# Patient Record
Sex: MALE | Race: BLACK OR AFRICAN AMERICAN | NOT HISPANIC OR LATINO | URBAN - METROPOLITAN AREA
[De-identification: names, ages, dates, MRNs, and addresses within clinical notes are randomized per-mention and may not be internally consistent; named-entity substitution may affect disease eponyms.]

---

## 2017-03-08 ENCOUNTER — OUTPATIENT (OUTPATIENT)
Dept: OUTPATIENT SERVICES | Facility: HOSPITAL | Age: 67
LOS: 1 days | End: 2017-03-08
Payer: MEDICARE

## 2017-03-08 ENCOUNTER — APPOINTMENT (OUTPATIENT)
Dept: MRI IMAGING | Facility: CLINIC | Age: 67
End: 2017-03-08

## 2017-03-08 DIAGNOSIS — Z00.8 ENCOUNTER FOR OTHER GENERAL EXAMINATION: ICD-10-CM

## 2017-03-08 PROCEDURE — 72197 MRI PELVIS W/O & W/DYE: CPT

## 2017-03-08 PROCEDURE — A9585: CPT

## 2017-03-08 PROCEDURE — 82565 ASSAY OF CREATININE: CPT

## 2018-06-01 ENCOUNTER — OUTPATIENT (OUTPATIENT)
Dept: OUTPATIENT SERVICES | Facility: HOSPITAL | Age: 68
LOS: 1 days | End: 2018-06-01

## 2018-06-10 ENCOUNTER — EMERGENCY (EMERGENCY)
Facility: HOSPITAL | Age: 68
LOS: 0 days | Discharge: HOME | End: 2018-06-10
Attending: EMERGENCY MEDICINE
Payer: MEDICARE

## 2018-06-10 VITALS
OXYGEN SATURATION: 97 % | TEMPERATURE: 98 F | HEIGHT: 66 IN | SYSTOLIC BLOOD PRESSURE: 120 MMHG | DIASTOLIC BLOOD PRESSURE: 66 MMHG | RESPIRATION RATE: 15 BRPM | WEIGHT: 179.9 LBS | HEART RATE: 83 BPM

## 2018-06-10 DIAGNOSIS — I10 ESSENTIAL (PRIMARY) HYPERTENSION: ICD-10-CM
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
## 2018-06-10 DIAGNOSIS — E78.00 PURE HYPERCHOLESTEROLEMIA, UNSPECIFIED: ICD-10-CM

## 2018-06-10 DIAGNOSIS — Z79.899 OTHER LONG TERM (CURRENT) DRUG THERAPY: ICD-10-CM

## 2018-06-10 DIAGNOSIS — M79.89 OTHER SPECIFIED SOFT TISSUE DISORDERS: ICD-10-CM

## 2018-06-10 DIAGNOSIS — S83.91XA SPRAIN OF UNSPECIFIED SITE OF RIGHT KNEE, INITIAL ENCOUNTER: ICD-10-CM

## 2018-06-10 DIAGNOSIS — M25.561 PAIN IN RIGHT KNEE: ICD-10-CM
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
## 2018-06-10 PROCEDURE — 99284 EMERGENCY DEPT VISIT MOD MDM: CPT

## 2018-06-10 PROCEDURE — 93971 EXTREMITY STUDY: CPT | Mod: 26,RT

## 2018-06-10 PROCEDURE — 73562 X-RAY EXAM OF KNEE 3: CPT | Mod: 26,RT

## 2018-06-10 RX ORDER — IBUPROFEN 200 MG
1 TABLET ORAL
Qty: 15 | Refills: 0
Start: 2018-06-10 | End: 2018-06-14

## 2018-06-10 RX ORDER — IBUPROFEN 200 MG
600 TABLET ORAL ONCE
Qty: 0 | Refills: 0 | Status: COMPLETED | OUTPATIENT
Start: 2018-06-10 | End: 2018-06-10

## 2018-06-10 RX ADMIN — Medication 600 MILLIGRAM(S): at 09:15

## 2018-06-10 RX ADMIN — Medication 600 MILLIGRAM(S): at 08:27

## 2018-06-10 NOTE — ED ADULT NURSE NOTE - OBJECTIVE STATEMENT
Reports having right knee pain starting 1 week ago, reports having swelling above knee, worsens with walking and weight bearing, lessens with rest. Deneis popping, or trauma.

## 2018-06-10 NOTE — ED PROVIDER NOTE - PHYSICAL EXAMINATION
Gen: Alert, Well appearing. NAD    Head: NC, AT, PERRL, EOMI, normal lids/conjunctiva   ENT: Bilateral TM WNL, normal hearing, patent oropharynx without erythema/exudate, uvula midline  Neck: supple, no tenderness/meningismus/JVD   Pulm: Bilateral clear BS, normal resp effort, no wheeze/stridor/retractions  CV: RRR, no M/R/G, +dist pulses   Abd: soft, NT/ND, +BS, no guarding/rebound tenderness  Mskel: + mild edema to knee, most medially, tender medial joint line. from of knee. able to fully flex and exten but pain on extension with pain to valgus stress. pulses intact. no calf tenderness  Skin: no rash   Neuro: AAOx3, no sensory/motor deficits, CN 2-12 intact
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
## 2018-06-10 NOTE — ED PROVIDER NOTE - OBJECTIVE STATEMENT
66yo male with pmh HTN, HL, presents with rt knee pain and swelling x 1 week. Long bus ride. denies any trauma, has been ambulating on it at work. no fever    ROS: No fever/chills. No photophobia/eye pain/changes in vision, No ear pain/sore throat/dysphagia, No chest pain/palpitations. No SOB/cough/stridor. No abdominal pain, N/V/D, no black/bloody bm. No dysuria/frequency/discharge, No headache. No Dizziness.  No rash.  No numbness/tingling/weakness.

## 2018-06-10 NOTE — ED PROVIDER NOTE - MEDICAL DECISION MAKING DETAILS
pt well appearing, xray neg for fx, sono neg for dvt likely sprain. fu with ortho. Discussed results and outcome of testing with the patient.  Patient given copy of available results. Patient advised to please follow up with their PMD within the next 24 hours and return to the Emergency Department for worsening symptoms or any other concerns.

## 2018-06-12 DIAGNOSIS — R69 ILLNESS, UNSPECIFIED: ICD-10-CM

## 2018-07-01 ENCOUNTER — OUTPATIENT (OUTPATIENT)
Dept: OUTPATIENT SERVICES | Facility: HOSPITAL | Age: 68
LOS: 1 days | End: 2018-07-01

## 2018-07-16 DIAGNOSIS — Z71.89 OTHER SPECIFIED COUNSELING: ICD-10-CM

## 2018-10-09 ENCOUNTER — EMERGENCY (EMERGENCY)
Facility: HOSPITAL | Age: 68
LOS: 0 days | Discharge: HOME | End: 2018-10-09
Attending: EMERGENCY MEDICINE

## 2018-10-09 VITALS
DIASTOLIC BLOOD PRESSURE: 79 MMHG | WEIGHT: 179.9 LBS | HEIGHT: 66 IN | TEMPERATURE: 98 F | OXYGEN SATURATION: 96 % | HEART RATE: 90 BPM | RESPIRATION RATE: 18 BRPM | SYSTOLIC BLOOD PRESSURE: 153 MMHG

## 2018-10-09 DIAGNOSIS — M25.551 PAIN IN RIGHT HIP: ICD-10-CM

## 2018-10-09 DIAGNOSIS — M54.31 SCIATICA, RIGHT SIDE: ICD-10-CM

## 2018-10-09 RX ORDER — ACETAMINOPHEN 500 MG
2 TABLET ORAL
Qty: 20 | Refills: 0
Start: 2018-10-09

## 2018-10-09 RX ORDER — KETOROLAC TROMETHAMINE 30 MG/ML
15 SYRINGE (ML) INJECTION ONCE
Qty: 0 | Refills: 0 | Status: DISCONTINUED | OUTPATIENT
Start: 2018-10-09 | End: 2018-10-09

## 2018-10-09 RX ORDER — CYCLOBENZAPRINE HYDROCHLORIDE 10 MG/1
1 TABLET, FILM COATED ORAL
Qty: 10 | Refills: 0
Start: 2018-10-09

## 2018-10-09 RX ADMIN — Medication 15 MILLIGRAM(S): at 12:15

## 2018-10-09 NOTE — ED ADULT TRIAGE NOTE - NS ED TRIAGE AVPU SCALE
Problem: Goal Outcome Summary  Goal: Goal Outcome Summary  Patient SBA or less with all mobility - able to ambulate 225ft in hallway and negotiate 6 steps with B rails for entry into home. Pt demonstrates and verbalizes understanding of abdominal precautions with mobility. PT goals met and no further skilled PT indicated at this time; recommend continued ambulation 3x/day with RN staff until discharge. Recommend return home with assist from spouse at d/c.      Physical Therapy Discharge Summary     Reason for therapy discharge:    All goals and outcomes met, no further needs identified.     Progress towards therapy goal(s). See goals on Care Plan in [facility] electronic health record for goal details.  Goals met     Therapy recommendation(s):    No further therapy is recommended.              Alert-The patient is alert, awake and responds to voice. The patient is oriented to time, place, and person. The triage nurse is able to obtain subjective information.

## 2018-10-09 NOTE — ED PROVIDER NOTE - OBJECTIVE STATEMENT
69 y/o male with PMH HTN, HLD, BPH, CAD s/p 2 stents here c/o R side lower back pain radiating to R buttocks and post R thigh x 1 week. pt states he has hx sciatica and pain feels same as prior episodes. he states he went to his pcp twice this week who gave him naprosyn 500mg and flexril 5mg, but pt states he is still in pain so came here for a shot which helped him last time. pt states pain is worse with movement. denies any trauma or injury. pt otherwise denies weakness, incontinence, change in urination, cp, sob, n/v, HA    ROS: No fever/chills. No eye pain/changes in vision, No ear pain/sore throat/dysphagia, No chest pain/palpitations. No SOB/cough/. No abdominal pain, N/V/D, no black/bloody bm. No dysuria/frequency/discharge, No headache. No Dizziness.    No rashes or breaks in skin. No numbness/weakness.

## 2018-10-09 NOTE — ED ADULT TRIAGE NOTE - CHIEF COMPLAINT QUOTE
c/o r lower back pain radiating down r leg x 1 week seen at pmd x 2 for same c/o dx'd with sciatica rx'd muscle relaxers and pain meds ( flexeril and naprosyn) last taken yesterday states not improving ambulatory without difficulty noted

## 2018-10-09 NOTE — ED PROVIDER NOTE - PHYSICAL EXAMINATION
Gen: Alert, NAD, well appearing  Head: NC, AT, PERRL, EOMI, normal lids/conjunctiva  ENT: B TM WNL, normal hearing, patent oropharynx without erythema/exudate, uvula midline  Neck: +supple, no tenderness/meningismus/JVD, +Trachea midline  Pulm: Bilateral BS, normal resp effort, no wheeze/stridor/retractions  CV: RRR, no M/R/G, +dist pulses  Abd: soft, NT/ND, +BS, no hepatosplenomegaly  Mskel: no edema/erythema/cyanosis, str 5/5 x4 b/l, sensations intact, no spinal tenderness CTLS, +R lumbar paraspinal tenderness, gait ok  Skin: no rash  Neuro: AAOx3, no sensory/motor deficits, CN 2-12 intact, ftn, hts intact

## 2018-10-09 NOTE — ED ADULT NURSE NOTE - NSIMPLEMENTINTERV_GEN_ALL_ED
Implemented All Universal Safety Interventions:  [place] to call system. Call bell, personal items and telephone within reach. Instruct patient to call for assistance. Room bathroom lighting operational. Non-slip footwear when patient is off stretcher. Physically safe environment: no spills, clutter or unnecessary equipment. Stretcher in lowest position, wheels locked, appropriate side rails in place.

## 2018-10-09 NOTE — ED ADULT NURSE NOTE - OBJECTIVE STATEMENT
pt received alert and oriented x4, pt c/o right buttock pain radiating down the leg for 1 week . pt has hx sciatica

## 2018-10-09 NOTE — ED PROVIDER NOTE - MEDICAL DECISION MAKING DETAILS
pt here with low back pain radiating to R buttocks, consistent with sciatica, no red flags back pain, nuero exam unremarkable, pt given low dose toradol IM, with improvement, d/w pt risk of NSAid with plavix, will send rx tylenol and increase flexril to 10 mg , provided ortho f/u as well, educated re f/u needs ok with dc pt here with low back pain radiating to R buttocks, consistent with sciatica, no red flags back pain, nuero exam unremarkable, pt is ambulatory in ed without complication, no weakness or focal deficits pt given low dose toradol IM, with improvement, d/w pt risk of NSAid with plavix, will send rx tylenol and increase flexril to 10 mg , provided ortho f/u as well, educated re f/u needs ok with dc

## 2018-10-11 ENCOUNTER — OUTPATIENT (OUTPATIENT)
Dept: OUTPATIENT SERVICES | Facility: HOSPITAL | Age: 68
LOS: 1 days | Discharge: HOME | End: 2018-10-11
Payer: MEDICARE

## 2018-10-11 ENCOUNTER — APPOINTMENT (OUTPATIENT)
Dept: CT IMAGING | Facility: HOSPITAL | Age: 68
End: 2018-10-11

## 2018-10-11 DIAGNOSIS — M54.16 RADICULOPATHY, LUMBAR REGION: ICD-10-CM

## 2018-10-11 PROCEDURE — 72131 CT LUMBAR SPINE W/O DYE: CPT | Mod: 26

## 2019-01-18 ENCOUNTER — EMERGENCY (EMERGENCY)
Facility: HOSPITAL | Age: 69
LOS: 0 days | Discharge: HOME | End: 2019-01-18
Attending: EMERGENCY MEDICINE
Payer: MEDICARE

## 2019-01-18 VITALS
WEIGHT: 179.9 LBS | RESPIRATION RATE: 17 BRPM | TEMPERATURE: 98 F | SYSTOLIC BLOOD PRESSURE: 128 MMHG | DIASTOLIC BLOOD PRESSURE: 87 MMHG | HEART RATE: 82 BPM | OXYGEN SATURATION: 97 %

## 2019-01-18 PROCEDURE — 99284 EMERGENCY DEPT VISIT MOD MDM: CPT

## 2019-01-18 RX ORDER — KETOROLAC TROMETHAMINE 30 MG/ML
30 SYRINGE (ML) INJECTION ONCE
Qty: 0 | Refills: 0 | Status: DISCONTINUED | OUTPATIENT
Start: 2019-01-18 | End: 2019-01-18

## 2019-01-18 RX ORDER — ACETAMINOPHEN WITH CODEINE 300MG-30MG
1 TABLET ORAL
Qty: 6 | Refills: 0
Start: 2019-01-18

## 2019-01-18 RX ORDER — TRAMADOL HYDROCHLORIDE 50 MG/1
1 TABLET ORAL
Qty: 6 | Refills: 0 | OUTPATIENT
Start: 2019-01-18

## 2019-01-18 RX ADMIN — Medication 30 MILLIGRAM(S): at 14:09

## 2019-01-18 RX ADMIN — Medication 30 MILLIGRAM(S): at 14:25

## 2019-01-18 NOTE — ED ADULT NURSE NOTE - NSIMPLEMENTINTERV_GEN_ALL_ED
Implemented All Universal Safety Interventions:  [place] to call system. Call bell, personal items and telephone within reach. Instruct patient to call for assistance. Room bathroom lighting operational. Non-slip footwear when patient is off stretcher. Physically safe environment: no spills, clutter or unnecessary equipment. Stretcher in lowest position, wheels locked, appropriate side rails in place.

## 2019-01-18 NOTE — ED PROVIDER NOTE - CARE PROVIDER_API CALL
Alannah Magana (DO), Orthopaedic Surgery Orthopaedics Surgery  [address] [place] [address]  Phone: [phone]  Fax: [phone]

## 2019-01-19 DIAGNOSIS — I10 ESSENTIAL (PRIMARY) HYPERTENSION: ICD-10-CM

## 2019-01-19 DIAGNOSIS — M54.9 DORSALGIA, UNSPECIFIED: ICD-10-CM

## 2019-01-19 DIAGNOSIS — M54.31 SCIATICA, RIGHT SIDE: ICD-10-CM

## 2019-01-19 DIAGNOSIS — E78.00 PURE HYPERCHOLESTEROLEMIA, UNSPECIFIED: ICD-10-CM

## 2019-12-15 ENCOUNTER — EMERGENCY (EMERGENCY)
Facility: HOSPITAL | Age: 69
LOS: 0 days | Discharge: HOME | End: 2019-12-15
Attending: EMERGENCY MEDICINE
Payer: MEDICARE

## 2019-12-15 VITALS
HEART RATE: 64 BPM | OXYGEN SATURATION: 97 % | SYSTOLIC BLOOD PRESSURE: 151 MMHG | DIASTOLIC BLOOD PRESSURE: 80 MMHG | RESPIRATION RATE: 20 BRPM

## 2019-12-15 VITALS
TEMPERATURE: 98 F | HEIGHT: 66 IN | RESPIRATION RATE: 16 BRPM | HEART RATE: 72 BPM | DIASTOLIC BLOOD PRESSURE: 77 MMHG | WEIGHT: 179.9 LBS | OXYGEN SATURATION: 97 % | SYSTOLIC BLOOD PRESSURE: 127 MMHG

## 2019-12-15 DIAGNOSIS — Z79.01 LONG TERM (CURRENT) USE OF ANTICOAGULANTS: ICD-10-CM

## 2019-12-15 DIAGNOSIS — R42 DIZZINESS AND GIDDINESS: ICD-10-CM

## 2019-12-15 DIAGNOSIS — Z95.5 PRESENCE OF CORONARY ANGIOPLASTY IMPLANT AND GRAFT: ICD-10-CM

## 2019-12-15 DIAGNOSIS — E78.00 PURE HYPERCHOLESTEROLEMIA, UNSPECIFIED: ICD-10-CM

## 2019-12-15 DIAGNOSIS — R07.9 CHEST PAIN, UNSPECIFIED: ICD-10-CM

## 2019-12-15 DIAGNOSIS — R06.02 SHORTNESS OF BREATH: ICD-10-CM

## 2019-12-15 DIAGNOSIS — I25.10 ATHEROSCLEROTIC HEART DISEASE OF NATIVE CORONARY ARTERY WITHOUT ANGINA PECTORIS: ICD-10-CM

## 2019-12-15 DIAGNOSIS — Z79.82 LONG TERM (CURRENT) USE OF ASPIRIN: ICD-10-CM

## 2019-12-15 DIAGNOSIS — I10 ESSENTIAL (PRIMARY) HYPERTENSION: ICD-10-CM

## 2019-12-15 LAB
ALBUMIN SERPL ELPH-MCNC: 3.8 G/DL — SIGNIFICANT CHANGE UP (ref 3.3–5)
ALP SERPL-CCNC: 79 U/L — SIGNIFICANT CHANGE UP (ref 40–120)
ALT FLD-CCNC: 29 U/L — SIGNIFICANT CHANGE UP (ref 12–78)
ANION GAP SERPL CALC-SCNC: 9 MMOL/L — SIGNIFICANT CHANGE UP (ref 5–17)
APTT BLD: 35.6 SEC — SIGNIFICANT CHANGE UP (ref 27.5–36.3)
AST SERPL-CCNC: 26 U/L — SIGNIFICANT CHANGE UP (ref 15–37)
BILIRUB SERPL-MCNC: 0.5 MG/DL — SIGNIFICANT CHANGE UP (ref 0.2–1.2)
BUN SERPL-MCNC: 20 MG/DL — SIGNIFICANT CHANGE UP (ref 7–23)
CALCIUM SERPL-MCNC: 9 MG/DL — SIGNIFICANT CHANGE UP (ref 8.5–10.1)
CHLORIDE SERPL-SCNC: 106 MMOL/L — SIGNIFICANT CHANGE UP (ref 96–108)
CO2 SERPL-SCNC: 27 MMOL/L — SIGNIFICANT CHANGE UP (ref 22–31)
CREAT SERPL-MCNC: 1.07 MG/DL — SIGNIFICANT CHANGE UP (ref 0.5–1.3)
D DIMER BLD IA.RAPID-MCNC: 168 NG/ML DDU — SIGNIFICANT CHANGE UP
GLUCOSE SERPL-MCNC: 102 MG/DL — HIGH (ref 70–99)
HCT VFR BLD CALC: 37.9 % — LOW (ref 39–50)
HGB BLD-MCNC: 12.7 G/DL — LOW (ref 13–17)
INR BLD: 1.02 RATIO — SIGNIFICANT CHANGE UP (ref 0.88–1.16)
MAGNESIUM SERPL-MCNC: 2.1 MG/DL — SIGNIFICANT CHANGE UP (ref 1.6–2.6)
MCHC RBC-ENTMCNC: 27.9 PG — SIGNIFICANT CHANGE UP (ref 27–34)
MCHC RBC-ENTMCNC: 33.5 GM/DL — SIGNIFICANT CHANGE UP (ref 32–36)
MCV RBC AUTO: 83.3 FL — SIGNIFICANT CHANGE UP (ref 80–100)
NRBC # BLD: 0 /100 WBCS — SIGNIFICANT CHANGE UP (ref 0–0)
NT-PROBNP SERPL-SCNC: 68 PG/ML — SIGNIFICANT CHANGE UP (ref 0–125)
PLATELET # BLD AUTO: 203 K/UL — SIGNIFICANT CHANGE UP (ref 150–400)
POTASSIUM SERPL-MCNC: 3.3 MMOL/L — LOW (ref 3.5–5.3)
POTASSIUM SERPL-SCNC: 3.3 MMOL/L — LOW (ref 3.5–5.3)
PROT SERPL-MCNC: 7.5 GM/DL — SIGNIFICANT CHANGE UP (ref 6–8.3)
PROTHROM AB SERPL-ACNC: 11.4 SEC — SIGNIFICANT CHANGE UP (ref 10–12.9)
RBC # BLD: 4.55 M/UL — SIGNIFICANT CHANGE UP (ref 4.2–5.8)
RBC # FLD: 14.6 % — HIGH (ref 10.3–14.5)
SODIUM SERPL-SCNC: 142 MMOL/L — SIGNIFICANT CHANGE UP (ref 135–145)
TROPONIN I SERPL-MCNC: <.015 NG/ML — SIGNIFICANT CHANGE UP (ref 0.01–0.04)
TROPONIN I SERPL-MCNC: <.015 NG/ML — SIGNIFICANT CHANGE UP (ref 0.01–0.04)
WBC # BLD: 5.71 K/UL — SIGNIFICANT CHANGE UP (ref 3.8–10.5)
WBC # FLD AUTO: 5.71 K/UL — SIGNIFICANT CHANGE UP (ref 3.8–10.5)

## 2019-12-15 PROCEDURE — 99285 EMERGENCY DEPT VISIT HI MDM: CPT

## 2019-12-15 PROCEDURE — 93010 ELECTROCARDIOGRAM REPORT: CPT

## 2019-12-15 PROCEDURE — 71045 X-RAY EXAM CHEST 1 VIEW: CPT | Mod: 26

## 2019-12-15 PROCEDURE — 70450 CT HEAD/BRAIN W/O DYE: CPT | Mod: 26

## 2019-12-15 PROCEDURE — 71275 CT ANGIOGRAPHY CHEST: CPT | Mod: 26

## 2019-12-15 RX ORDER — POTASSIUM CHLORIDE 20 MEQ
40 PACKET (EA) ORAL ONCE
Refills: 0 | Status: COMPLETED | OUTPATIENT
Start: 2019-12-15 | End: 2019-12-15

## 2019-12-15 RX ORDER — MECLIZINE HCL 12.5 MG
25 TABLET ORAL ONCE
Refills: 0 | Status: COMPLETED | OUTPATIENT
Start: 2019-12-15 | End: 2019-12-15

## 2019-12-15 RX ADMIN — Medication 25 MILLIGRAM(S): at 14:59

## 2019-12-15 RX ADMIN — Medication 40 MILLIEQUIVALENT(S): at 18:31

## 2019-12-15 NOTE — ED PROVIDER NOTE - PATIENT PORTAL LINK FT
You can access the FollowMyHealth Patient Portal offered by [facility] by registering at the following website: [email or web address]. By joining [employer]’s FollowMyHealth portal, you will also be able to view your health information using other applications (apps) compatible with our system.

## 2019-12-15 NOTE — ED PROVIDER NOTE - CLINICAL SUMMARY MEDICAL DECISION MAKING FREE TEXT BOX
symptoms improved, pt feeling better, asymptomatic. pt has recent stable angiogram 1 week ago. Lab values do not require emergent intervention. CT scan demonstrates no acute pathology. Fu with cardiology.

## 2019-12-15 NOTE — ED ADULT NURSE NOTE - NSIMPLEMENTINTERV_GEN_ALL_ED
Implemented All Fall with Harm Risk Interventions:  [place] to call system. Call bell, personal items and telephone within reach. Instruct patient to call for assistance. Room bathroom lighting operational. Non-slip footwear when patient is off stretcher. Physically safe environment: no spills, clutter or unnecessary equipment. Stretcher in lowest position, wheels locked, appropriate side rails in place. Provide visual cue, wrist band, yellow gown, etc. Monitor gait and stability. Monitor for mental status changes and reorient to person, place, and time. Review medications for side effects contributing to fall risk. Reinforce activity limits and safety measures with patient and family. Provide visual clues: red socks.

## 2019-12-15 NOTE — ED PROVIDER NOTE - OBJECTIVE STATEMENT
68yo male with pmh HTN, HL, CAD x 2 stents (2015, 2018), presents with cp, lightheadedness, and sob.  Pt reports 2 weeks of intermittent lightheadedness, soboe, and chest discomfort. Pt seen at [facility] last week, had angiogram that was reportedly stable and pt was dc. Pt has been feeling those symptoms since along with headache. However, since yesterday pt with chest pain, nonradiating and currently with lightheadedness. denies nv, palpitations, radiation of pain.    ROS: No fever/chills. No photophobia/eye pain/changes in vision, No ear pain/sore throat/dysphagia, +chest pain/ no palpitations.  +SOB/cough. No abdominal pain, No N/V/D, no black/bloody bm. No dysuria/frequency/discharge, +headache/Dizziness.  No rash.  No numbness/tingling/weakness.

## 2019-12-15 NOTE — ED ADULT NURSE NOTE - OBJECTIVE STATEMENT
received er bed 10 c/o chest pain since yesterday intermittently with dypsnea upon exertion over past 2 weeks seen at cardiologist 2 weeks ago [facility] with [facility] done no abnormal findings per pt lungs clear b/l respirations even and unlabored hx cad with 2 stents 2015 and 2018 c/o intermittent lightheaded sensation over past 2 weeks denies n/v c/o intermittent headaches associated with lightheaded sensation over past 2 weeks

## 2020-10-31 ENCOUNTER — EMERGENCY (EMERGENCY)
Facility: HOSPITAL | Age: 70
LOS: 0 days | Discharge: HOME | End: 2020-10-31
Payer: MEDICARE

## 2020-10-31 VITALS
HEART RATE: 91 BPM | TEMPERATURE: 98 F | RESPIRATION RATE: 17 BRPM | SYSTOLIC BLOOD PRESSURE: 151 MMHG | OXYGEN SATURATION: 97 % | WEIGHT: 179.9 LBS | HEIGHT: 66 IN | DIASTOLIC BLOOD PRESSURE: 77 MMHG

## 2020-10-31 DIAGNOSIS — Z20.818 CONTACT WITH AND (SUSPECTED) EXPOSURE TO OTHER BACTERIAL COMMUNICABLE DISEASES: ICD-10-CM

## 2020-10-31 PROBLEM — I25.10 ATHEROSCLEROTIC HEART DISEASE OF NATIVE CORONARY ARTERY WITHOUT ANGINA PECTORIS: Chronic | Status: ACTIVE | Noted: 2019-12-15

## 2020-10-31 PROBLEM — Z95.5 PRESENCE OF CORONARY ANGIOPLASTY IMPLANT AND GRAFT: Chronic | Status: ACTIVE | Noted: 2019-12-15

## 2020-10-31 PROCEDURE — 99283 EMERGENCY DEPT VISIT LOW MDM: CPT

## 2020-10-31 NOTE — ED PROVIDER NOTE - PATIENT PORTAL LINK FT
You can access the FollowMyHealth Patient Portal offered by [facility] by registering at the following website: [email or web address]. By joining [employer]’s FollowMyHealth portal, you will also be able to view your health information using other applications (apps) compatible with our system.

## 2020-10-31 NOTE — ED PROVIDER NOTE - CLINICAL SUMMARY MEDICAL DECISION MAKING FREE TEXT BOX
69 y/o M with possible contact exposure presents to ED requesting covid 19 pcr test, asymptomatic, swab sent to lab pt was dc home on quarantine we will call once result is available.

## 2020-10-31 NOTE — ED PROVIDER NOTE - CARE PLAN
Principal Discharge DX:	Contact with and (suspected) exposure to other bacterial communicable diseases

## 2020-11-01 LAB — SARS-COV-2 RNA SPEC QL NAA+PROBE: SIGNIFICANT CHANGE UP

## 2021-02-03 ENCOUNTER — EMERGENCY (EMERGENCY)
Facility: HOSPITAL | Age: 71
LOS: 0 days | Discharge: HOME | End: 2021-02-04
Attending: STUDENT IN AN ORGANIZED HEALTH CARE EDUCATION/TRAINING PROGRAM
Payer: MEDICARE

## 2021-02-03 VITALS
RESPIRATION RATE: 19 BRPM | DIASTOLIC BLOOD PRESSURE: 94 MMHG | HEIGHT: 66 IN | HEART RATE: 101 BPM | TEMPERATURE: 99 F | WEIGHT: 195.11 LBS | OXYGEN SATURATION: 95 % | SYSTOLIC BLOOD PRESSURE: 153 MMHG

## 2021-02-03 PROCEDURE — 99284 EMERGENCY DEPT VISIT MOD MDM: CPT

## 2021-02-03 NOTE — ED ADULT TRIAGE NOTE - CHIEF COMPLAINT QUOTE
Pt c/o blood in urine with clots , urinary retention x 3 days ago. H/O HTN, HLD, BPH Pt c/o blood in urine with clots , urinary retention, painful urination x 3 days ago. H/O HTN, HLD, BPH

## 2021-02-04 DIAGNOSIS — R31.9 HEMATURIA, UNSPECIFIED: ICD-10-CM

## 2021-02-04 DIAGNOSIS — E78.00 PURE HYPERCHOLESTEROLEMIA, UNSPECIFIED: ICD-10-CM

## 2021-02-04 DIAGNOSIS — I25.10 ATHEROSCLEROTIC HEART DISEASE OF NATIVE CORONARY ARTERY WITHOUT ANGINA PECTORIS: ICD-10-CM

## 2021-02-04 DIAGNOSIS — I10 ESSENTIAL (PRIMARY) HYPERTENSION: ICD-10-CM

## 2021-02-04 LAB
ALBUMIN SERPL ELPH-MCNC: 3.8 G/DL — SIGNIFICANT CHANGE UP (ref 3.3–5)
ALP SERPL-CCNC: 90 U/L — SIGNIFICANT CHANGE UP (ref 40–120)
ALT FLD-CCNC: 39 U/L — SIGNIFICANT CHANGE UP (ref 12–78)
ANION GAP SERPL CALC-SCNC: 5 MMOL/L — SIGNIFICANT CHANGE UP (ref 5–17)
APPEARANCE UR: ABNORMAL
APTT BLD: 37.6 SEC — HIGH (ref 27.5–35.5)
AST SERPL-CCNC: 31 U/L — SIGNIFICANT CHANGE UP (ref 15–37)
BACTERIA # UR AUTO: ABNORMAL
BASOPHILS # BLD AUTO: 0.02 K/UL — SIGNIFICANT CHANGE UP (ref 0–0.2)
BASOPHILS NFR BLD AUTO: 0.3 % — SIGNIFICANT CHANGE UP (ref 0–2)
BILIRUB SERPL-MCNC: 0.6 MG/DL — SIGNIFICANT CHANGE UP (ref 0.2–1.2)
BILIRUB UR-MCNC: NEGATIVE — SIGNIFICANT CHANGE UP
BUN SERPL-MCNC: 23 MG/DL — SIGNIFICANT CHANGE UP (ref 7–23)
CALCIUM SERPL-MCNC: 8.3 MG/DL — LOW (ref 8.5–10.1)
CHLORIDE SERPL-SCNC: 108 MMOL/L — SIGNIFICANT CHANGE UP (ref 96–108)
CO2 SERPL-SCNC: 29 MMOL/L — SIGNIFICANT CHANGE UP (ref 22–31)
COLOR SPEC: ABNORMAL
CREAT SERPL-MCNC: 1.36 MG/DL — HIGH (ref 0.5–1.3)
DIFF PNL FLD: ABNORMAL
EOSINOPHIL # BLD AUTO: 0.27 K/UL — SIGNIFICANT CHANGE UP (ref 0–0.5)
EOSINOPHIL NFR BLD AUTO: 3.9 % — SIGNIFICANT CHANGE UP (ref 0–6)
GLUCOSE SERPL-MCNC: 128 MG/DL — HIGH (ref 70–99)
GLUCOSE UR QL: NEGATIVE MG/DL — SIGNIFICANT CHANGE UP
HCT VFR BLD CALC: 38.8 % — LOW (ref 39–50)
HGB BLD-MCNC: 13.5 G/DL — SIGNIFICANT CHANGE UP (ref 13–17)
IMM GRANULOCYTES NFR BLD AUTO: 0.1 % — SIGNIFICANT CHANGE UP (ref 0–1.5)
INR BLD: 1.14 RATIO — SIGNIFICANT CHANGE UP (ref 0.88–1.16)
KETONES UR-MCNC: ABNORMAL
LEUKOCYTE ESTERASE UR-ACNC: ABNORMAL
LYMPHOCYTES # BLD AUTO: 2.21 K/UL — SIGNIFICANT CHANGE UP (ref 1–3.3)
LYMPHOCYTES # BLD AUTO: 32.3 % — SIGNIFICANT CHANGE UP (ref 13–44)
MCHC RBC-ENTMCNC: 28.8 PG — SIGNIFICANT CHANGE UP (ref 27–34)
MCHC RBC-ENTMCNC: 34.8 GM/DL — SIGNIFICANT CHANGE UP (ref 32–36)
MCV RBC AUTO: 82.9 FL — SIGNIFICANT CHANGE UP (ref 80–100)
MONOCYTES # BLD AUTO: 0.5 K/UL — SIGNIFICANT CHANGE UP (ref 0–0.9)
MONOCYTES NFR BLD AUTO: 7.3 % — SIGNIFICANT CHANGE UP (ref 2–14)
NEUTROPHILS # BLD AUTO: 3.84 K/UL — SIGNIFICANT CHANGE UP (ref 1.8–7.4)
NEUTROPHILS NFR BLD AUTO: 56.1 % — SIGNIFICANT CHANGE UP (ref 43–77)
NITRITE UR-MCNC: NEGATIVE — SIGNIFICANT CHANGE UP
NRBC # BLD: 0 /100 WBCS — SIGNIFICANT CHANGE UP (ref 0–0)
PH UR: 5 — SIGNIFICANT CHANGE UP (ref 5–8)
PLATELET # BLD AUTO: 188 K/UL — SIGNIFICANT CHANGE UP (ref 150–400)
POTASSIUM SERPL-MCNC: 3.4 MMOL/L — LOW (ref 3.5–5.3)
POTASSIUM SERPL-SCNC: 3.4 MMOL/L — LOW (ref 3.5–5.3)
PROT SERPL-MCNC: 7.4 GM/DL — SIGNIFICANT CHANGE UP (ref 6–8.3)
PROT UR-MCNC: 100 MG/DL
PROTHROM AB SERPL-ACNC: 13.1 SEC — SIGNIFICANT CHANGE UP (ref 10.6–13.6)
RBC # BLD: 4.68 M/UL — SIGNIFICANT CHANGE UP (ref 4.2–5.8)
RBC # FLD: 14.7 % — HIGH (ref 10.3–14.5)
RBC CASTS # UR COMP ASSIST: >50 /HPF (ref 0–4)
SODIUM SERPL-SCNC: 142 MMOL/L — SIGNIFICANT CHANGE UP (ref 135–145)
SP GR SPEC: 1.01 — SIGNIFICANT CHANGE UP (ref 1.01–1.02)
UROBILINOGEN FLD QL: NEGATIVE MG/DL — SIGNIFICANT CHANGE UP
WBC # BLD: 6.85 K/UL — SIGNIFICANT CHANGE UP (ref 3.8–10.5)
WBC # FLD AUTO: 6.85 K/UL — SIGNIFICANT CHANGE UP (ref 3.8–10.5)
WBC UR QL: SIGNIFICANT CHANGE UP

## 2021-02-04 RX ORDER — SODIUM CHLORIDE 9 MG/ML
1000 INJECTION INTRAMUSCULAR; INTRAVENOUS; SUBCUTANEOUS ONCE
Refills: 0 | Status: COMPLETED | OUTPATIENT
Start: 2021-02-04 | End: 2021-02-04

## 2021-02-04 RX ORDER — CEFPODOXIME PROXETIL 100 MG
1 TABLET ORAL
Qty: 20 | Refills: 0
Start: 2021-02-04 | End: 2021-02-13

## 2021-02-04 RX ORDER — CEFTRIAXONE 500 MG/1
1000 INJECTION, POWDER, FOR SOLUTION INTRAMUSCULAR; INTRAVENOUS ONCE
Refills: 0 | Status: COMPLETED | OUTPATIENT
Start: 2021-02-04 | End: 2021-02-04

## 2021-02-04 RX ADMIN — SODIUM CHLORIDE 1000 MILLILITER(S): 9 INJECTION INTRAMUSCULAR; INTRAVENOUS; SUBCUTANEOUS at 01:26

## 2021-02-04 RX ADMIN — CEFTRIAXONE 100 MILLIGRAM(S): 500 INJECTION, POWDER, FOR SOLUTION INTRAMUSCULAR; INTRAVENOUS at 01:26

## 2021-02-04 NOTE — ED PROVIDER NOTE - CARE PROVIDER_API CALL
CONSTANTINE STAFFORD  Urology  [address]  [place] [address]  Phone: [phone]  Fax: [phone]  Follow Up Time:

## 2021-02-04 NOTE — ED ADULT NURSE NOTE - NS ED NURSE LEVEL OF CONSCIOUSNESS ORIENTATION
41M hx EtOH cirrhosis c/b varices, recurrent E. coli bacteremia, w/ last episode in 6/2017 currently on IV ceftriaxone p/w severe abd pain x 1 day, meeting SIRS criteria on admission (fever, tachycardia, tachypnea), w/ CT A/P showing cirrhosis, portal HTN, varices, unchanged from prior studies, perihepatic ascites & persistent colonic wall thickening suggestive of portal colopathy    1. Decompensated cirrhosis: MELD 17. Currently stable.   2. Persistent fevers: due to PICC line infection vs. ? related to portal enterocolopathy?. S/p d/c of PICC line and now afebrile on antibiotics    Recommendations  -continue with lasix 20 aldactone 50 and nadolol  -will continue to weigh benefit of TIPS 41M hx EtOH cirrhosis c/b varices, recurrent E. coli bacteremia, w/ last episode in 6/2017 currently on IV ceftriaxone p/w severe abd pain x 1 day, meeting SIRS criteria on admission (fever, tachycardia, tachypnea), w/ CT A/P showing cirrhosis, portal HTN, varices, unchanged from prior studies, perihepatic ascites & persistent colonic wall thickening suggestive of portal colopathy    1. Decompensated cirrhosis: MELD 17. Currently stable.   2. Persistent fevers: due to PICC line infection vs. ? related to portal enterocolopathy?. S/p d/c of PICC line and now afebrile on antibiotics    Recommendations  -continue with lasix 20 aldactone 50 and nadolol  -antibiotics per primary team. Will coordinate short interval follow up with Dr. Thakkar. 41M hx EtOH cirrhosis c/b varices, recurrent E. coli bacteremia, w/ last episode in 6/2017 currently on IV ceftriaxone p/w severe abd pain x 1 day, meeting SIRS criteria on admission (fever, tachycardia, tachypnea), w/ CT A/P showing cirrhosis, portal HTN, varices, unchanged from prior studies, perihepatic ascites & persistent colonic wall thickening suggestive of portal colopathy    1. Decompensated cirrhosis: MELD 17. Currently stable.   2. Persistent fevers: due to PICC line infection vs. ? related to portal enterocolopathy?. S/p d/c of PICC line and now afebrile on antibiotics    Recommendations  -continue with lasix 20 aldactone 50 and nadolol  -antibiotics per primary team.   -follow up with Dr. Thakkar on 7/18/2017 at 130pm. Oriented - self; Oriented - place; Oriented - time

## 2021-02-04 NOTE — ED PROVIDER NOTE - OBJECTIVE STATEMENT
70M PMHx of CAD x stent x 2, HTN, HLD, on 81mg ASA presenting with painless hematuria x 3 days. Described as passing blood clots and difficulty urinating, urinary frequency, urinary retention. Today, able to pass urine without difficulty, but gross red blood that has been getting lighter over past 3 days. Denies any anticoagulation, chest pain, shortness of breath, abdominal pain, fevers, chills, trauma, penile discharge, urethral pain, dysuria, prior hx of hematuria.

## 2021-02-04 NOTE — ED PROVIDER NOTE - PATIENT PORTAL LINK FT
You can access the FollowMyHealth Patient Portal offered by [facility] by registering at the following website: [email or web address]. By joining [employer]’s FollowMyHealth portal, you will also be able to view your health information using other applications (apps) compatible with our system.

## 2021-02-04 NOTE — ED ADULT NURSE NOTE - OBJECTIVE STATEMENT
70M aaox4 ambulatory with h/o htn, hld and bph p/w c/o blood in urine for few days nonw accompanied by clots. Patient saw his MD yesterday thru telemedicine and was prescribed with Cipro 500mg PO, took 1 dose today but came here to be evaluated. patient able to urinate freely, noted gross hematuira in the urine cup. UA specimen sent. Pt denies any chills or fever, no nausea, vomiting or abd pain.

## 2021-02-04 NOTE — ED PROVIDER NOTE - PROGRESS NOTE DETAILS
hb 13, Cr 1.36 likely secondary to prior obstruction over past 3 days. (+) UTI. on 81mg ASA. discussed with patient about f/u with urology for rule out bladder cancer. verbalizes understanding and return precautions. I have discussed with the patient about the ED workup, lab results, diagnostics results, plan for discharge home, need for follow-up with primary care physician/specialists, and return precautions. At this time, the patient does not require further workup in the ED. The patient is subjectively feeling better and would like to be discharged home. The patient had the opportunity to ask questions and I have answered all inquiries. The patient verbalizes understanding and agreement with the plan. The patient is hemodynamically stable, clinically well-appearing, ambulatory, mentating well and ready for discharge home.

## 2021-02-04 NOTE — ED ADULT NURSE REASSESSMENT NOTE - NS ED NURSE REASSESS COMMENT FT1
Patient reports improvement, urine becoming lighter noted from the urinal. Denies any urinary retention. VS wdl. Pending MD re-assessment and dc.

## 2021-02-04 NOTE — ED PROVIDER NOTE - NSFOLLOWUPCLINICS_GEN_ALL_ED_FT
[facility] - Urology Clinic  Urology  [address]rd [place] [address]  Phone: [phone]  Fax:   Follow Up Time:     [facility] - Urology  Urology  [address], 3rd & 4th [place] [address]  Phone: [phone]  Fax:   Follow Up Time:     [facility]  Urology  [address] [place] [address]  Phone: [phone]  Fax: [phone]  Follow Up Time:

## 2021-02-04 NOTE — ED PROVIDER NOTE - NSFOLLOWUPINSTRUCTIONS_ED_ALL_ED_FT
Urinary Tract Infection    A urinary tract infection (UTI) is an infection of any part of the urinary tract, which includes the kidneys, ureters, bladder, and urethra. Risk factors include ignoring your need to urinate, wiping back to front if female, being an uncircumcised male, and having diabetes or a weak immune system. Symptoms include frequent urination, pain or burning with urination, foul smelling urine, cloudy urine, pain in the lower abdomen, blood in the urine, and fever. If you were prescribed an antibiotic medicine, take it as told by your health care provider. Do not stop taking the antibiotic even if you start to feel better.    SEEK IMMEDIATE MEDICAL CARE IF YOU HAVE ANY OF THE FOLLOWING SYMPTOMS: severe back or abdominal pain, fever, inability to keep fluids or medicine down, dizziness/lightheadedness, or a change in mental status.     Rest, drink plenty of fluids.  Advance activity as tolerated.  Continue all previously prescribed medications as directed.  Follow up with your PMD 2-3 days and bring copies of your results.  Follow up with urology in 2-5 days for further evaluation of your bloody urine.  Return to the ER for worsening symptoms, unable to urinate, abdominal pain, fevers, vomiting, or new concerning symptoms.

## 2021-02-04 NOTE — ED PROVIDER NOTE - CLINICAL SUMMARY MEDICAL DECISION MAKING FREE TEXT BOX
painless hematuria, now with good flowing urine in the ED, no retention, patient prefers to not have hussein catheter placed and wants to follow up with urology. dc home w/ cefpodoxime. return precautions given.

## 2021-02-04 NOTE — ED ADULT NURSE NOTE - NSIMPLEMENTINTERV_GEN_ALL_ED
Implemented All Universal Safety Interventions:  [place] to call system. Call bell, personal items and telephone within reach. Instruct patient to call for assistance. Room bathroom lighting operational. Non-slip footwear when patient is off stretcher. Physically safe environment: no spills, clutter or unnecessary equipment. Stretcher in lowest position, wheels locked, appropriate side rails in place.

## 2021-02-04 NOTE — ED ADULT NURSE NOTE - CHIEF COMPLAINT QUOTE
Pt c/o blood in urine with clots , urinary retention, painful urination x 3 days ago. H/O HTN, HLD, BPH

## 2021-02-04 NOTE — ED ADULT NURSE NOTE - PMH
CAD (coronary artery disease)    Hypercholesterolemia    Hypertension    Stented coronary artery

## 2021-02-05 LAB
CULTURE RESULTS: NO GROWTH — SIGNIFICANT CHANGE UP
SPECIMEN SOURCE: SIGNIFICANT CHANGE UP

## 2021-02-22 ENCOUNTER — APPOINTMENT (OUTPATIENT)
Dept: CT IMAGING | Facility: HOSPITAL | Age: 71
End: 2021-02-22

## 2021-03-24 ENCOUNTER — OUTPATIENT (OUTPATIENT)
Dept: OUTPATIENT SERVICES | Facility: HOSPITAL | Age: 71
LOS: 1 days | Discharge: HOME | End: 2021-03-24
Payer: MEDICARE

## 2021-03-24 ENCOUNTER — APPOINTMENT (OUTPATIENT)
Dept: CT IMAGING | Facility: HOSPITAL | Age: 71
End: 2021-03-24

## 2021-03-24 DIAGNOSIS — R31.0 GROSS HEMATURIA: ICD-10-CM

## 2021-03-24 PROCEDURE — 74178 CT ABD&PLV WO CNTR FLWD CNTR: CPT | Mod: 26
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
## 2021-10-24 NOTE — ED PROVIDER NOTE - CARE PROVIDER_API CALL
<-- Click to add NO pertinent Family History Miguel Ángel Cardenas)  Cardiology; Interventional Cardiology  [address] [place], [place] [address]  Phone: [phone]  Fax: [phone]  Follow Up Time:

## 2022-01-04 ENCOUNTER — EMERGENCY (EMERGENCY)
Facility: HOSPITAL | Age: 72
LOS: 0 days | Discharge: HOME | End: 2022-01-04
Attending: STUDENT IN AN ORGANIZED HEALTH CARE EDUCATION/TRAINING PROGRAM
Payer: MEDICARE

## 2022-01-04 VITALS
TEMPERATURE: 98 F | HEART RATE: 85 BPM | DIASTOLIC BLOOD PRESSURE: 88 MMHG | SYSTOLIC BLOOD PRESSURE: 149 MMHG | OXYGEN SATURATION: 98 % | RESPIRATION RATE: 18 BRPM

## 2022-01-04 VITALS
SYSTOLIC BLOOD PRESSURE: 143 MMHG | TEMPERATURE: 99 F | OXYGEN SATURATION: 98 % | HEIGHT: 66 IN | DIASTOLIC BLOOD PRESSURE: 80 MMHG | RESPIRATION RATE: 17 BRPM | WEIGHT: 179.9 LBS | HEART RATE: 83 BPM

## 2022-01-04 DIAGNOSIS — Z79.82 LONG TERM (CURRENT) USE OF ASPIRIN: ICD-10-CM

## 2022-01-04 DIAGNOSIS — R41.0 DISORIENTATION, UNSPECIFIED: ICD-10-CM

## 2022-01-04 DIAGNOSIS — I63.9 CEREBRAL INFARCTION, UNSPECIFIED: ICD-10-CM

## 2022-01-04 DIAGNOSIS — I10 ESSENTIAL (PRIMARY) HYPERTENSION: ICD-10-CM

## 2022-01-04 DIAGNOSIS — I65.21 OCCLUSION AND STENOSIS OF RIGHT CAROTID ARTERY: ICD-10-CM

## 2022-01-04 DIAGNOSIS — Z20.822 CONTACT WITH AND (SUSPECTED) EXPOSURE TO COVID-19: ICD-10-CM

## 2022-01-04 DIAGNOSIS — R47.81 SLURRED SPEECH: ICD-10-CM

## 2022-01-04 LAB
ALBUMIN SERPL ELPH-MCNC: 3.6 G/DL — SIGNIFICANT CHANGE UP (ref 3.3–5)
ALP SERPL-CCNC: 69 U/L — SIGNIFICANT CHANGE UP (ref 40–120)
ALT FLD-CCNC: 19 U/L — SIGNIFICANT CHANGE UP (ref 12–78)
ANION GAP SERPL CALC-SCNC: 4 MMOL/L — LOW (ref 5–17)
APTT BLD: 41.2 SEC — HIGH (ref 27.5–35.5)
AST SERPL-CCNC: 22 U/L — SIGNIFICANT CHANGE UP (ref 15–37)
BASOPHILS # BLD AUTO: 0.02 K/UL — SIGNIFICANT CHANGE UP (ref 0–0.2)
BASOPHILS NFR BLD AUTO: 0.3 % — SIGNIFICANT CHANGE UP (ref 0–2)
BILIRUB SERPL-MCNC: 0.6 MG/DL — SIGNIFICANT CHANGE UP (ref 0.2–1.2)
BUN SERPL-MCNC: 17 MG/DL — SIGNIFICANT CHANGE UP (ref 7–23)
CALCIUM SERPL-MCNC: 9.2 MG/DL — SIGNIFICANT CHANGE UP (ref 8.5–10.1)
CHLORIDE SERPL-SCNC: 106 MMOL/L — SIGNIFICANT CHANGE UP (ref 96–108)
CO2 SERPL-SCNC: 30 MMOL/L — SIGNIFICANT CHANGE UP (ref 22–31)
CREAT SERPL-MCNC: 1.13 MG/DL — SIGNIFICANT CHANGE UP (ref 0.5–1.3)
EOSINOPHIL # BLD AUTO: 0.15 K/UL — SIGNIFICANT CHANGE UP (ref 0–0.5)
EOSINOPHIL NFR BLD AUTO: 2.4 % — SIGNIFICANT CHANGE UP (ref 0–6)
FLUAV AG NPH QL: SIGNIFICANT CHANGE UP
FLUBV AG NPH QL: SIGNIFICANT CHANGE UP
GLUCOSE SERPL-MCNC: 88 MG/DL — SIGNIFICANT CHANGE UP (ref 70–99)
HCT VFR BLD CALC: 40.6 % — SIGNIFICANT CHANGE UP (ref 39–50)
HGB BLD-MCNC: 13.6 G/DL — SIGNIFICANT CHANGE UP (ref 13–17)
IMM GRANULOCYTES NFR BLD AUTO: 0.3 % — SIGNIFICANT CHANGE UP (ref 0–1.5)
INR BLD: 1.17 RATIO — HIGH (ref 0.88–1.16)
LYMPHOCYTES # BLD AUTO: 2.15 K/UL — SIGNIFICANT CHANGE UP (ref 1–3.3)
LYMPHOCYTES # BLD AUTO: 34 % — SIGNIFICANT CHANGE UP (ref 13–44)
MCHC RBC-ENTMCNC: 28 PG — SIGNIFICANT CHANGE UP (ref 27–34)
MCHC RBC-ENTMCNC: 33.5 GM/DL — SIGNIFICANT CHANGE UP (ref 32–36)
MCV RBC AUTO: 83.5 FL — SIGNIFICANT CHANGE UP (ref 80–100)
MONOCYTES # BLD AUTO: 0.5 K/UL — SIGNIFICANT CHANGE UP (ref 0–0.9)
MONOCYTES NFR BLD AUTO: 7.9 % — SIGNIFICANT CHANGE UP (ref 2–14)
NEUTROPHILS # BLD AUTO: 3.48 K/UL — SIGNIFICANT CHANGE UP (ref 1.8–7.4)
NEUTROPHILS NFR BLD AUTO: 55.1 % — SIGNIFICANT CHANGE UP (ref 43–77)
NRBC # BLD: 0 /100 WBCS — SIGNIFICANT CHANGE UP (ref 0–0)
PLATELET # BLD AUTO: 191 K/UL — SIGNIFICANT CHANGE UP (ref 150–400)
POTASSIUM SERPL-MCNC: 3.4 MMOL/L — LOW (ref 3.5–5.3)
POTASSIUM SERPL-SCNC: 3.4 MMOL/L — LOW (ref 3.5–5.3)
PROT SERPL-MCNC: 7.5 GM/DL — SIGNIFICANT CHANGE UP (ref 6–8.3)
PROTHROM AB SERPL-ACNC: 13.5 SEC — SIGNIFICANT CHANGE UP (ref 10.6–13.6)
RBC # BLD: 4.86 M/UL — SIGNIFICANT CHANGE UP (ref 4.2–5.8)
RBC # FLD: 14.8 % — HIGH (ref 10.3–14.5)
SARS-COV-2 RNA SPEC QL NAA+PROBE: SIGNIFICANT CHANGE UP
SODIUM SERPL-SCNC: 140 MMOL/L — SIGNIFICANT CHANGE UP (ref 135–145)
TROPONIN I, HIGH SENSITIVITY RESULT: 12.7 NG/L — SIGNIFICANT CHANGE UP
WBC # BLD: 6.32 K/UL — SIGNIFICANT CHANGE UP (ref 3.8–10.5)
WBC # FLD AUTO: 6.32 K/UL — SIGNIFICANT CHANGE UP (ref 3.8–10.5)

## 2022-01-04 PROCEDURE — 70496 CT ANGIOGRAPHY HEAD: CPT | Mod: 26,MA

## 2022-01-04 PROCEDURE — 71045 X-RAY EXAM CHEST 1 VIEW: CPT | Mod: 26

## 2022-01-04 PROCEDURE — 93010 ELECTROCARDIOGRAM REPORT: CPT

## 2022-01-04 PROCEDURE — 70498 CT ANGIOGRAPHY NECK: CPT | Mod: 26,MA

## 2022-01-04 PROCEDURE — 99285 EMERGENCY DEPT VISIT HI MDM: CPT

## 2022-01-04 NOTE — ED ADULT TRIAGE NOTE - HEART RATE (BEATS/MIN)
Impression: Type 2 diabetes mellitus w/o complication: G83.9. Plan: No signs of retinopathy or neovascularization noted. Discussed ocular and systemic benefits of blood sugar control.  RTC 1yr complete exam
83

## 2022-01-04 NOTE — ED PROVIDER NOTE - CARE PROVIDER_API CALL
Travon Perez)  Neurology  [address], [address]  [place] [address]  Phone: [phone]  Fax: [phone]  Follow Up Time:     Stephen Cedeno)  Surgery  [address]nd [address]  [place], [place] [id number]  Phone: [phone]  Fax: [phone]  Follow Up Time:

## 2022-01-04 NOTE — ED PROVIDER NOTE - CLINICAL SUMMARY MEDICAL DECISION MAKING FREE TEXT BOX
neurologically intact in ED. possible TIA vs. alcohol intoxication mimicing stroke symptoms. will obtain ct/cta for evaluation. will test for covid. lateral TWI present on prior ekgs. possible dc with neurology follow up

## 2022-01-04 NOTE — ED PROVIDER NOTE - NSFOLLOWUPINSTRUCTIONS_ED_ALL_ED_FT
Rest, drink plenty of fluids.  Advance activity as tolerated.  Continue all previously prescribed medications as directed.  Follow up with your PMD in 1 day and bring copies of your results.  Return to the ER for worsening symptoms, fevers, weakness, numbness/tingling, passing out, headaches, or new concerning symptoms.    Please follow up with vascular surgery as soon as possible to evaluate your carotid stenosis.     Please follow up with neurology as soon as possible to evaluate for your prior strokes.

## 2022-01-04 NOTE — ED PROVIDER NOTE - PROGRESS NOTE DETAILS
SUZETTE DAMICO: Radiology callback, (+) > 70% right ICA stenosis and Moderate to severe stenosis suspected in the P2 segment of the right PCA. I discussed with the patient about the results. He verbalized understanding and wanted me to call his sister merlin [phone] and explain results.   The patient has requested to leave the ED against medical advice. The patient's reasoning for leaving include, but are not limited to the following: "I want to go home to shut off my electric heater. I will come back tomorrow"    I believe this patient is of sound mind and competent to refuse medical care. The pt is clinically sober and does not appear to be under the influence of any illicit substances at this time, is A&O to person, place, and time, is responding/asking questions appropriately, is not psychotic, delusional, suicidal/homicidal, or hallucinating. Throughout our interactions in the ED today, the pt has demonstrated concrete thinking/reasoning, has maintained an orderly/reasonably conversation, appears to have insight/judgement/reason, and therefore in or opinion has capacity to make decisions.     Given the patient's presentation, we communicated our concern for CVA, carotid stenosis, stroke, TIA, in laymans terms. The patient verbalized an understanding of our worries and concerns. We have told the patient that the ED evaluation is incomplete and many troublesome conditions haven't been ruled out. We have discussed the need for further ED workup so we can get more information about his likely stroke. We have discussed the range of possible diagnoses, potential testing, and treatment options. We have made numerous efforts to prevent the patient from leaving AMA.     Our discussions included the potential outcomes of leaving AMA including but not limited to: CVA, stroke, weakness, worsening of their conditions, becoming permently disabled, in pain, critically ill, coma, loss of current lifestyle, delayed diagnosis, or death.     Despite these efforts, we were unable to convince the patient to stay. The patient is refusing any further care and is leaving AMA. We have attempted to offer treatment, prescriptions, and guidance for any dangerous conditions which are mostly likely and/or dangerous. We have answered all questions and have implored the patient to return ASA to complete the workup. A staff member witnessed the patient consenting to AMA.

## 2022-01-04 NOTE — ED ADULT NURSE NOTE - CHIEF COMPLAINT QUOTE
as per patient, states had a stroke on carlos day, and wishes to be evaluated for any "damage". pt denies any weakness, endorses numbness in the left arm since carlos day. and states has been having palpitations and SOB since. reports blurred vision for past 3-4 days. Pt also endorses wanting a covid test.

## 2022-01-04 NOTE — ED ADULT TRIAGE NOTE - CODE STROKE ACTIVE YN
Principal Discharge DX:	Pyelonephritis  Goal:	healthy baby  Assessment and plan of treatment:	Please take antibiotics as directed.  Return to Emergency Department or follow up with pediatrician if high fevers return, vomiting, patient is unable to tolerate liquids by mouth, decreased wet diapers, or lethargy. No

## 2022-01-04 NOTE — ED ADULT NURSE NOTE - OBJECTIVE STATEMENT
70 YO M Here for evaluation of recent "stroke" per hx he had stroke symptoms on 12/25/21, "couldn't stand up" slurred speech, blurry vision.  his friends thought he was drunk.  all symptoms resolved quickly pt thinks  in 30 mins.  he is now A&OX3, no residual symptoms aside from blurry vision.  placed piv sent labs and covid swab and will obtain CT.   hx of cardiac stents on plavix/aspirin, hx of htn.

## 2022-01-04 NOTE — ED PROVIDER NOTE - OBJECTIVE STATEMENT
71m pmhx htn, cad presenting for covid test. no symptoms, states he requires it for work clearance. additionally he states that on 12/25 he had 1 cup of wine and then developed slurred speech and confusion. does not think he drank enough etoh to be the cause. no symptoms of the above since.

## 2022-01-04 NOTE — ED PROVIDER NOTE - CARE PLAN
1 Principal Discharge DX:	Encounter for screening laboratory testing for COVID-19 virus   Principal Discharge DX:	Encounter for screening laboratory testing for COVID-19 virus  Secondary Diagnosis:	Carotid stenosis  Secondary Diagnosis:	Cerebrovascular accident (CVA)

## 2022-01-04 NOTE — ED PROVIDER NOTE - NSFOLLOWUPCLINICS_GEN_ALL_ED_FT
[facility]  [address] [place] [address]  Phone: [phone]  Fax: [phone]

## 2022-01-04 NOTE — ED PROVIDER NOTE - PHYSICAL EXAMINATION
General: Awake, alert and oriented. No acute distress. Well developed, hydrated and nourished. Appears stated age.   Skin: Skin in warm, dry and intact without rashes or lesions. Appropriate color for ethnicity  HENMT: head normocephalic and atraumatic; bilateral external ears without swelling. no nasal discharge. moist oral mucosa. supple neck, trachea midline  EYES: Conjunctiva clear. nonicteric sclera. EOM intact, Eyelids are normal in appearance without swelling or lesions.  Cardiac: well perfused  Respiratory: breathing comfortably on room air. no audible wheezing or stridor  Abdominal: nondistended  MSK: Neck and back are without deformity, visible external skin changes, or signs of trauma. Curvature of the cervical, thoracic, and lumbar spine are within normal limits. no external signs of trauma. no apparent deficits in ROM of any extremity  Neurological: see stroke section  Psychiatric: Appropriate mood and affect. Good judgement and insight. No visual or auditory hallucinations.

## 2022-01-04 NOTE — ED ADULT NURSE NOTE - EXPLANATION OF PATIENT'S REASON FOR LEAVING
needs to turn off heater at home, and get phone , planning on coming back tomorrow. Discharge teaching provided. Pt verbalized understanding of discharge teaching.

## 2022-01-04 NOTE — ED ADULT TRIAGE NOTE - CHIEF COMPLAINT QUOTE
as per patient, states had a stroke on carlos day, and wishes to be evaluated for any "damage". pt denies any weakness, endorses numbness in the left arm since carlos day. however states has been having palpitations and SOB since. reports blurred vision for past 3-4 days. Pt also endorses wanting a covid test. as per patient, states had a stroke on carlos day, and wishes to be evaluated for any "damage". pt denies any weakness, endorses numbness in the left arm since carlos day. and states has been having palpitations and SOB since. reports blurred vision for past 3-4 days. Pt also endorses wanting a covid test.

## 2022-01-04 NOTE — ED PROVIDER NOTE - PATIENT PORTAL LINK FT
You can access the FollowMyHealth Patient Portal offered by [facility] by registering at the following website: [email or web address]. By joining [facility]’s FollowMyHealth portal, you will also be able to view your health information using other applications (apps) compatible with our system.

## 2022-01-05 ENCOUNTER — INPATIENT (INPATIENT)
Facility: HOSPITAL | Age: 72
LOS: 2 days | Discharge: HOME | End: 2022-01-08
Attending: INTERNAL MEDICINE | Admitting: INTERNAL MEDICINE
Payer: MEDICARE

## 2022-01-05 VITALS
HEART RATE: 81 BPM | HEIGHT: 66 IN | SYSTOLIC BLOOD PRESSURE: 164 MMHG | OXYGEN SATURATION: 99 % | RESPIRATION RATE: 18 BRPM | WEIGHT: 179.9 LBS | TEMPERATURE: 98 F | DIASTOLIC BLOOD PRESSURE: 81 MMHG

## 2022-01-05 LAB
ALBUMIN SERPL ELPH-MCNC: 3.4 G/DL — SIGNIFICANT CHANGE UP (ref 3.3–5)
ALP SERPL-CCNC: 73 U/L — SIGNIFICANT CHANGE UP (ref 40–120)
ALT FLD-CCNC: 21 U/L — SIGNIFICANT CHANGE UP (ref 12–78)
ANION GAP SERPL CALC-SCNC: 7 MMOL/L — SIGNIFICANT CHANGE UP (ref 5–17)
AST SERPL-CCNC: 22 U/L — SIGNIFICANT CHANGE UP (ref 15–37)
BASOPHILS # BLD AUTO: 0.02 K/UL — SIGNIFICANT CHANGE UP (ref 0–0.2)
BASOPHILS NFR BLD AUTO: 0.4 % — SIGNIFICANT CHANGE UP (ref 0–2)
BILIRUB SERPL-MCNC: 0.6 MG/DL — SIGNIFICANT CHANGE UP (ref 0.2–1.2)
BUN SERPL-MCNC: 19 MG/DL — SIGNIFICANT CHANGE UP (ref 7–23)
CALCIUM SERPL-MCNC: 8.8 MG/DL — SIGNIFICANT CHANGE UP (ref 8.5–10.1)
CHLORIDE SERPL-SCNC: 106 MMOL/L — SIGNIFICANT CHANGE UP (ref 96–108)
CO2 SERPL-SCNC: 28 MMOL/L — SIGNIFICANT CHANGE UP (ref 22–31)
CREAT SERPL-MCNC: 1.25 MG/DL — SIGNIFICANT CHANGE UP (ref 0.5–1.3)
EOSINOPHIL # BLD AUTO: 0.13 K/UL — SIGNIFICANT CHANGE UP (ref 0–0.5)
EOSINOPHIL NFR BLD AUTO: 2.3 % — SIGNIFICANT CHANGE UP (ref 0–6)
FLUAV AG NPH QL: SIGNIFICANT CHANGE UP
FLUBV AG NPH QL: SIGNIFICANT CHANGE UP
GLUCOSE SERPL-MCNC: 92 MG/DL — SIGNIFICANT CHANGE UP (ref 70–99)
HCT VFR BLD CALC: 39.5 % — SIGNIFICANT CHANGE UP (ref 39–50)
HGB BLD-MCNC: 13.1 G/DL — SIGNIFICANT CHANGE UP (ref 13–17)
IMM GRANULOCYTES NFR BLD AUTO: 0.4 % — SIGNIFICANT CHANGE UP (ref 0–1.5)
LYMPHOCYTES # BLD AUTO: 2.24 K/UL — SIGNIFICANT CHANGE UP (ref 1–3.3)
LYMPHOCYTES # BLD AUTO: 39.9 % — SIGNIFICANT CHANGE UP (ref 13–44)
MCHC RBC-ENTMCNC: 27.9 PG — SIGNIFICANT CHANGE UP (ref 27–34)
MCHC RBC-ENTMCNC: 33.2 GM/DL — SIGNIFICANT CHANGE UP (ref 32–36)
MCV RBC AUTO: 84.2 FL — SIGNIFICANT CHANGE UP (ref 80–100)
MONOCYTES # BLD AUTO: 0.55 K/UL — SIGNIFICANT CHANGE UP (ref 0–0.9)
MONOCYTES NFR BLD AUTO: 9.8 % — SIGNIFICANT CHANGE UP (ref 2–14)
NEUTROPHILS # BLD AUTO: 2.66 K/UL — SIGNIFICANT CHANGE UP (ref 1.8–7.4)
NEUTROPHILS NFR BLD AUTO: 47.2 % — SIGNIFICANT CHANGE UP (ref 43–77)
NRBC # BLD: 0 /100 WBCS — SIGNIFICANT CHANGE UP (ref 0–0)
PLATELET # BLD AUTO: 201 K/UL — SIGNIFICANT CHANGE UP (ref 150–400)
POTASSIUM SERPL-MCNC: 3.5 MMOL/L — SIGNIFICANT CHANGE UP (ref 3.5–5.3)
POTASSIUM SERPL-SCNC: 3.5 MMOL/L — SIGNIFICANT CHANGE UP (ref 3.5–5.3)
PROT SERPL-MCNC: 7.3 GM/DL — SIGNIFICANT CHANGE UP (ref 6–8.3)
RBC # BLD: 4.69 M/UL — SIGNIFICANT CHANGE UP (ref 4.2–5.8)
RBC # FLD: 15.1 % — HIGH (ref 10.3–14.5)
SARS-COV-2 RNA SPEC QL NAA+PROBE: SIGNIFICANT CHANGE UP
SODIUM SERPL-SCNC: 141 MMOL/L — SIGNIFICANT CHANGE UP (ref 135–145)
TROPONIN I, HIGH SENSITIVITY RESULT: 11 NG/L — SIGNIFICANT CHANGE UP
WBC # BLD: 5.62 K/UL — SIGNIFICANT CHANGE UP (ref 3.8–10.5)
WBC # FLD AUTO: 5.62 K/UL — SIGNIFICANT CHANGE UP (ref 3.8–10.5)

## 2022-01-05 PROCEDURE — 99285 EMERGENCY DEPT VISIT HI MDM: CPT

## 2022-01-05 PROCEDURE — 93010 ELECTROCARDIOGRAM REPORT: CPT

## 2022-01-05 PROCEDURE — 99223 1ST HOSP IP/OBS HIGH 75: CPT

## 2022-01-05 PROCEDURE — 71046 X-RAY EXAM CHEST 2 VIEWS: CPT | Mod: 26

## 2022-01-05 RX ORDER — ASPIRIN/CALCIUM CARB/MAGNESIUM 324 MG
81 TABLET ORAL DAILY
Refills: 0 | Status: DISCONTINUED | OUTPATIENT
Start: 2022-01-05 | End: 2022-01-08

## 2022-01-05 RX ORDER — CLOPIDOGREL BISULFATE 75 MG/1
75 TABLET, FILM COATED ORAL ONCE
Refills: 0 | Status: DISCONTINUED | OUTPATIENT
Start: 2022-01-05 | End: 2022-01-05

## 2022-01-05 RX ORDER — TRAZODONE HCL 50 MG
100 TABLET ORAL DAILY
Refills: 0 | Status: DISCONTINUED | OUTPATIENT
Start: 2022-01-05 | End: 2022-01-05

## 2022-01-05 RX ORDER — METOPROLOL TARTRATE 50 MG
50 TABLET ORAL DAILY
Refills: 0 | Status: DISCONTINUED | OUTPATIENT
Start: 2022-01-05 | End: 2022-01-07

## 2022-01-05 RX ORDER — HYDROCHLOROTHIAZIDE 25 MG
25 TABLET ORAL DAILY
Refills: 0 | Status: DISCONTINUED | OUTPATIENT
Start: 2022-01-05 | End: 2022-01-08

## 2022-01-05 RX ORDER — AMLODIPINE BESYLATE 2.5 MG/1
10 TABLET ORAL DAILY
Refills: 0 | Status: DISCONTINUED | OUTPATIENT
Start: 2022-01-05 | End: 2022-01-07

## 2022-01-05 RX ORDER — TAMSULOSIN HYDROCHLORIDE 0.4 MG/1
0.4 CAPSULE ORAL AT BEDTIME
Refills: 0 | Status: DISCONTINUED | OUTPATIENT
Start: 2022-01-05 | End: 2022-01-08

## 2022-01-05 RX ORDER — ATORVASTATIN CALCIUM 80 MG/1
40 TABLET, FILM COATED ORAL AT BEDTIME
Refills: 0 | Status: DISCONTINUED | OUTPATIENT
Start: 2022-01-05 | End: 2022-01-08

## 2022-01-05 RX ADMIN — ATORVASTATIN CALCIUM 40 MILLIGRAM(S): 80 TABLET, FILM COATED ORAL at 23:09

## 2022-01-05 NOTE — H&P ADULT - ASSESSMENT
71 y.o Male PMH of CAD presented to the ED after having a episode of slurred speech and confusion a few days prior, he believed that he deanne a lot of alcohol however that was not the cause. Was here yesterday was found to have 70% blockage in the ICA, was going to be admitted however patient wanted to leave to turn off his heater.     Plan: Admit to medicine, MRI brain no-contrast ordered to r/o acute CVA. CT head notes chronic changes.  CTA notes 70 % right ICA stenosis.   Will   order cartoid dopplers as well.     Continue all home meds of Norvasc, ASA, Crestor, Toprol, Flomax and HCTZ.     Will need outpatient referral to Vascular Surgeon after discharge.

## 2022-01-05 NOTE — ED PROVIDER NOTE - OBJECTIVE STATEMENT
this is a 71 y.o male with a PMhx of CAD presented to the ED after having a episode of slurred speech and confusion a few days prior, he believed that he deanne a lot of alcohol however that was not the cause, patient was here yesterday was found to have 70% blockage in the ICA, was going to be admitted however patient wanted to leave to turn off his heater, patient however now returned for a work up, he also was having chest pain, no SOB, he has been eating and drinking without difficulties, deneis having any recent travel or leg swelling.

## 2022-01-05 NOTE — ED ADULT NURSE REASSESSMENT NOTE - NS ED NURSE REASSESS COMMENT FT1
pt is awake and alert, cooperative right now, vitals aware taken, pt in no sign of distress at this time

## 2022-01-05 NOTE — ED PROVIDER NOTE - ATTENDING CONTRIBUTION TO CARE
71 years old male here c/o slurred speech and chest pain Pt is alert and oriented x 3 speaking in clear ufll sentences no focal neuro deficits Agree with PA eval/treatment/dispo.

## 2022-01-05 NOTE — ED PROVIDER NOTE - NEUROLOGICAL, MLM
The patient is a 57y Female complaining of Alert and oriented, no focal deficits, no motor or sensory deficits.

## 2022-01-05 NOTE — ED ADULT NURSE NOTE - OBJECTIVE STATEMENT
pt a&o x4 ambualtory pt was scene yesterday and told  he had a blood clot but signed out ama was advised to return to the ER for admission. p/w palpitations, sob, and lightheaded, blurry vision since this AM, pt states ongoing symptoms but has resolved at this time. follows all commands, no chest pain now  no shortness of breath.

## 2022-01-05 NOTE — H&P ADULT - NSHPLABSRESULTS_GEN_ALL_CORE
LABS:                        13.1   5.62  )-----------( 201      ( 05 Jan 2022 16:10 )             39.5     01-05    141  |  106  |  19  ----------------------------<  92  3.5   |  28  |  1.25    Ca    8.8      05 Jan 2022 16:10    TPro  7.3  /  Alb  3.4  /  TBili  0.6  /  DBili  x   /  AST  22  /  ALT  21  /  AlkPhos  73  01-05    PT/INR - ( 04 Jan 2022 17:25 )   PT: 13.5 sec;   INR: 1.17 ratio         PTT - ( 04 Jan 2022 17:25 )  PTT:41.2 sec        RADIOLOGY & ADDITIONAL TESTS:

## 2022-01-05 NOTE — ED ADULT NURSE NOTE - IN THE PAST 12 MONTHS HAVE YOU USED DRUGS OTHER THAN THOSE REQUIRED FOR MEDICAL REASON?
Problem: Perioperative Period (Adult)  Goal: Signs and Symptoms of Listed Potential Problems Will be Absent or Manageable (Perioperative Period)  Outcome: Ongoing (interventions implemented as appropriate)    02/03/17 1213   Perioperative Period   Problems Assessed (Perioperative Period) situational response   Problems Present (Perioperative Period) situational response            No

## 2022-01-05 NOTE — ED ADULT NURSE NOTE - NSICDXPASTMEDICALHX_GEN_ALL_CORE_FT
PAST MEDICAL HISTORY:  CAD (coronary artery disease)     Hypercholesterolemia     Hypertension     Stented coronary artery

## 2022-01-05 NOTE — H&P ADULT - HISTORY OF PRESENT ILLNESS
71 y.o Male PMH of CAD presented to the ED after having a episode of slurred speech and confusion a few days prior, he believed that he deanne a lot of alcohol however that was not the cause. Was here yesterday was found to have 70% blockage in the ICA, was going to be admitted however patient wanted to leave to turn off his heater.     Now returns for a work up, namely MRI brain.

## 2022-01-05 NOTE — H&P ADULT - NSHPPHYSICALEXAM_GEN_ALL_CORE
PHYSICAL EXAMINATION:  Vital Signs Last 24 Hrs  T(C): 36.7 (05 Jan 2022 15:40), Max: 36.7 (05 Jan 2022 09:37)  T(F): 98.1 (05 Jan 2022 15:40), Max: 98.1 (05 Jan 2022 09:37)  HR: 63 (05 Jan 2022 15:40) (63 - 85)  BP: 129/75 (05 Jan 2022 15:40) (129/75 - 164/81)  BP(mean): --  RR: 12 (05 Jan 2022 15:40) (12 - 18)  SpO2: 94% (05 Jan 2022 15:40) (94% - 99%)  CAPILLARY BLOOD GLUCOSE          GENERAL: NAD, well-groomed, well-developed  HEAD:  atraumatic, normocephalic  EYES: sclera anicteric  ENMT: mucous membranes moist  NECK: supple, No JVD  CHEST/LUNG: clear to auscultation bilaterally; no rales, rhonchi, or wheezing b/l  HEART: normal S1, S2  ABDOMEN: BS+, soft, ND, NT   EXTREMITIES:  pulses palpable; no clubbing, cyanosis, or edema b/l LEs  NEURO: awake, alert, interactive; moves all extremities  SKIN: no rashes or lesions PHYSICAL EXAMINATION:  Vital Signs Last 24 Hrs  T(C): 36.7 (05 Jan 2022 15:40), Max: 36.7 (05 Jan 2022 09:37)  T(F): 98.1 (05 Jan 2022 15:40), Max: 98.1 (05 Jan 2022 09:37)  HR: 63 (05 Jan 2022 15:40) (63 - 85)  BP: 129/75 (05 Jan 2022 15:40) (129/75 - 164/81)  BP(mean): --  RR: 12 (05 Jan 2022 15:40) (12 - 18)  SpO2: 94% (05 Jan 2022 15:40) (94% - 99%)  CAPILLARY BLOOD GLUCOSE          GENERAL: NAD, seen in ER, comfortable, no CP or SOB  HEAD:  atraumatic, normocephalic  EYES: sclera anicteric  ENMT: mucous membranes moist  NECK: supple, No JVD  CHEST/LUNG: clear to auscultation bilaterally; no rales, rhonchi, or wheezing b/l  HEART: normal S1, S2  ABDOMEN: BS+, soft, ND, NT   EXTREMITIES:  pulses palpable; no clubbing, cyanosis, or edema b/l LEs  NEURO: awake, alert, interactive; moves all extremities  SKIN: no rashes or lesions

## 2022-01-05 NOTE — ED PROVIDER NOTE - CLINICAL SUMMARY MEDICAL DECISION MAKING FREE TEXT BOX
this is a 71 y.o male with a PMhx of CAD presented to the ED after having a episode of slurred speech and confusion a few days prior, he is also having CHest pain-labs, ekg, trop chest xray, reassess

## 2022-01-05 NOTE — ED ADULT TRIAGE NOTE - CHIEF COMPLAINT QUOTE
p/w palpitations, sob, and lightheaded, blurry vision since this AM, seen/DC in ED last night, advised by Md to return today for MRI.

## 2022-01-06 LAB
APPEARANCE UR: CLEAR — SIGNIFICANT CHANGE UP
BACTERIA # UR AUTO: ABNORMAL
BILIRUB UR-MCNC: NEGATIVE — SIGNIFICANT CHANGE UP
COD CRY URNS QL: ABNORMAL
COLOR SPEC: YELLOW — SIGNIFICANT CHANGE UP
DIFF PNL FLD: NEGATIVE — SIGNIFICANT CHANGE UP
EPI CELLS # UR: SIGNIFICANT CHANGE UP
GLUCOSE BLDC GLUCOMTR-MCNC: 118 MG/DL — HIGH (ref 70–99)
GLUCOSE UR QL: NEGATIVE MG/DL — SIGNIFICANT CHANGE UP
HCV AB S/CO SERPL IA: 0.13 S/CO — SIGNIFICANT CHANGE UP (ref 0–0.99)
HCV AB SERPL-IMP: SIGNIFICANT CHANGE UP
KETONES UR-MCNC: ABNORMAL
LEUKOCYTE ESTERASE UR-ACNC: ABNORMAL
NITRITE UR-MCNC: NEGATIVE — SIGNIFICANT CHANGE UP
PH UR: 6 — SIGNIFICANT CHANGE UP (ref 5–8)
PROT UR-MCNC: 30 MG/DL
RBC CASTS # UR COMP ASSIST: SIGNIFICANT CHANGE UP /HPF (ref 0–4)
SP GR SPEC: 1.02 — SIGNIFICANT CHANGE UP (ref 1.01–1.02)
TROPONIN I, HIGH SENSITIVITY RESULT: 13 NG/L — SIGNIFICANT CHANGE UP
UROBILINOGEN FLD QL: 4 MG/DL
WBC UR QL: SIGNIFICANT CHANGE UP

## 2022-01-06 PROCEDURE — 99233 SBSQ HOSP IP/OBS HIGH 50: CPT

## 2022-01-06 PROCEDURE — 93880 EXTRACRANIAL BILAT STUDY: CPT | Mod: 26

## 2022-01-06 PROCEDURE — 93306 TTE W/DOPPLER COMPLETE: CPT | Mod: 26

## 2022-01-06 RX ADMIN — TAMSULOSIN HYDROCHLORIDE 0.4 MILLIGRAM(S): 0.4 CAPSULE ORAL at 21:10

## 2022-01-06 RX ADMIN — Medication 25 MILLIGRAM(S): at 05:38

## 2022-01-06 RX ADMIN — ATORVASTATIN CALCIUM 40 MILLIGRAM(S): 80 TABLET, FILM COATED ORAL at 21:10

## 2022-01-06 RX ADMIN — AMLODIPINE BESYLATE 10 MILLIGRAM(S): 2.5 TABLET ORAL at 05:38

## 2022-01-06 RX ADMIN — Medication 81 MILLIGRAM(S): at 11:29

## 2022-01-06 NOTE — PATIENT PROFILE ADULT - FALL HARM RISK - UNIVERSAL INTERVENTIONS
Bed in lowest position, wheels locked, appropriate side rails in place/Call bell, personal items and telephone in reach/Instruct patient to call for assistance before getting out of bed or chair/Non-slip footwear when patient is out of bed/[place] to call system/Physically safe environment - no spills, clutter or unnecessary equipment/Purposeful Proactive Rounding/Room/bathroom lighting operational, light cord in reach

## 2022-01-06 NOTE — PATIENT PROFILE ADULT - FOOD INSECURITY
70 y/o M, no signifucant PMH, p/w L knee pain onset about 3 hours pta s/p mechanical trip and fall down 6 steps. pt states he was carrying a table up the stairs when he lost his footing and fell backward, landing on his bottom and hitting his L knee on the ground. no LOC. crawled back up the stairs. took Tylenol and aleve which provides minimal relief of symptoms. pt states his pain was exacerbated with walking and movement. pt did not hit his head. denies LOC, numbness, tingling, weakness 68 y/o M, no significant PMH, p/w L knee pain onset about 3 hours pta s/p mechanical trip and fall down 6 steps. pt states he was carrying a table up the stairs when he lost his footing and fell backward, landing on his bottom and hitting his L knee on the ground. no LOC. crawled back up the stairs. took Tylenol and aleve which provides minimal relief of symptoms. pt states his pain was exacerbated with walking and movement. pt did not hit his head. denies LOC, numbness, tingling, weakness 68 y/o M, no significant PMH, p/w L knee pain onset about 3 hours pta s/p mechanical trip and fall down 6 steps. pt states he was carrying a table up the stairs when he lost his footing and fell backward, landing on his bottom and hit his L knee on the ground. no LOC. crawled back up the stairs. took Tylenol and aleve which provides minimal relief of symptoms. pt states his pain was exacerbated with walking and movement. pt did not hit his head. denies LOC, numbness, tingling, weakness no

## 2022-01-06 NOTE — PROGRESS NOTE ADULT - ASSESSMENT
71 y.o Male PMH of CAD presented to the ED after having a episode of slurred speech and confusion a few days prior, he believed that he deanne a lot of alcohol however that was not the cause. Was here  1/4 was found to have 70% blockage in the ICA, was going to be admitted however patient wanted to leave to turn off his heater.     #ICU occlusion  - MRI brain no-contrast ordered to r/o acute CVA.   - CT head notes chronic changes.  CTA notes 70 % right ICA stenosis.  - Carotid dopplers noted. TTE, lipids, Hba1C, TSH, b12 pending  - neuro eval requested    #CAD  - Continue all home meds of Norvasc, ASA, Crestor, Toprol, HCTZ    #BPH   - c/w Flomax    Will need outpatient referral to Vascular Surgeon after discharge.  71 y.o Male PMH of CAD presented to the ED after having a episode of slurred speech and confusion a few days prior, he believed that he deanne a lot of alcohol however that was not the cause. Was here  1/4 was found to have 70% blockage in the ICA, was going to be admitted however patient wanted to leave to turn off his heater.     #ICU occlusion  - MRI brain no-contrast ordered to r/o acute CVA.   - CT head notes chronic changes.  CTA notes 70 % right ICA stenosis.  - Carotid dopplers noted. TTE, lipids, Hba1C, TSH, b12 pending  - neuro eval requested    #CAD  - Continue all home meds of Norvasc, ASA, Crestor, Toprol, HCTZ    #BPH   - c/w Flomax    #DVTppx  - SCDs

## 2022-01-06 NOTE — PROGRESS NOTE ADULT - SUBJECTIVE AND OBJECTIVE BOX
Patient is a 71y old  Male who presents with a chief complaint of Return to hospital for brain MRI. (2022 18:49)      INTERVAL HPI/OVERNIGHT EVENTS:    MEDICATIONS  (STANDING):  amLODIPine   Tablet 10 milliGRAM(s) Oral daily  aspirin enteric coated 81 milliGRAM(s) Oral daily  atorvastatin 40 milliGRAM(s) Oral at bedtime  hydrochlorothiazide 25 milliGRAM(s) Oral daily  metoprolol succinate ER 50 milliGRAM(s) Oral daily  tamsulosin 0.4 milliGRAM(s) Oral at bedtime    MEDICATIONS  (PRN):      Allergies    No Known Allergies    Intolerances        REVIEW OF SYSTEMS:  CONSTITUTIONAL: No fever or chills  HEENT:  No headache, no sore throat  RESPIRATORY: No cough, wheezing, or shortness of breath  CARDIOVASCULAR: No chest pain, palpitations, or leg swelling  GASTROINTESTINAL: No abd pain, nausea, vomiting, or diarrhea  GENITOURINARY: No dysuria, frequency, or hematuria  NEUROLOGICAL: no focal weakness or dizziness  MUSCULOSKELETAL: no myalgias     Vital Signs Last 24 Hrs  T(C): 36.9 (2022 07:19), Max: 36.9 (2022 07:19)  T(F): 98.5 (2022 07:19), Max: 98.5 (2022 07:19)  HR: 63 (2022 07:19) (62 - 82)  BP: 149/75 (2022 07:19) (124/63 - 154/77)  BP(mean): --  RR: 16 (2022 07:19) (12 - 19)  SpO2: 97% (2022 07:19) (95% - 98%)    PHYSICAL EXAM:  GENERAL: NAD  HEENT:  NC/AT, EOMI, moist mucous membranes  CHEST/LUNG:  CTA b/l, no rales, wheezes, or rhonchi  HEART:  RRR, S1, S2  ABDOMEN:  BS+, soft, nontender, nondistended  EXTREMITIES: no edema, cyanosis, or calf tenderness  NERVOUS SYSTEM: AA&Ox3    LABS:    CBC Full  -  ( 2022 16:10 )  WBC Count : 5.62 K/uL  Hemoglobin : 13.1 g/dL  Hematocrit : 39.5 %  Platelet Count - Automated : 201 K/uL  Mean Cell Volume : 84.2 fl  Mean Cell Hemoglobin : 27.9 pg  Mean Cell Hemoglobin Concentration : 33.2 gm/dL  Auto Neutrophil # : 2.66 K/uL  Auto Lymphocyte # : 2.24 K/uL  Auto Monocyte # : 0.55 K/uL  Auto Eosinophil # : 0.13 K/uL  Auto Basophil # : 0.02 K/uL  Auto Neutrophil % : 47.2 %  Auto Lymphocyte % : 39.9 %  Auto Monocyte % : 9.8 %  Auto Eosinophil % : 2.3 %  Auto Basophil % : 0.4 %      Ca    8.8        2022 16:10        Urinalysis Basic - ( 2022 23:50 )    Color: Yellow / Appearance: Clear / S.025 / pH: x  Gluc: x / Ketone: Trace  / Bili: Negative / Urobili: 4 mg/dL   Blood: x / Protein: 30 mg/dL / Nitrite: Negative   Leuk Esterase: Trace / RBC: 0-2 /HPF / WBC 0-2   Sq Epi: x / Non Sq Epi: Occasional / Bacteria: Few      CAPILLARY BLOOD GLUCOSE              RADIOLOGY & ADDITIONAL TESTS:    Personally reviewed.     Consultant(s) Notes Reviewed:  [x] YES  [ ] NO    Care Discussed with [x] Consultants  [x] Patient  [ ] Family  [ ]      [ ] Other; RN  DVT ppx   Patient is a 71y old  Male who presents with a chief complaint of Return to hospital for brain MRI. (2022 18:49)      INTERVAL HPI/OVERNIGHT EVENTS: Pt seen and examined at bedside. admits L fingertips numbness    MEDICATIONS  (STANDING):  amLODIPine   Tablet 10 milliGRAM(s) Oral daily  aspirin enteric coated 81 milliGRAM(s) Oral daily  atorvastatin 40 milliGRAM(s) Oral at bedtime  hydrochlorothiazide 25 milliGRAM(s) Oral daily  metoprolol succinate ER 50 milliGRAM(s) Oral daily  tamsulosin 0.4 milliGRAM(s) Oral at bedtime    MEDICATIONS  (PRN):      Allergies    No Known Allergies    Intolerances        REVIEW OF SYSTEMS:  CONSTITUTIONAL: No fever or chills  HEENT:  No headache, no sore throat  RESPIRATORY: No cough, wheezing, or shortness of breath  CARDIOVASCULAR: No chest pain, palpitations, or leg swelling  GASTROINTESTINAL: No abd pain, nausea, vomiting, or diarrhea  GENITOURINARY: No dysuria, frequency, or hematuria  NEUROLOGICAL: see hpi  MUSCULOSKELETAL: no myalgias     Vital Signs Last 24 Hrs  T(C): 36.9 (2022 07:19), Max: 36.9 (2022 07:19)  T(F): 98.5 (2022 07:19), Max: 98.5 (2022 07:19)  HR: 63 (2022 07:19) (62 - 82)  BP: 149/75 (2022 07:19) (124/63 - 154/77)  BP(mean): --  RR: 16 (2022 07:19) (12 - 19)  SpO2: 97% (2022 07:19) (95% - 98%)    PHYSICAL EXAM:  GENERAL: M in NAD  HEENT:  NC/AT, EOMI, moist mucous membranes  CHEST/LUNG:  CTA b/l, no rales, wheezes, or rhonchi  HEART:  RRR, S1, S2  ABDOMEN:  BS+, soft, nontender, nondistended  EXTREMITIES: no edema, cyanosis, or calf tenderness  NERVOUS SYSTEM: AA&Ox3    LABS:    CBC Full  -  ( 2022 16:10 )  WBC Count : 5.62 K/uL  Hemoglobin : 13.1 g/dL  Hematocrit : 39.5 %  Platelet Count - Automated : 201 K/uL  Mean Cell Volume : 84.2 fl  Mean Cell Hemoglobin : 27.9 pg  Mean Cell Hemoglobin Concentration : 33.2 gm/dL  Auto Neutrophil # : 2.66 K/uL  Auto Lymphocyte # : 2.24 K/uL  Auto Monocyte # : 0.55 K/uL  Auto Eosinophil # : 0.13 K/uL  Auto Basophil # : 0.02 K/uL  Auto Neutrophil % : 47.2 %  Auto Lymphocyte % : 39.9 %  Auto Monocyte % : 9.8 %  Auto Eosinophil % : 2.3 %  Auto Basophil % : 0.4 %      Ca    8.8        2022 16:10        Urinalysis Basic - ( 2022 23:50 )    Color: Yellow / Appearance: Clear / S.025 / pH: x  Gluc: x / Ketone: Trace  / Bili: Negative / Urobili: 4 mg/dL   Blood: x / Protein: 30 mg/dL / Nitrite: Negative   Leuk Esterase: Trace / RBC: 0-2 /HPF / WBC 0-2   Sq Epi: x / Non Sq Epi: Occasional / Bacteria: Few      CAPILLARY BLOOD GLUCOSE              RADIOLOGY & ADDITIONAL TESTS:    Personally reviewed.     Consultant(s) Notes Reviewed:  [x] YES  [ ] NO    Care Discussed with [x] Consultants  [x] Patient  [ ] Family  [ ]      [ ] Other; RN  DVT ppx

## 2022-01-07 LAB
A1C WITH ESTIMATED AVERAGE GLUCOSE RESULT: 6 % — HIGH (ref 4–5.6)
ALBUMIN SERPL ELPH-MCNC: 3 G/DL — LOW (ref 3.3–5)
ALP SERPL-CCNC: 68 U/L — SIGNIFICANT CHANGE UP (ref 40–120)
ALT FLD-CCNC: 18 U/L — SIGNIFICANT CHANGE UP (ref 12–78)
ANION GAP SERPL CALC-SCNC: 5 MMOL/L — SIGNIFICANT CHANGE UP (ref 5–17)
ANION GAP SERPL CALC-SCNC: 6 MMOL/L — SIGNIFICANT CHANGE UP (ref 5–17)
AST SERPL-CCNC: 18 U/L — SIGNIFICANT CHANGE UP (ref 15–37)
BASOPHILS # BLD AUTO: 0.02 K/UL — SIGNIFICANT CHANGE UP (ref 0–0.2)
BASOPHILS NFR BLD AUTO: 0.4 % — SIGNIFICANT CHANGE UP (ref 0–2)
BILIRUB SERPL-MCNC: 0.4 MG/DL — SIGNIFICANT CHANGE UP (ref 0.2–1.2)
BUN SERPL-MCNC: 18 MG/DL — SIGNIFICANT CHANGE UP (ref 7–23)
BUN SERPL-MCNC: 18 MG/DL — SIGNIFICANT CHANGE UP (ref 7–23)
CALCIUM SERPL-MCNC: 8.5 MG/DL — SIGNIFICANT CHANGE UP (ref 8.5–10.1)
CALCIUM SERPL-MCNC: 8.9 MG/DL — SIGNIFICANT CHANGE UP (ref 8.5–10.1)
CHLORIDE SERPL-SCNC: 106 MMOL/L — SIGNIFICANT CHANGE UP (ref 96–108)
CHLORIDE SERPL-SCNC: 106 MMOL/L — SIGNIFICANT CHANGE UP (ref 96–108)
CHOLEST SERPL-MCNC: 157 MG/DL — SIGNIFICANT CHANGE UP
CO2 SERPL-SCNC: 29 MMOL/L — SIGNIFICANT CHANGE UP (ref 22–31)
CO2 SERPL-SCNC: 29 MMOL/L — SIGNIFICANT CHANGE UP (ref 22–31)
CREAT SERPL-MCNC: 1.22 MG/DL — SIGNIFICANT CHANGE UP (ref 0.5–1.3)
CREAT SERPL-MCNC: 1.3 MG/DL — SIGNIFICANT CHANGE UP (ref 0.5–1.3)
EOSINOPHIL # BLD AUTO: 0.15 K/UL — SIGNIFICANT CHANGE UP (ref 0–0.5)
EOSINOPHIL NFR BLD AUTO: 3 % — SIGNIFICANT CHANGE UP (ref 0–6)
ESTIMATED AVERAGE GLUCOSE: 126 MG/DL — HIGH (ref 68–114)
GLUCOSE BLDC GLUCOMTR-MCNC: 112 MG/DL — HIGH (ref 70–99)
GLUCOSE SERPL-MCNC: 97 MG/DL — SIGNIFICANT CHANGE UP (ref 70–99)
GLUCOSE SERPL-MCNC: 98 MG/DL — SIGNIFICANT CHANGE UP (ref 70–99)
HCT VFR BLD CALC: 38 % — LOW (ref 39–50)
HDLC SERPL-MCNC: 37 MG/DL — LOW
HGB BLD-MCNC: 12.8 G/DL — LOW (ref 13–17)
IMM GRANULOCYTES NFR BLD AUTO: 0.2 % — SIGNIFICANT CHANGE UP (ref 0–1.5)
LIPID PNL WITH DIRECT LDL SERPL: 98 MG/DL — SIGNIFICANT CHANGE UP
LYMPHOCYTES # BLD AUTO: 1.9 K/UL — SIGNIFICANT CHANGE UP (ref 1–3.3)
LYMPHOCYTES # BLD AUTO: 38.5 % — SIGNIFICANT CHANGE UP (ref 13–44)
MCHC RBC-ENTMCNC: 28.3 PG — SIGNIFICANT CHANGE UP (ref 27–34)
MCHC RBC-ENTMCNC: 33.7 GM/DL — SIGNIFICANT CHANGE UP (ref 32–36)
MCV RBC AUTO: 83.9 FL — SIGNIFICANT CHANGE UP (ref 80–100)
MONOCYTES # BLD AUTO: 0.47 K/UL — SIGNIFICANT CHANGE UP (ref 0–0.9)
MONOCYTES NFR BLD AUTO: 9.5 % — SIGNIFICANT CHANGE UP (ref 2–14)
NEUTROPHILS # BLD AUTO: 2.38 K/UL — SIGNIFICANT CHANGE UP (ref 1.8–7.4)
NEUTROPHILS NFR BLD AUTO: 48.4 % — SIGNIFICANT CHANGE UP (ref 43–77)
NON HDL CHOLESTEROL: 120 MG/DL — SIGNIFICANT CHANGE UP
NRBC # BLD: 0 /100 WBCS — SIGNIFICANT CHANGE UP (ref 0–0)
PLATELET # BLD AUTO: 180 K/UL — SIGNIFICANT CHANGE UP (ref 150–400)
POTASSIUM SERPL-MCNC: 3.1 MMOL/L — LOW (ref 3.5–5.3)
POTASSIUM SERPL-MCNC: 3.4 MMOL/L — LOW (ref 3.5–5.3)
POTASSIUM SERPL-SCNC: 3.1 MMOL/L — LOW (ref 3.5–5.3)
POTASSIUM SERPL-SCNC: 3.4 MMOL/L — LOW (ref 3.5–5.3)
PROT SERPL-MCNC: 6.5 GM/DL — SIGNIFICANT CHANGE UP (ref 6–8.3)
RBC # BLD: 4.53 M/UL — SIGNIFICANT CHANGE UP (ref 4.2–5.8)
RBC # FLD: 14.6 % — HIGH (ref 10.3–14.5)
SODIUM SERPL-SCNC: 140 MMOL/L — SIGNIFICANT CHANGE UP (ref 135–145)
SODIUM SERPL-SCNC: 141 MMOL/L — SIGNIFICANT CHANGE UP (ref 135–145)
TRIGL SERPL-MCNC: 109 MG/DL — SIGNIFICANT CHANGE UP
TSH SERPL-MCNC: 2.52 UIU/ML — SIGNIFICANT CHANGE UP (ref 0.36–3.74)
VIT B12 SERPL-MCNC: 365 PG/ML — SIGNIFICANT CHANGE UP (ref 232–1245)
WBC # BLD: 4.93 K/UL — SIGNIFICANT CHANGE UP (ref 3.8–10.5)
WBC # FLD AUTO: 4.93 K/UL — SIGNIFICANT CHANGE UP (ref 3.8–10.5)

## 2022-01-07 PROCEDURE — 99222 1ST HOSP IP/OBS MODERATE 55: CPT

## 2022-01-07 PROCEDURE — 99232 SBSQ HOSP IP/OBS MODERATE 35: CPT

## 2022-01-07 PROCEDURE — 70551 MRI BRAIN STEM W/O DYE: CPT | Mod: 26

## 2022-01-07 PROCEDURE — 99223 1ST HOSP IP/OBS HIGH 75: CPT

## 2022-01-07 RX ORDER — POTASSIUM CHLORIDE 20 MEQ
40 PACKET (EA) ORAL EVERY 4 HOURS
Refills: 0 | Status: COMPLETED | OUTPATIENT
Start: 2022-01-07 | End: 2022-01-07

## 2022-01-07 RX ORDER — DIAZEPAM 5 MG
5 TABLET ORAL ONCE
Refills: 0 | Status: DISCONTINUED | OUTPATIENT
Start: 2022-01-07 | End: 2022-01-07

## 2022-01-07 RX ORDER — METOPROLOL TARTRATE 50 MG
25 TABLET ORAL DAILY
Refills: 0 | Status: DISCONTINUED | OUTPATIENT
Start: 2022-01-07 | End: 2022-01-08

## 2022-01-07 RX ORDER — LANOLIN ALCOHOL/MO/W.PET/CERES
6 CREAM (GRAM) TOPICAL AT BEDTIME
Refills: 0 | Status: DISCONTINUED | OUTPATIENT
Start: 2022-01-07 | End: 2022-01-08

## 2022-01-07 RX ORDER — SODIUM CHLORIDE 9 MG/ML
1000 INJECTION INTRAMUSCULAR; INTRAVENOUS; SUBCUTANEOUS
Refills: 0 | Status: DISCONTINUED | OUTPATIENT
Start: 2022-01-07 | End: 2022-01-08

## 2022-01-07 RX ADMIN — AMLODIPINE BESYLATE 10 MILLIGRAM(S): 2.5 TABLET ORAL at 05:05

## 2022-01-07 RX ADMIN — ATORVASTATIN CALCIUM 40 MILLIGRAM(S): 80 TABLET, FILM COATED ORAL at 21:12

## 2022-01-07 RX ADMIN — Medication 40 MILLIEQUIVALENT(S): at 11:41

## 2022-01-07 RX ADMIN — TAMSULOSIN HYDROCHLORIDE 0.4 MILLIGRAM(S): 0.4 CAPSULE ORAL at 21:13

## 2022-01-07 RX ADMIN — Medication 81 MILLIGRAM(S): at 11:41

## 2022-01-07 RX ADMIN — Medication 5 MILLIGRAM(S): at 09:34

## 2022-01-07 RX ADMIN — Medication 6 MILLIGRAM(S): at 21:13

## 2022-01-07 RX ADMIN — Medication 50 MILLIGRAM(S): at 05:06

## 2022-01-07 RX ADMIN — Medication 200 MILLIGRAM(S): at 21:13

## 2022-01-07 RX ADMIN — Medication 40 MILLIEQUIVALENT(S): at 13:39

## 2022-01-07 RX ADMIN — SODIUM CHLORIDE 100 MILLILITER(S): 9 INJECTION INTRAMUSCULAR; INTRAVENOUS; SUBCUTANEOUS at 13:35

## 2022-01-07 RX ADMIN — Medication 25 MILLIGRAM(S): at 05:05

## 2022-01-07 NOTE — PROGRESS NOTE ADULT - SUBJECTIVE AND OBJECTIVE BOX
Patient is a 71y old  Male who presents with a chief complaint of Return to hospital for brain MRI. (2022 12:47)      INTERVAL HPI/OVERNIGHT EVENTS:    MEDICATIONS  (STANDING):  aspirin enteric coated 81 milliGRAM(s) Oral daily  atorvastatin 40 milliGRAM(s) Oral at bedtime  hydrochlorothiazide 25 milliGRAM(s) Oral daily  metoprolol succinate ER 25 milliGRAM(s) Oral daily  sodium chloride 0.9%. 1000 milliLiter(s) (100 mL/Hr) IV Continuous <Continuous>  tamsulosin 0.4 milliGRAM(s) Oral at bedtime    MEDICATIONS  (PRN):      Allergies    No Known Allergies    Intolerances        REVIEW OF SYSTEMS:  CONSTITUTIONAL: No fever or chills  HEENT:  No headache, no sore throat  RESPIRATORY: No cough, wheezing, or shortness of breath  CARDIOVASCULAR: No chest pain, palpitations, or leg swelling  GASTROINTESTINAL: No abd pain, nausea, vomiting, or diarrhea  GENITOURINARY: No dysuria, frequency, or hematuria  NEUROLOGICAL: no focal weakness or dizziness  MUSCULOSKELETAL: no myalgias     Vital Signs Last 24 Hrs  T(C): 36.5 (2022 10:22), Max: 36.7 (2022 23:09)  T(F): 97.7 (2022 10:22), Max: 98.1 (2022 23:09)  HR: 68 (2022 10:22) (67 - 74)  BP: 148/69 (2022 10:22) (124/74 - 148/69)  BP(mean): --  RR: 17 (2022 10:22) (17 - 18)  SpO2: 98% (2022 10:22) (96% - 98%)    PHYSICAL EXAM:  GENERAL: NAD  HEENT:  NC/AT, EOMI, moist mucous membranes  CHEST/LUNG:  CTA b/l, no rales, wheezes, or rhonchi  HEART:  RRR, S1, S2  ABDOMEN:  BS+, soft, nontender, nondistended  EXTREMITIES: no edema, cyanosis, or calf tenderness  NERVOUS SYSTEM: AA&Ox3    LABS:                        12.8   4.93  )-----------( 180      ( 2022 07:09 )             38.0     CBC Full  -  ( 2022 07:09 )  WBC Count : 4.93 K/uL  Hemoglobin : 12.8 g/dL  Hematocrit : 38.0 %  Platelet Count - Automated : 180 K/uL  Mean Cell Volume : 83.9 fl  Mean Cell Hemoglobin : 28.3 pg  Mean Cell Hemoglobin Concentration : 33.7 gm/dL  Auto Neutrophil # : 2.38 K/uL  Auto Lymphocyte # : 1.90 K/uL  Auto Monocyte # : 0.47 K/uL  Auto Eosinophil # : 0.15 K/uL  Auto Basophil # : 0.02 K/uL  Auto Neutrophil % : 48.4 %  Auto Lymphocyte % : 38.5 %  Auto Monocyte % : 9.5 %  Auto Eosinophil % : 3.0 %  Auto Basophil % : 0.4 %    2022 07:09    141    |  106    |  18     ----------------------------<  98     3.1     |  29     |  1.22     Ca    8.9        2022 07:09    TPro  6.5    /  Alb  3.0    /  TBili  0.4    /  DBili  x      /  AST  18     /  ALT  18     /  AlkPhos  68     2022 07:09      Urinalysis Basic - ( 2022 23:50 )    Color: Yellow / Appearance: Clear / S.025 / pH: x  Gluc: x / Ketone: Trace  / Bili: Negative / Urobili: 4 mg/dL   Blood: x / Protein: 30 mg/dL / Nitrite: Negative   Leuk Esterase: Trace / RBC: 0-2 /HPF / WBC 0-2   Sq Epi: x / Non Sq Epi: Occasional / Bacteria: Few      CAPILLARY BLOOD GLUCOSE      POCT Blood Glucose.: 112 mg/dL (2022 07:41)  POCT Blood Glucose.: 118 mg/dL (2022 23:17)          RADIOLOGY & ADDITIONAL TESTS:    Personally reviewed.     Consultant(s) Notes Reviewed:  [x] YES  [ ] NO    Care Discussed with [x] Consultants  [x] Patient  [ ] Family  [ ]      [ ] Other; RN  DVT ppx   Patient is a 71y old  Male who presents with a chief complaint of Return to hospital for brain MRI. (2022 12:47)      INTERVAL HPI/OVERNIGHT EVENTS: Pt seen and examined at bedside. admits continued L hand weakness and dec sensation.     MEDICATIONS  (STANDING):  aspirin enteric coated 81 milliGRAM(s) Oral daily  atorvastatin 40 milliGRAM(s) Oral at bedtime  hydrochlorothiazide 25 milliGRAM(s) Oral daily  metoprolol succinate ER 25 milliGRAM(s) Oral daily  sodium chloride 0.9%. 1000 milliLiter(s) (100 mL/Hr) IV Continuous <Continuous>  tamsulosin 0.4 milliGRAM(s) Oral at bedtime    MEDICATIONS  (PRN):      Allergies    No Known Allergies    Intolerances        REVIEW OF SYSTEMS:  CONSTITUTIONAL: No fever or chills  HEENT:  No headache, no sore throat  RESPIRATORY: No cough, wheezing, or shortness of breath  CARDIOVASCULAR: No chest pain, palpitations, or leg swelling  GASTROINTESTINAL: No abd pain, nausea, vomiting, or diarrhea  GENITOURINARY: No dysuria, frequency, or hematuria  NEUROLOGICAL: see hpi  MUSCULOSKELETAL: no myalgias     Vital Signs Last 24 Hrs  T(C): 36.5 (2022 10:22), Max: 36.7 (2022 23:09)  T(F): 97.7 (2022 10:22), Max: 98.1 (2022 23:09)  HR: 68 (2022 10:22) (67 - 74)  BP: 148/69 (2022 10:22) (124/74 - 148/69)  BP(mean): --  RR: 17 (2022 10:22) (17 - 18)  SpO2: 98% (2022 10:22) (96% - 98%)    PHYSICAL EXAM:  GENERAL: M in NAD  HEENT:  NC/AT, EOMI, moist mucous membranes  CHEST/LUNG: CTA b/l, no rales, wheezes, or rhonchi  HEART: RRR, S1, S2  ABDOMEN:  BS+, soft, nontender, nondistended  EXTREMITIES: no edema, cyanosis, or calf tenderness  NERVOUS SYSTEM: AA&Ox3, loss of L hand  strength, dec sensation fingertips L hand    LABS:                        12.8   4.93  )-----------( 180      ( 2022 07:09 )             38.0     CBC Full  -  ( 2022 07:09 )  WBC Count : 4.93 K/uL  Hemoglobin : 12.8 g/dL  Hematocrit : 38.0 %  Platelet Count - Automated : 180 K/uL  Mean Cell Volume : 83.9 fl  Mean Cell Hemoglobin : 28.3 pg  Mean Cell Hemoglobin Concentration : 33.7 gm/dL  Auto Neutrophil # : 2.38 K/uL  Auto Lymphocyte # : 1.90 K/uL  Auto Monocyte # : 0.47 K/uL  Auto Eosinophil # : 0.15 K/uL  Auto Basophil # : 0.02 K/uL  Auto Neutrophil % : 48.4 %  Auto Lymphocyte % : 38.5 %  Auto Monocyte % : 9.5 %  Auto Eosinophil % : 3.0 %  Auto Basophil % : 0.4 %    2022 07:09    141    |  106    |  18     ----------------------------<  98     3.1     |  29     |  1.22     Ca    8.9        2022 07:09    TPro  6.5    /  Alb  3.0    /  TBili  0.4    /  DBili  x      /  AST  18     /  ALT  18     /  AlkPhos  68     2022 07:09      Urinalysis Basic - ( 2022 23:50 )    Color: Yellow / Appearance: Clear / S.025 / pH: x  Gluc: x / Ketone: Trace  / Bili: Negative / Urobili: 4 mg/dL   Blood: x / Protein: 30 mg/dL / Nitrite: Negative   Leuk Esterase: Trace / RBC: 0-2 /HPF / WBC 0-2   Sq Epi: x / Non Sq Epi: Occasional / Bacteria: Few      CAPILLARY BLOOD GLUCOSE      POCT Blood Glucose.: 112 mg/dL (2022 07:41)  POCT Blood Glucose.: 118 mg/dL (2022 23:17)          RADIOLOGY & ADDITIONAL TESTS:    Personally reviewed.     Consultant(s) Notes Reviewed:  [x] YES  [ ] NO    Care Discussed with [x] Consultants  [x] Patient  [ ] Family  [ ]      [ ] Other; RN  DVT ppx

## 2022-01-07 NOTE — CONSULT NOTE ADULT - SUBJECTIVE AND OBJECTIVE BOX
Vascular Attending:      HPI:  71 y.o Male PMH of CAD presented to the ED after having a episode of slurred speech and confusion a few days prior, he believed that he deanne a lot of alcohol however that was not the cause. Was here yesterday was found to have 70% blockage in the ICA, was going to be admitted however patient wanted to leave to turn off his heater.     Now returns for a work up, namely MRI brain.   (2022 18:49)      Pt was seen bedside. He states he feels well. Pt endorses "I had a stroke on [place]" and was seen slurring his speech although he has no recollection of it. Pt did not lose consciousness. He admits to using marijuana that day.  He also noted vision changes and "brain fogginess" over the past week, so he decided to come to ER for evaluation. Pt was able to perform normal activities and works as a  but also c/o left sided upper and lower extremity weakness. No h/o similar. Does report family h/o stroke and HTN in both parents. Pt denies fever, chills, dyspnea, cough.        PAST MEDICAL & SURGICAL HISTORY:  Hypertension  Hypercholesterolemia  CAD (coronary artery disease)  Stented coronary artery  No significant past surgical history      Allergies  No Known Allergies      SOCIAL HISTORY: no tobacco use. Occasional ETOH use, wine 3 days per month.     Vital Signs Last 24 Hrs  T(C): 36.5 (2022 10:22), Max: 36.7 (2022 23:09)  T(F): 97.7 (2022 10:22), Max: 98.1 (2022 23:09)  HR: 68 (2022 10:22) (67 - 74)  BP: 148/69 (2022 10:22) (124/74 - 148/69)  BP(mean): --  RR: 17 (2022 10:22) (17 - 18)  SpO2: 98% (2022 10:22) (96% - 98%)    Gen: NAD, WDWN  HEENT: NCAT, EOMI  CV: +S1S2  Lung: good aeration b/l, nonlabored respirations  Abd: soft, nontender  Extremities: no calf tenderness or pedal edema  Pulses: radial pulses 2+ b/l, decreased pulses lower extremities  Skin: warm and dry  Musculoskeletal: decreased strength left upper and lower extremities, 5/5 strength Right upper and lower extremities                                                                        LABS:                        12.8   4.93  )-----------( 180      ( 2022 07:09 )             38.0         141  |  106  |  18  ----------------------------<  98  3.1<L>   |  29  |  1.22    Ca    8.9      2022 07:09    TPro  6.5  /  Alb  3.0<L>  /  TBili  0.4  /  DBili  x   /  AST  18  /  ALT  18  /  AlkPhos  68        Urinalysis Basic - ( 2022 23:50 )    Color: Yellow / Appearance: Clear / S.025 / pH: x  Gluc: x / Ketone: Trace  / Bili: Negative / Urobili: 4 mg/dL   Blood: x / Protein: 30 mg/dL / Nitrite: Negative   Leuk Esterase: Trace / RBC: 0-2 /HPF / WBC 0-2   Sq Epi: x / Non Sq Epi: Occasional / Bacteria: Few      RADIOLOGY & ADDITIONAL STUDIES  < from: US Duplex Carotid Arteries Complete, Bilateral (22 @ 21:50) >  IMPRESSION:  1.  Right carotid system:  Sonographic findings suggest a 50-69% right   ICA diameter stenosis, noted to be greater than 70% on CT evaluation.  2.  Left carotid system:  Sonographic findings suggest a less than 50%   left ICA diameter stenosis.    < end of copied text >    < from: MR Head No Cont (22 @ 10:32) >  Scattered acute right watershed and MCA territory infarcts. Small acute   infarcts involve the right perirolandic cortex. No hemorrhagic   transformation.      < end of copied text >      Impression and Plan: 71M PMH HTN, HLD, CAD s/p stent in '15 and  (s/p tx with plavix x 1 year), enlarged prostate   admitted with Right watershed/MCA infarcts  seen for 70% right ICA stenosis   as discussed with Dr Cedeno, recommend right carotid endarterectomy with Dr Edmund Loco, endovascular surgeon at  [address], [place] [phone]  patient instructed to Follow up within 2 weeks; no need for urgent transfer at this time  continue medical management, PT/OT, neurology Follow up, ASA 81mg
Cardiology Initial Consult    [facility] - Cardiology at [place]  [address] [place] [address], [place] [address]  Office: [phone]  Fax: [phone]    CHIEF COMPLAINT:    Primary cardiologist: Dr. Gonzalez    HISTORY OF PRESENT ILLNESS:  71M HTN, HLD, CAD s/p stent 2015, 2018 at [facility] who presented with episode of slurred speech, hand weakness, and confusion several days prior to admission, found to have ICA disease. Left, then represented to complete evaluation.    Brain MRI with acute MCA-MILTON territory stroke.    Currently denies any chest pain.  Has some mild GOLDSMITH over the past few months; noted he did gain some weight and has been more sedentary. His anginal symptom was functional-limiting GOLDSMITH, which he currently does not experience.  He also c/o excessive bruising with DAPT.    Has been having frequent palpitations for the past few weeks as well.      Allergies  No Known Allergies    Intolerances    	  MEDICATIONS:  aspirin enteric coated 81 milliGRAM(s) Oral daily  hydrochlorothiazide 25 milliGRAM(s) Oral daily  metoprolol succinate ER 25 milliGRAM(s) Oral daily  tamsulosin 0.4 milliGRAM(s) Oral at bedtime  atorvastatin 40 milliGRAM(s) Oral at bedtime  sodium chloride 0.9%. 1000 milliLiter(s) IV Continuous <Continuous>    PAST MEDICAL & SURGICAL HISTORY:  Hypertension  Hypercholesterolemia  CAD (coronary artery disease)  Stented coronary artery  No significant past surgical history      FAMILY HISTORY:  No pertinent family history in first degree relatives      SOCIAL HISTORY:    [x] Non-smoker  [ ] Smoker  [ ] Alcohol    Review of Systems:  Constitutional: [ -] Fever [- ] Chills [ ] Fatigue [ ] Weight change   HEENT: [ ] Blurred vision [ ] Eye pain [- ] Headache [ ] Runny nose [ ] Sore throat   Respiratory: [ ] Cough [ ] Wheezing [- ] Shortness of breath  Cardiovascular: [- ] Chest Pain [ x] Palpitations [x ] GOLDSMITH [ ] PND [- ] Orthopnea  Gastrointestinal: [ ] Abdominal Pain [ ] Diarrhea [ ] Constipation [ ] Hemorrhoids [ -] Nausea [ ] Vomiting  Genitourinary: [ ] Nocturia [ -] Dysuria [ ] Incontinence  Extremities: [ -] Swelling [ ] Joint Pain  Neurologic: [ ] Focal deficit [ ] Paresthesias [ -] Syncope  Skin: [- ] Rash [ ] Ecchymoses [ ] Wounds [ ] Lesions  Psychiatry: [- ] Depression [ ] Suicidal/Homicidal ideation [ ] Anxiety [ ] Sleep disturbances  [ x] 10 point review of systems is otherwise negative except as mentioned above            [ ]Unable to obtain    PHYSICAL EXAM:  T(C): 36.5 (01-07-22 @ 10:22), Max: 36.7 (01-06-22 @ 23:09)  HR: 68 (01-07-22 @ 10:22) (67 - 74)  BP: 148/69 (01-07-22 @ 10:22) (124/74 - 148/69)  RR: 17 (01-07-22 @ 10:22) (17 - 18)  SpO2: 98% (01-07-22 @ 10:22) (96% - 98%)  Wt(kg): --  I&O's Summary    Appearance: No acute distress  HEENT:   Normal oral mucosa, PERRL  Cardiovascular: Normal S1 S2, no elevated JVP, no murmurs, no edema  Respiratory: Lungs clear to auscultation	, good air movement  Psychiatry: A & O x 3, Mood & affect appropriate  Gastrointestinal:  soft nt  Skin: No rashes, no ecchymoses, no cyanosis	  Vascular: Peripheral pulses palpable bilaterally    LABS:	 	  CBC Full  -  ( 07 Jan 2022 07:09 )  WBC Count : 4.93 K/uL  Hemoglobin : 12.8 g/dL  Hematocrit : 38.0 %  Platelet Count - Automated : 180 K/uL    01-07  141  |  106  |  18  ----------------------------<  98  3.1<L>   |  29  |  1.22    Ca    8.9      07 Jan 2022 07:09    TPro  6.5  /  Alb  3.0<L>  /  TBili  0.4  /  DBili  x   /  AST  18  /  ALT  18  /  AlkPhos  68  01-07      proBNP:   Lipid Profile:   HgA1c:   TSH: Thyroid Stimulating Hormone, Serum: 2.520 uIU/mL (01-07 @ 07:09)      CARDIAC MARKERS:  Troponin I, High Sensitivity Result: 13.0 ng/L (01-06-22 @ 05:43)      TELEMETRY: 	  SR, occasional to frequent PVC's  ECG:  	SR, LVH, non-specific t wave changes  RADIOLOGY:  < from: US Duplex Carotid Arteries Complete, Bilateral (01.06.22 @ 21:50) >  IMPRESSION:  1.  Right carotid system:  Sonographic findings suggest a 50-69% right   ICA diameter stenosis, noted to be greater than 70% on CT evaluation.  2.  Left carotid system:  Sonographic findings suggest a less than 50%   left ICA diameter stenosis.    < end of copied text >  < from: CT Angio Neck w/ IV Cont (01.04.22 @ 18:45) >  IMPRESSIONS:    HEAD CT: Mild volume loss, microvascular disease, no acute hemorrhage or   midline shift.  Chronic lacunar infarct right thalamus.    CTA COW:  Moderate to severe stenosis suspected in the P2 segment of the   right PCA.  Mildly irregular lumen throughout the central basilar artery on source   images. This could not be well reproduced on 3-D vessel analysis.    CTA NECK: Suspected greater than 70% stenosis at the right ICA origin by   NASCET criteria.  Bilateral vertebral arteries are patent without flow limiting stenosis.    < end of copied text >  OTHER: 	    PREVIOUS DIAGNOSTIC TESTING:    [x] Echocardiogram:< from: TTE Echo Complete w/o Contrast w/ Doppler (01.06.22 @ 15:01) >  Summary:   1. Left ventricular ejection fraction, by visual estimation, is 55 to   60%.   2. Normal global left ventricular systolic function.   3. Spectral Doppler shows impaired relaxation pattern of left   ventricular myocardial filling (Grade I diastolic dysfunction).   4. Normal right ventricular size and function.   5. Moderately enlarged left atrium.   6. Structurally normal mitral valve, with normal leaflet excursion.   7. Sclerotic aortic valve with normal opening.   8. Increased relative wall thickness with normal mass index consistent   with left ventricular concentric remodeling.    < end of copied text >    [ ] Catheterization:  [ ] Stress Test:  	
NEUROLOGY CONSULT    HPI:  70 yo man who presented to ER a few days ago for dysarthria and left hand weakness, left AMA to go home and turn his heater off, and returned yesterday.  MRI brain shows an acute ischemic stroke in the right MCA-MILTON watershed zone, and CTA shows 70% stenosis of the right ICA, carotid US estimates a 50-69% stenosis of the right ICA.      PMHx: HTN, HLD  He took Plavix in the past and had excessive bruising, so is on Aspirin monotherapy and statin therapy    Head CT: old lacunar infarcts in right CT, right thalamus, right BG    CTA head/neck: 70% stenosis of right ICA, < 50% stenosis of left ICA  Carotid US: 50-69% stenosis of right ICA, < 50% stenosis of left ICA    TTE: EF 55-60%    NEUROLOGICAL EXAM:  T 97 F  /68  HR 67  MS: Awake, alert, oriented x 4, language fluent, comprehension intact, naming intact, repetition intact, normal affect  CN: PERRLA, EOMI, visual fields intact, facial sensation intact, face symmetric, no dysarthria, symmetric palatal elevation, tongue midline, symmetric shoulder shrug and SCM strength  Motor: normal bulk, normal tone, power 5/5 in all four extremities except for 4+/5 left hand , no tremors or fasciculations  Sensation: impaired LT in left hand  Reflexes: 2+ at biceps, brachioradialis, patella, ankle bilaterally.  Flexor plantar response bilaterally.  No clonus  Coordination: no dysmetria    NIHSS: 1    Assessment:  70 yo man with an acute ischemic stroke in the right MCA-MILTON watershed zone with right ICA stenosis, suggestive of symptomatic right ICA stenosis.    Recommendations:  1. consult Vascular surgery  2. Antiplatelet therapy: did not tolerate dual antiplatelet therapy in the past, continue Aspirin 81mg daily  3. Intensive statin therapy with target LDL < 70  4. Permissive hypertension to optimize cerebral perfusion, up to 160-180 systolic, IV hydration with normal saline to volume expand  5. Stroke education  6. OT eval for hand weakness    Afshin Rodriguez MD  [facility] Department of Neurology  [facility]

## 2022-01-07 NOTE — CONSULT NOTE ADULT - ASSESSMENT
71M HTN, HLD, pre-DM, CAD s/p stent 2015, 2018 at [facility] who presented with episode of slurred speech, hand weakness, and confusion several days prior to admission, found to have R ICA disease and acute R MCA-MILTON territory stroke.    -Ipsilateral carotid disease in pt with significant cardiovascular risk factors  -palpitations are likely PVC's and when off permissive hypertension recommend increasing metoprolol for symptomatic control. lower concern for atrial fibrillation.  -LDL goal < 70, TG goal < 150. discussed with pt that he should inquire about the role for PCSK9i and icosapent ethyl, respectively, for additional risk factor modification as an outpatient in addition to maximally tolerated statin therapy  -without evidence of uncontrolled arrhythmia, decompensated congestive heart failure, or active cardiac ischemia. There is no cardiac contraindication to proceeding with any planned carotid procedures.  
1-8-22I had reviewed the carotid duplex and the CTA and i had discussed with  Dr Moreno white and Dr Afshin Rodriguez, Neurologist. The pt has stable CVA with left side arm wekness,  with right ICA stenosis.   Agree with the above assesment and the pt needs medical mangement for now and needs w/u and eval for Right carotid endartrectomy as out patient in next few weeks and pt is instructed to see Dr boyce at [phone] , pt will make th eappt in next few days, i tried to call the office but the office was closed  and patient wanted to be DC home ASAP. He has indicated that he will make the appt. Also al attending agreed that this did not need emergent TANVIR and there was no need to transfer the patient to higher level of care at this time.

## 2022-01-08 ENCOUNTER — TRANSCRIPTION ENCOUNTER (OUTPATIENT)
Age: 72
End: 2022-01-08

## 2022-01-08 VITALS
DIASTOLIC BLOOD PRESSURE: 80 MMHG | OXYGEN SATURATION: 98 % | TEMPERATURE: 98 F | HEART RATE: 78 BPM | RESPIRATION RATE: 17 BRPM | SYSTOLIC BLOOD PRESSURE: 142 MMHG

## 2022-01-08 LAB
ALBUMIN SERPL ELPH-MCNC: 3.5 G/DL — SIGNIFICANT CHANGE UP (ref 3.3–5)
ALP SERPL-CCNC: 76 U/L — SIGNIFICANT CHANGE UP (ref 40–120)
ALT FLD-CCNC: 20 U/L — SIGNIFICANT CHANGE UP (ref 12–78)
ANION GAP SERPL CALC-SCNC: 4 MMOL/L — LOW (ref 5–17)
AST SERPL-CCNC: 16 U/L — SIGNIFICANT CHANGE UP (ref 15–37)
BASOPHILS # BLD AUTO: 0.02 K/UL — SIGNIFICANT CHANGE UP (ref 0–0.2)
BASOPHILS NFR BLD AUTO: 0.4 % — SIGNIFICANT CHANGE UP (ref 0–2)
BILIRUB SERPL-MCNC: 0.6 MG/DL — SIGNIFICANT CHANGE UP (ref 0.2–1.2)
BUN SERPL-MCNC: 17 MG/DL — SIGNIFICANT CHANGE UP (ref 7–23)
CALCIUM SERPL-MCNC: 9.4 MG/DL — SIGNIFICANT CHANGE UP (ref 8.5–10.1)
CHLORIDE SERPL-SCNC: 110 MMOL/L — HIGH (ref 96–108)
CO2 SERPL-SCNC: 28 MMOL/L — SIGNIFICANT CHANGE UP (ref 22–31)
CREAT SERPL-MCNC: 1.14 MG/DL — SIGNIFICANT CHANGE UP (ref 0.5–1.3)
EOSINOPHIL # BLD AUTO: 0.11 K/UL — SIGNIFICANT CHANGE UP (ref 0–0.5)
EOSINOPHIL NFR BLD AUTO: 2.3 % — SIGNIFICANT CHANGE UP (ref 0–6)
GLUCOSE SERPL-MCNC: 81 MG/DL — SIGNIFICANT CHANGE UP (ref 70–99)
HCT VFR BLD CALC: 40.8 % — SIGNIFICANT CHANGE UP (ref 39–50)
HGB BLD-MCNC: 13.6 G/DL — SIGNIFICANT CHANGE UP (ref 13–17)
IMM GRANULOCYTES NFR BLD AUTO: 0.2 % — SIGNIFICANT CHANGE UP (ref 0–1.5)
LYMPHOCYTES # BLD AUTO: 1.58 K/UL — SIGNIFICANT CHANGE UP (ref 1–3.3)
LYMPHOCYTES # BLD AUTO: 33.1 % — SIGNIFICANT CHANGE UP (ref 13–44)
MCHC RBC-ENTMCNC: 27.8 PG — SIGNIFICANT CHANGE UP (ref 27–34)
MCHC RBC-ENTMCNC: 33.3 GM/DL — SIGNIFICANT CHANGE UP (ref 32–36)
MCV RBC AUTO: 83.4 FL — SIGNIFICANT CHANGE UP (ref 80–100)
MONOCYTES # BLD AUTO: 0.42 K/UL — SIGNIFICANT CHANGE UP (ref 0–0.9)
MONOCYTES NFR BLD AUTO: 8.8 % — SIGNIFICANT CHANGE UP (ref 2–14)
NEUTROPHILS # BLD AUTO: 2.64 K/UL — SIGNIFICANT CHANGE UP (ref 1.8–7.4)
NEUTROPHILS NFR BLD AUTO: 55.2 % — SIGNIFICANT CHANGE UP (ref 43–77)
NRBC # BLD: 0 /100 WBCS — SIGNIFICANT CHANGE UP (ref 0–0)
PLATELET # BLD AUTO: 203 K/UL — SIGNIFICANT CHANGE UP (ref 150–400)
POTASSIUM SERPL-MCNC: 3.8 MMOL/L — SIGNIFICANT CHANGE UP (ref 3.5–5.3)
POTASSIUM SERPL-SCNC: 3.8 MMOL/L — SIGNIFICANT CHANGE UP (ref 3.5–5.3)
PROT SERPL-MCNC: 7.5 GM/DL — SIGNIFICANT CHANGE UP (ref 6–8.3)
RBC # BLD: 4.89 M/UL — SIGNIFICANT CHANGE UP (ref 4.2–5.8)
RBC # FLD: 14.7 % — HIGH (ref 10.3–14.5)
SODIUM SERPL-SCNC: 142 MMOL/L — SIGNIFICANT CHANGE UP (ref 135–145)
WBC # BLD: 4.78 K/UL — SIGNIFICANT CHANGE UP (ref 3.8–10.5)
WBC # FLD AUTO: 4.78 K/UL — SIGNIFICANT CHANGE UP (ref 3.8–10.5)

## 2022-01-08 PROCEDURE — 99239 HOSP IP/OBS DSCHRG MGMT >30: CPT

## 2022-01-08 RX ORDER — CLOPIDOGREL BISULFATE 75 MG/1
1 TABLET, FILM COATED ORAL
Qty: 0 | Refills: 0 | DISCHARGE

## 2022-01-08 RX ORDER — AMLODIPINE BESYLATE 2.5 MG/1
1 TABLET ORAL
Qty: 0 | Refills: 0 | DISCHARGE

## 2022-01-08 RX ORDER — METOPROLOL TARTRATE 50 MG
1 TABLET ORAL
Qty: 0 | Refills: 0 | DISCHARGE

## 2022-01-08 RX ADMIN — Medication 81 MILLIGRAM(S): at 11:05

## 2022-01-08 RX ADMIN — Medication 25 MILLIGRAM(S): at 05:17

## 2022-01-08 NOTE — DISCHARGE NOTE NURSING/CASE MANAGEMENT/SOCIAL WORK - NSDCFUADDAPPT_GEN_ALL_CORE_FT
Follow up with your PMD and listed specialists within 1 week.     Follow with Dr. Loco's vascular team (endovascular surgeon at [address], [place] [phone]) within 1 week to have a carotid endarterectomy. BE SURE TO MAKE THIS APPOINTMENT  AS SOON AS POSSIBLE to prevent worsening of current stroke, further strokes, and disability/death.

## 2022-01-08 NOTE — PHYSICAL THERAPY INITIAL EVALUATION ADULT - PERTINENT HX OF CURRENT PROBLEM, REHAB EVAL
Prior to this admission the patient is completely independent in ADLS, has a regular job. Pt states he drove himself to this facility. Pt is admitted with c/o weakness in LUE and LLE, unsteady gait, slurring of speech, MRI 1/7: Scatttered acute R watershed MCA infarcts

## 2022-01-08 NOTE — DISCHARGE NOTE PROVIDER - HOSPITAL COURSE
FROM ADMISSION H+P:   HPI:  71 y.o Male PMH of CAD presented to the ED after having a episode of slurred speech and confusion a few days prior, he believed that he deanne a lot of alcohol however that was not the cause. Was here yesterday was found to have 70% blockage in the ICA, was going to be admitted however patient wanted to leave to turn off his heater.     Now returns for a work up, namely MRI brain.   (05 Jan 2022 18:49)      ---  HOSPITAL COURSE: 71 y.o Male PMH of CAD presented to the ED after having a episode of slurred speech and confusion a few days prior, he believed that he deanne a lot of alcohol however that was not the cause. Was here  1/4 was found to have 70% blockage in the ICA, was going to be admitted however patient wanted to leave to turn off his heater.     #ICU occlusion  - MRI brain no-contrast ordered to r/o acute CVA -- shows R MCA stenosis involving watershed zones -- d/w neuro, vascular team -- nonurgent surgical intervention warranted -- pt advised to follow closely with Dr. Loco for R CEA as outpatient within 1 week -- will monitor overnight on tele, allow permissive HTN and start ASA 81mg daily (had bruising w/ DAPT in the past)  - CT head notes chronic changes.  CTA notes 70 % right ICA stenosis.  - Carotid dopplers noted. TTE shows Gr1 DD, otherwise grossly WNL, lipid profile noted, Hba1C 6.0, TSH WNL, b12 pending  - neuro/vascular/cardio eval noted    #CAD  - Continue all home meds of Home Norvasc STOPPED, HCTZ STOPPED, ASA continued, Crestor continued, home lopressor continued    #BPH   - c/w Flomax    #DVTppx  - SCDs      ---  CONSULTANTS:   vascular  neurology  cardiology  ---

## 2022-01-08 NOTE — DISCHARGE NOTE NURSING/CASE MANAGEMENT/SOCIAL WORK - PATIENT PORTAL LINK FT
You can access the FollowMyHealth Patient Portal offered by [facility] by registering at the following website: [email or web address]. By joining [employer]’s FollowMyHealth portal, you will also be able to view your health information using other applications (apps) compatible with our system.

## 2022-01-08 NOTE — DISCHARGE NOTE PROVIDER - NSDCCPCAREPLAN_GEN_ALL_CORE_FT
PRINCIPAL DISCHARGE DIAGNOSIS  Diagnosis: Chest pain  Assessment and Plan of Treatment: You came in with palpitations, and report of confusion and slurred speech. You were found to have blockages in vessels going to R side of brain, and evidence of an acute stroke in the distribution of these vessels. You were evaluated by a neurologist, vascular surgery team, and cardiologist and were recommended to continue with aspirin daily, to keep your blood pressures from 160-180 systolic, high dose statin therapy, and to follow with Dr. Loco's vascular team within 1 week to have a carotid endarterectomy. BE SURE TO MAKE THIS APPOINTMENT  AS SOON AS POSSIBLE to prevent worsening of current stroke, further strokes, and disability/death.      SECONDARY DISCHARGE DIAGNOSES  Diagnosis: HTN (hypertension)  Assessment and Plan of Treatment: We want to allow for a higher blood pressure than normal, with goal blood pressures from 160-180 systolic. Your Home Norvasc was STOPPED, HCTZ STOPPED, ASA continued, Crestor continued, home lopressor continued. Be sure to follow closely with your vascular doctor, cardiologist, and neurologist as listed to reconcile your medications as an outpatient.

## 2022-01-08 NOTE — OCCUPATIONAL THERAPY INITIAL EVALUATION ADULT - STRENGTHENING, PT EVAL
Pt will increase BUE/BLE extremity strength to 5/5 to improve functional strength needed to engage in functional tasks by 4 weeks

## 2022-01-08 NOTE — PHYSICAL THERAPY INITIAL EVALUATION ADULT - GENERAL OBSERVATIONS, REHAB EVAL
Pt is alert, oriented x 4, follow directions on room air, slurred speech but understable, coherent, denies pain, heacheaches, strength deficits in the LUE and LLE .

## 2022-01-08 NOTE — DISCHARGE NOTE PROVIDER - PROVIDER TOKENS
PROVIDER:[TOKEN:[[id number]]],PROVIDER:[TOKEN:[[id number]]],PROVIDER:[TOKEN:[[id number]]]

## 2022-01-08 NOTE — DISCHARGE NOTE PROVIDER - CARE PROVIDER_API CALL
Vinicius Loco)  Surgery; Vascular Surgery  [address] [place] [id number]  Phone: [phone]  Fax: [phone]  Follow Up Time:     Andre Matute)  Cardiovascular Disease; Internal Medicine  [address] [place] [address]  Phone: [phone]  Fax: [phone]  Follow Up Time:     Afshin Rodriguez)  Clinical Neurophysiology; Neurology  [address] [facility] [address]  [place] [address]  Phone: [phone]  Fax: [phone]  Follow Up Time:

## 2022-01-08 NOTE — PHYSICAL THERAPY INITIAL EVALUATION ADULT - LIVES WITH, PROFILE
Pt states he lives alone in pvt house, has no steps to use both at the entrance and inside the house.

## 2022-01-08 NOTE — OCCUPATIONAL THERAPY INITIAL EVALUATION ADULT - GENERAL OBSERVATIONS, REHAB EVAL
Pt was encountered sitting at edge of bed; NAD, portable telemetry +, AXOX4, cooperative, followed commands and able to state wants and needs.

## 2022-01-08 NOTE — DISCHARGE NOTE PROVIDER - NSDCFUADDAPPT_GEN_ALL_CORE_FT
Follow up with your PMD and listed specialists within 1 week.     Follow with Dr. Loco's vascular team (endovascular surgeon at [address], [place] [phone]) within 1 week to have a carotid endarterectomy. BE SURE TO MAKE THIS APPOINTMENT  AS SOON AS POSSIBLE to prevent worsening of current stroke, further strokes, and disability/death.

## 2022-01-08 NOTE — PHYSICAL THERAPY INITIAL EVALUATION ADULT - ADDITIONAL COMMENTS
Pt states prior to this admission he has a regular job as a , independent in [place], does not use any walking device.

## 2022-01-08 NOTE — OCCUPATIONAL THERAPY INITIAL EVALUATION ADULT - ADDITIONAL COMMENTS
Home Pt lives alone (No one available to assist when home) in a private house with no steps to enter. Once inside, the pt main bedroom and bathroom is on that floor when entering. The pts bathroom has a tub/shower combination, regular toilet and no grab bars inside. The pt ambulates with no device and does not own any device for ambulation.

## 2022-01-08 NOTE — DISCHARGE NOTE NURSING/CASE MANAGEMENT/SOCIAL WORK - NSDCPEFALRISK_GEN_ALL_CORE
For information on Fall & Injury Prevention, visit: [email or web address] OR  [email or web address] OR  https://www.cdc.gov/steadi/patient.html

## 2022-01-08 NOTE — OCCUPATIONAL THERAPY INITIAL EVALUATION ADULT - PERTINENT HX OF CURRENT PROBLEM, REHAB EVAL
71M HTN, HLD, CAD s/p stent 2015, 2018 at [facility] who presented with episode of slurred speech, hand weakness, and confusion several days prior to admission, found to have ICA disease. Left, then represented to complete evaluation. MRI of the head on 1/7/22 revealed Scattered acute right watershed and MCA territory infarcts. Small acute infarcts involve the right perirolandic cortex.

## 2022-01-08 NOTE — DISCHARGE NOTE PROVIDER - CARE PROVIDERS DIRECT ADDRESSES
,[email or web address]

## 2022-01-08 NOTE — DISCHARGE NOTE PROVIDER - NSDCMRMEDTOKEN_GEN_ALL_CORE_FT
aspirin 81 mg oral tablet: 1 tab(s) orally once a day  rosuvastatin 40 mg oral tablet: 1 tab(s) orally once a day  tamsulosin 0.4 mg oral capsule: 1 cap(s) orally once a day  Toprol-XL 50 mg oral tablet, extended release: 1 tab(s) orally once a day  traZODone 100 mg oral tablet: 1 tab(s) orally once a day

## 2022-01-08 NOTE — DISCHARGE NOTE PROVIDER - ATTENDING DISCHARGE PHYSICAL EXAMINATION:
T(C): 36.1 (01-08-22 @ 05:49), Max: 36.5 (01-07-22 @ 10:22)  HR: 72 (01-08-22 @ 09:27) (62 - 74)  BP: 155/87 (01-08-22 @ 09:27) (143/76 - 160/85)  RR: 18 (01-08-22 @ 05:49) (17 - 18)  SpO2: 98% (01-08-22 @ 09:27) (96% - 98%)    Physical Exam:  General: Well developed, well nourished, NAD  HEENT: NC/AT, EOMI B/L, moist mucous membranes   Neck: Supple, nontender, no masses  CV: RRR, +S1/S2, no murmurs, rubs or gallops  Respiratory: CTA B/L, No W/R/R  Abdominal: Soft, NT, ND +BSx4  Extremities: No C/C/E, + peripheral pulses  Neurology: AAOx3, nonfocal, L hand loss of  strength and loss of sensation in fingertips

## 2022-01-08 NOTE — PHYSICAL THERAPY INITIAL EVALUATION ADULT - STRENGTHENING, PT EVAL
Improve strength in the LUE and LLE to 5/5 and be able to perform functional tasks-bed mobility, sitting, standing, transfers and ambulate in a safe manner with or without  assistive device and prevent falls.

## 2022-01-14 DIAGNOSIS — Z79.82 LONG TERM (CURRENT) USE OF ASPIRIN: ICD-10-CM

## 2022-01-14 DIAGNOSIS — I65.21 OCCLUSION AND STENOSIS OF RIGHT CAROTID ARTERY: ICD-10-CM

## 2022-01-14 DIAGNOSIS — Z79.02 LONG TERM (CURRENT) USE OF ANTITHROMBOTICS/ANTIPLATELETS: ICD-10-CM

## 2022-01-14 DIAGNOSIS — I63.411 CEREBRAL INFARCTION DUE TO EMBOLISM OF RIGHT MIDDLE CEREBRAL ARTERY: ICD-10-CM

## 2022-01-14 DIAGNOSIS — R29.701 NIHSS SCORE 1: ICD-10-CM

## 2022-01-14 DIAGNOSIS — I25.10 ATHEROSCLEROTIC HEART DISEASE OF NATIVE CORONARY ARTERY WITHOUT ANGINA PECTORIS: ICD-10-CM

## 2022-01-14 DIAGNOSIS — I10 ESSENTIAL (PRIMARY) HYPERTENSION: ICD-10-CM

## 2022-01-14 DIAGNOSIS — R07.9 CHEST PAIN, UNSPECIFIED: ICD-10-CM

## 2022-01-14 DIAGNOSIS — N40.0 BENIGN PROSTATIC HYPERPLASIA WITHOUT LOWER URINARY TRACT SYMPTOMS: ICD-10-CM

## 2022-01-14 DIAGNOSIS — E78.00 PURE HYPERCHOLESTEROLEMIA, UNSPECIFIED: ICD-10-CM

## 2022-01-14 DIAGNOSIS — Z95.5 PRESENCE OF CORONARY ANGIOPLASTY IMPLANT AND GRAFT: ICD-10-CM

## 2022-01-14 DIAGNOSIS — I69.334 MONOPLEGIA OF UPPER LIMB FOLLOWING CEREBRAL INFARCTION AFFECTING LEFT NON-DOMINANT SIDE: ICD-10-CM

## 2022-01-21 ENCOUNTER — APPOINTMENT (OUTPATIENT)
Dept: VASCULAR SURGERY | Facility: CLINIC | Age: 72
End: 2022-01-21
Payer: MEDICARE

## 2022-01-21 VITALS
HEART RATE: 74 BPM | SYSTOLIC BLOOD PRESSURE: 170 MMHG | WEIGHT: 180 LBS | DIASTOLIC BLOOD PRESSURE: 84 MMHG | TEMPERATURE: 95.6 F | BODY MASS INDEX: 28.93 KG/M2 | HEIGHT: 66 IN

## 2022-01-21 VITALS — DIASTOLIC BLOOD PRESSURE: 88 MMHG | SYSTOLIC BLOOD PRESSURE: 184 MMHG | HEART RATE: 79 BPM

## 2022-01-21 DIAGNOSIS — R73.03 PREDIABETES.: ICD-10-CM

## 2022-01-21 DIAGNOSIS — Z86.79 PERSONAL HISTORY OF OTHER DISEASES OF THE CIRCULATORY SYSTEM: ICD-10-CM

## 2022-01-21 DIAGNOSIS — Z78.9 OTHER SPECIFIED HEALTH STATUS: ICD-10-CM

## 2022-01-21 DIAGNOSIS — Z86.39 PERSONAL HISTORY OF OTHER ENDOCRINE, NUTRITIONAL AND METABOLIC DISEASE: ICD-10-CM

## 2022-01-21 DIAGNOSIS — E78.1 PURE HYPERGLYCERIDEMIA: ICD-10-CM

## 2022-01-21 DIAGNOSIS — Z80.42 FAMILY HISTORY OF MALIGNANT NEOPLASM OF PROSTATE: ICD-10-CM

## 2022-01-21 DIAGNOSIS — Z87.891 PERSONAL HISTORY OF NICOTINE DEPENDENCE: ICD-10-CM

## 2022-01-21 PROCEDURE — 99204 OFFICE O/P NEW MOD 45 MIN: CPT

## 2022-01-24 PROBLEM — Z86.79 HISTORY OF HYPERTENSION: Status: RESOLVED | Noted: 2022-01-21 | Resolved: 2022-01-24

## 2022-01-24 PROBLEM — Z78.9 DRINKS WINE: Status: ACTIVE | Noted: 2022-01-21

## 2022-01-24 PROBLEM — R73.03 BORDERLINE DIABETES: Status: RESOLVED | Noted: 2022-01-21 | Resolved: 2022-01-24

## 2022-01-24 PROBLEM — Z80.42 FAMILY HISTORY OF MALIGNANT NEOPLASM OF PROSTATE: Status: ACTIVE | Noted: 2022-01-21

## 2022-01-24 PROBLEM — Z86.39 HISTORY OF HIGH CHOLESTEROL: Status: RESOLVED | Noted: 2022-01-21 | Resolved: 2022-01-24

## 2022-01-24 PROBLEM — Z87.891 FORMER SMOKER: Status: ACTIVE | Noted: 2022-01-21

## 2022-01-24 PROBLEM — E78.1 HIGH TRIGLYCERIDES: Status: RESOLVED | Noted: 2022-01-21 | Resolved: 2022-01-24

## 2022-01-24 RX ORDER — HYDROCHLOROTHIAZIDE 12.5 MG/1
CAPSULE ORAL
Refills: 0 | Status: ACTIVE | COMMUNITY

## 2022-01-24 RX ORDER — ROSUVASTATIN CALCIUM 5 MG/1
TABLET, FILM COATED ORAL
Refills: 0 | Status: ACTIVE | COMMUNITY

## 2022-01-24 RX ORDER — ASPIRIN 81 MG
81 TABLET, DELAYED RELEASE (ENTERIC COATED) ORAL
Refills: 0 | Status: ACTIVE | COMMUNITY

## 2022-01-24 RX ORDER — AMLODIPINE BESYLATE 5 MG/1
TABLET ORAL
Refills: 0 | Status: ACTIVE | COMMUNITY

## 2022-01-24 RX ORDER — TAMSULOSIN HYDROCHLORIDE 0.4 MG/1
CAPSULE ORAL
Refills: 0 | Status: ACTIVE | COMMUNITY

## 2022-01-26 ENCOUNTER — OUTPATIENT (OUTPATIENT)
Dept: OUTPATIENT SERVICES | Facility: HOSPITAL | Age: 72
LOS: 1 days | End: 2022-01-26

## 2022-01-26 VITALS
OXYGEN SATURATION: 98 % | SYSTOLIC BLOOD PRESSURE: 140 MMHG | RESPIRATION RATE: 18 BRPM | TEMPERATURE: 98 F | WEIGHT: 182.1 LBS | DIASTOLIC BLOOD PRESSURE: 90 MMHG | HEIGHT: 64.5 IN | HEART RATE: 70 BPM

## 2022-01-26 DIAGNOSIS — I65.23 OCCLUSION AND STENOSIS OF BILATERAL CAROTID ARTERIES: ICD-10-CM

## 2022-01-26 DIAGNOSIS — Z86.79 PERSONAL HISTORY OF OTHER DISEASES OF THE CIRCULATORY SYSTEM: ICD-10-CM

## 2022-01-26 DIAGNOSIS — N40.0 BENIGN PROSTATIC HYPERPLASIA WITHOUT LOWER URINARY TRACT SYMPTOMS: ICD-10-CM

## 2022-01-26 DIAGNOSIS — I10 ESSENTIAL (PRIMARY) HYPERTENSION: ICD-10-CM

## 2022-01-26 DIAGNOSIS — I25.10 ATHEROSCLEROTIC HEART DISEASE OF NATIVE CORONARY ARTERY WITHOUT ANGINA PECTORIS: ICD-10-CM

## 2022-01-26 DIAGNOSIS — Z98.890 OTHER SPECIFIED POSTPROCEDURAL STATES: Chronic | ICD-10-CM

## 2022-01-26 LAB
BLD GP AB SCN SERPL QL: NEGATIVE — SIGNIFICANT CHANGE UP
RH IG SCN BLD-IMP: POSITIVE — SIGNIFICANT CHANGE UP

## 2022-01-26 RX ORDER — METOPROLOL TARTRATE 50 MG
1 TABLET ORAL
Qty: 0 | Refills: 0 | DISCHARGE

## 2022-01-26 RX ORDER — ASPIRIN/CALCIUM CARB/MAGNESIUM 324 MG
1 TABLET ORAL
Qty: 0 | Refills: 0 | DISCHARGE

## 2022-01-26 RX ORDER — ROSUVASTATIN CALCIUM 5 MG/1
1 TABLET ORAL
Qty: 0 | Refills: 0 | DISCHARGE

## 2022-01-26 RX ORDER — TRAZODONE HCL 50 MG
1 TABLET ORAL
Qty: 0 | Refills: 0 | DISCHARGE

## 2022-01-26 RX ORDER — SODIUM CHLORIDE 9 MG/ML
1000 INJECTION, SOLUTION INTRAVENOUS
Refills: 0 | Status: DISCONTINUED | OUTPATIENT
Start: 2022-02-08 | End: 2022-02-09

## 2022-01-26 RX ORDER — TAMSULOSIN HYDROCHLORIDE 0.4 MG/1
1 CAPSULE ORAL
Qty: 0 | Refills: 0 | DISCHARGE

## 2022-01-26 NOTE — H&P PST ADULT - HISTORY OF PRESENT ILLNESS
70 yo male presents to PST unit with pre-op diagnosis of occlusion & stenosis bilateral carotid arteries scheduled for right transcarotid artery revascularization with Dr. Loco.

## 2022-01-26 NOTE — H&P PST ADULT - PROBLEM SELECTOR PLAN 1
scheduled for right transcarotid artery revascularization with Dr. Loco on 02/08/2022.  Verbal and written pre-op instructions provided to patient. Patient verbalized understanding and will call surgeons office for revised instructions if surgery is rescheduled.   Pepcid for GI prophylaxis provided.   patient given verbal and written instruction with teach back on chlorhexidine shampoo, and the patient verbalized understanding with return demonstration.   Patient aware of need for COVID testing prior to  procedure at a [facility]  and advised to coordinate with surgeon to get tested within 72 hours of procedure.  Pt. states he was recently prescribed Plavix by surgeon and told to continue on Plavix & ASA for surgery.

## 2022-01-26 NOTE — H&P PST ADULT - RESPIRATORY RATE (BREATHS/MIN)
Valerie Mackey is a 22 y.o. female 35w0d        OB History    Para Term  AB Living   1             SAB TAB Ectopic Molar Multiple Live Births                    # Outcome Date GA Lbr John/2nd Weight Sex Delivery Anes PTL Lv   1 Current                Vitals  BP: 122/76  Weight: 260 lb (117.9 kg)  Pulse: 90  Patient Position: Sitting  Albumin: Negative  Glucose: Negative      The patient was seen and evaluated. There was positive fetal movements. No contractions or leakage of fluid. Signs and symptoms of  labor as well as labor were reviewed. The S/S of Pre-Eclampsia were reviewed with the patient in detail. She is to report any of these if they occur. She currently denies any of these. The patient had her 28 week labs completed. Hospital Outpatient Visit on 2021   Component Date Value Ref Range Status    Coxsackie A9 Ab 2021 1:8  <1:8 Final    Comment: (NOTE)  INTERPRETIVE INFORMATION: Coxsackie A Serotype 9 Titer  Single positive antibody titers of greater than 1:32 may indicate   past or current infection. Seroconversion or an increase in titers   between acute and convalescent sera of at least fourfold is   considered strong evidence of current or recent infection. Performed By: Moe Mcmullen [address]  [place], [address] [facility]  : Marko Styles. Constanza Villalta MD      Coxsackie B1 Ab 2021 <1:10  <1:10 Final    Coxsackie B2 Ab 2021 1:40  <1:10 Final    Coxsackie B3 Ab 2021 1:160  <1:10 Final    Coxsackie B4 Ab 2021 <1:10  <1:10 Final    Coxsackie B5 Ab 2021 1:10  <1:10 Final    Coxsackie B6 Ab 2021 <1:10  <1:10 Final    Comment: (NOTE)  INTERPRETIVE INFORMATION: Coxsackie B Virus  Single positive antibody titers of greater than or equal  to 1:80 may indicate past or current infection.  Sero-  conversion or an increase in titers between acute and  convalescent sera of at least fourfold is considered  strong evidence of current or recent infection. Performed By: Maria Guadalupe Mcmullen [address]  [place], [address] [facility] [place] [facility]  : Mathieu Amezcua. Karla Marina MD      Parvovirus B19 IgG 04/26/2021 4.72* <=0.90 IV Final    Comment: (NOTE)  INTERPRETIVE INFORMATION: Parvovirus B19 Antibody, IgG   0.90 IV or less . ......... Negative - No significant                              level of detectable Parvovirus                              B19 IgG antibody. 0.91 - 1.09 IV . .......... Equivocal - Repeat testing in                              7-21 days may be helpful. 1.10 IV or greater . ...... Positive - IgG antibody to                              Parvovirus B19 detected which                              may indicate a current or                              past infection. The best evidence for current infection is a significant change on   two appropriately timed specimens, where both tests are done in   the same laboratory at the same time.  Parvovirus B19 IgM 04/26/2021 0.24  <=0.90 IV Final    Comment: (NOTE)  INTERPRETIVE INFORMATION: Parvovirus B19 Antibody, IgM   0.90 IV or less . ......... Negative - No significant                              level of detectable Parvovirus                              B19 IgM antibody. 0.91 - 1.09 IV . .......... Equivocal - Repeat testing in                              7-21 days may be helpful. 1.10 IV or greater . ....... Positive - IgM antibody to                              Parvovirus B19 detected which                              may indicate a current or                              recent infection. However, low                              levels of IgM antibodies may                              occasionally persist for more                              than 12 months post-infection.   The best evidence for current infection is a significant change on   two appropriately timed specimens, where both tests are done in   the same laboratory at the same time.  Appearance of an IgM antibody response normally occurs 7 to 14   days after th                           e onset of disease. Testing immediately post-exposure   is of no value without a later convalescent specimen. A residual   IgM response may be distinguished from early IgM response to   infection by testing sera from patients three to four weeks later   for changing levels of specific IgM antibodies. Performed By: Jannette Mcmullen [address]  [place], [address] [facility]  : Connie Haney. Salas Maharaj [address] Test Name 04/26/2021 ROSMERY   Final    Send Out Report 04/26/2021 FORWARD TO Dasha Donald 04/26/2021   Final    CMV IgG 04/26/2021 6.4* <0.9 Final    Comment:    Reference Range:  <0.9       Non Reactive  0.9 to 1.0 Indeterminate  >1.0       Reactive     The absence of CMV antibodies suggests that the patient has not been exposed to the virus   and is susceptible to primary infection. The presence of CMV IgG antibodies is indicative of previous exposure to the virus, but   cannot distinguish between active or past infection. Patients suspected of having primary or active infection should be tested for the concurrent   presence of CMV IgM antibodies and/or retested for seroconversion in 3 to 4 weeks. These results are not intended to replace virus isolation and should be interpreted within   the context of clinical and other findings.  CMV IgM 04/26/2021 0.4  <0.9 Final    Comment:    Reference Range:  <0.9       Non Reactive  0.9 to 1.0 Indeterminate  >1.0       Reactive     The absence of CMV antibodies suggests that the patient has not been exposed to the virus   and is susceptible to primary infection. The presence of CMV IgM antibodies is indicative of recent exposure to the virus. These results are not intended to replace virus isolation and should be interpreted within   the context of clinical and other findings.       Toxoplasma IgG 04/26/2021 <0.5  IU/mL Final  Lipoma of Vulva/Right Labia 2020     Priority: High     GYN ONC Referral      GBS (group B Streptococcus carrier), +RV culture, currently pregnant 2020     Priority: High     2020 + GBS- treat in labor per protocol      Rh negative state in antepartum period 2020     Priority: High     Needs rhogam at 28 weeks      ANA (amniotic fluid index) decreased 2021     Follow up in 1 week      ASA 81 mg/dy per [facility] 2020        Diagnosis Orders   1. 35 weeks gestation of pregnancy     2. ANA (amniotic fluid index) decreased     3. Marginal insertion of umbilical cord affecting management of mother     4. Lipoma of skin and subcutaneous tissue     5. Abnormal laboratory test     6. GBS (group B Streptococcus carrier), +RV culture, currently pregnant     7. Rh negative state in antepartum period     8. Microcephaly of fetus, single or unspecified fetus               Plan:  The patient will return to the office for her next visit in 1 weeks. See antepartum flow sheet. [facility] 2021 consult/NST/BPP     Testing Indicated: Yes  Scheduled with Nursing-Pt notified: Yes      Patient was seen with total face to face time of 20 minutes. More than 50% of this visit was on counseling and education regarding her    Diagnosis Orders   1. 35 weeks gestation of pregnancy     2. ANA (amniotic fluid index) decreased     3. Marginal insertion of umbilical cord affecting management of mother     4. Lipoma of skin and subcutaneous tissue     5. Abnormal laboratory test     6. GBS (group B Streptococcus carrier), +RV culture, currently pregnant     7. Rh negative state in antepartum period     8. Microcephaly of fetus, single or unspecified fetus      and her options. She was also counseled on her preventative health maintenance recommendations and follow-up. 18

## 2022-01-26 NOTE — H&P PST ADULT - PROBLEM SELECTOR PLAN 4
Pt. states he had a repeat Echo & was told he  has heart failure- pending cardiac eval- PST NP requesting copy.

## 2022-01-26 NOTE — H&P PST ADULT - NEGATIVE NEUROLOGICAL SYMPTOMS
no transient paralysis/no weakness/no paresthesias/no generalized seizures/no focal seizures/no difficulty walking/no headache/no hemiparesis/no confusion

## 2022-01-26 NOTE — H&P PST ADULT - NSICDXPASTMEDICALHX_GEN_ALL_CORE_FT
PAST MEDICAL HISTORY:  CAD (coronary artery disease)     CVA (cerebral vascular accident) 12/25/2021    Hypercholesterolemia     Hypertension     Stented coronary artery

## 2022-02-07 ENCOUNTER — TRANSCRIPTION ENCOUNTER (OUTPATIENT)
Age: 72
End: 2022-02-07

## 2022-02-07 NOTE — ASU PATIENT PROFILE, ADULT - FALL HARM RISK - UNIVERSAL INTERVENTIONS
Bed in lowest position, wheels locked, appropriate side rails in place/Call bell, personal items and telephone in reach/Instruct patient to call for assistance before getting out of bed or chair/Non-slip footwear when patient is out of bed/[place] to call system/Physically safe environment - no spills, clutter or unnecessary equipment/Purposeful Proactive Rounding/Room/bathroom lighting operational, light cord in reach

## 2022-02-08 ENCOUNTER — APPOINTMENT (OUTPATIENT)
Dept: VASCULAR SURGERY | Facility: HOSPITAL | Age: 72
End: 2022-02-08

## 2022-02-08 ENCOUNTER — INPATIENT (INPATIENT)
Facility: HOSPITAL | Age: 72
LOS: 0 days | Discharge: HOME | End: 2022-02-09
Attending: SURGERY | Admitting: SURGERY
Payer: MEDICARE

## 2022-02-08 VITALS
HEIGHT: 64.5 IN | DIASTOLIC BLOOD PRESSURE: 76 MMHG | OXYGEN SATURATION: 96 % | RESPIRATION RATE: 16 BRPM | WEIGHT: 182.1 LBS | TEMPERATURE: 98 F | HEART RATE: 74 BPM | SYSTOLIC BLOOD PRESSURE: 143 MMHG

## 2022-02-08 DIAGNOSIS — I65.23 OCCLUSION AND STENOSIS OF BILATERAL CAROTID ARTERIES: ICD-10-CM

## 2022-02-08 DIAGNOSIS — Z98.890 OTHER SPECIFIED POSTPROCEDURAL STATES: Chronic | ICD-10-CM

## 2022-02-08 LAB — RH IG SCN BLD-IMP: POSITIVE — SIGNIFICANT CHANGE UP

## 2022-02-08 PROCEDURE — 76937 US GUIDE VASCULAR ACCESS: CPT | Mod: 26

## 2022-02-08 PROCEDURE — 37215 TRANSCATH STENT CCA W/EPS: CPT | Mod: RT

## 2022-02-08 DEVICE — STENT TRANSCAROTID ENROUTE 10X40MM: Type: IMPLANTABLE DEVICE | Site: RIGHT | Status: FUNCTIONAL

## 2022-02-08 DEVICE — IMPLANTABLE DEVICE: Type: IMPLANTABLE DEVICE | Site: RIGHT | Status: FUNCTIONAL

## 2022-02-08 DEVICE — SURGIFOAM PAD 8CM X 12.5CM X 2MM (100C): Type: IMPLANTABLE DEVICE | Site: RIGHT | Status: FUNCTIONAL

## 2022-02-08 DEVICE — KIT INTRODUCER MICRO 21GAX7CM 7FR: Type: IMPLANTABLE DEVICE | Site: RIGHT | Status: FUNCTIONAL

## 2022-02-08 DEVICE — GWIRE ENROUTE 0.014X95CM: Type: IMPLANTABLE DEVICE | Site: RIGHT | Status: FUNCTIONAL

## 2022-02-08 DEVICE — KIT A-LINE 1LUM 20G X 12CM SAFE KIT: Type: IMPLANTABLE DEVICE | Site: RIGHT | Status: FUNCTIONAL

## 2022-02-08 DEVICE — SYS TRANSCAROTID NEUROPROTECTION: Type: IMPLANTABLE DEVICE | Site: RIGHT | Status: FUNCTIONAL

## 2022-02-08 DEVICE — GWIRE VASC ENTRY MINISTICK MAX 4FRX10CM: Type: IMPLANTABLE DEVICE | Site: RIGHT | Status: FUNCTIONAL

## 2022-02-08 DEVICE — BLLN STERLING MONORAIL 5X30MMX135CM: Type: IMPLANTABLE DEVICE | Site: RIGHT | Status: FUNCTIONAL

## 2022-02-08 RX ORDER — FENTANYL CITRATE 50 UG/ML
50 INJECTION INTRAVENOUS
Refills: 0 | Status: DISCONTINUED | OUTPATIENT
Start: 2022-02-08 | End: 2022-02-08

## 2022-02-08 RX ORDER — HEPARIN SODIUM 5000 [USP'U]/ML
5000 INJECTION INTRAVENOUS; SUBCUTANEOUS EVERY 8 HOURS
Refills: 0 | Status: DISCONTINUED | OUTPATIENT
Start: 2022-02-08 | End: 2022-02-09

## 2022-02-08 RX ORDER — HYDROMORPHONE HYDROCHLORIDE 2 MG/ML
0.5 INJECTION INTRAMUSCULAR; INTRAVENOUS; SUBCUTANEOUS
Refills: 0 | Status: DISCONTINUED | OUTPATIENT
Start: 2022-02-08 | End: 2022-02-08

## 2022-02-08 RX ORDER — FENTANYL CITRATE 50 UG/ML
25 INJECTION INTRAVENOUS
Refills: 0 | Status: DISCONTINUED | OUTPATIENT
Start: 2022-02-08 | End: 2022-02-08

## 2022-02-08 RX ORDER — ATORVASTATIN CALCIUM 80 MG/1
80 TABLET, FILM COATED ORAL AT BEDTIME
Refills: 0 | Status: DISCONTINUED | OUTPATIENT
Start: 2022-02-08 | End: 2022-02-09

## 2022-02-08 RX ORDER — SODIUM CHLORIDE 9 MG/ML
1000 INJECTION INTRAMUSCULAR; INTRAVENOUS; SUBCUTANEOUS
Refills: 0 | Status: DISCONTINUED | OUTPATIENT
Start: 2022-02-08 | End: 2022-02-09

## 2022-02-08 RX ORDER — INFLUENZA VIRUS VACCINE 15; 15; 15; 15 UG/.5ML; UG/.5ML; UG/.5ML; UG/.5ML
0.7 SUSPENSION INTRAMUSCULAR ONCE
Refills: 0 | Status: DISCONTINUED | OUTPATIENT
Start: 2022-02-08 | End: 2022-02-09

## 2022-02-08 RX ORDER — CLOPIDOGREL BISULFATE 75 MG/1
75 TABLET, FILM COATED ORAL DAILY
Refills: 0 | Status: DISCONTINUED | OUTPATIENT
Start: 2022-02-09 | End: 2022-02-09

## 2022-02-08 RX ORDER — LOSARTAN POTASSIUM 100 MG/1
50 TABLET, FILM COATED ORAL DAILY
Refills: 0 | Status: DISCONTINUED | OUTPATIENT
Start: 2022-02-08 | End: 2022-02-09

## 2022-02-08 RX ORDER — AMLODIPINE BESYLATE 2.5 MG/1
10 TABLET ORAL DAILY
Refills: 0 | Status: DISCONTINUED | OUTPATIENT
Start: 2022-02-08 | End: 2022-02-09

## 2022-02-08 RX ORDER — ONDANSETRON 8 MG/1
4 TABLET, FILM COATED ORAL ONCE
Refills: 0 | Status: DISCONTINUED | OUTPATIENT
Start: 2022-02-08 | End: 2022-02-08

## 2022-02-08 RX ORDER — METOPROLOL TARTRATE 50 MG
50 TABLET ORAL DAILY
Refills: 0 | Status: DISCONTINUED | OUTPATIENT
Start: 2022-02-08 | End: 2022-02-09

## 2022-02-08 RX ORDER — TAMSULOSIN HYDROCHLORIDE 0.4 MG/1
0.4 CAPSULE ORAL AT BEDTIME
Refills: 0 | Status: DISCONTINUED | OUTPATIENT
Start: 2022-02-08 | End: 2022-02-09

## 2022-02-08 RX ORDER — ASPIRIN/CALCIUM CARB/MAGNESIUM 324 MG
81 TABLET ORAL DAILY
Refills: 0 | Status: DISCONTINUED | OUTPATIENT
Start: 2022-02-09 | End: 2022-02-09

## 2022-02-08 RX ADMIN — SODIUM CHLORIDE 100 MILLILITER(S): 9 INJECTION INTRAMUSCULAR; INTRAVENOUS; SUBCUTANEOUS at 12:38

## 2022-02-08 RX ADMIN — FENTANYL CITRATE 50 MICROGRAM(S): 50 INJECTION INTRAVENOUS at 13:30

## 2022-02-08 RX ADMIN — LOSARTAN POTASSIUM 50 MILLIGRAM(S): 100 TABLET, FILM COATED ORAL at 21:23

## 2022-02-08 RX ADMIN — FENTANYL CITRATE 50 MICROGRAM(S): 50 INJECTION INTRAVENOUS at 13:13

## 2022-02-08 RX ADMIN — TAMSULOSIN HYDROCHLORIDE 0.4 MILLIGRAM(S): 0.4 CAPSULE ORAL at 21:24

## 2022-02-08 RX ADMIN — FENTANYL CITRATE 50 MICROGRAM(S): 50 INJECTION INTRAVENOUS at 16:45

## 2022-02-08 RX ADMIN — ATORVASTATIN CALCIUM 80 MILLIGRAM(S): 80 TABLET, FILM COATED ORAL at 21:38

## 2022-02-08 RX ADMIN — HEPARIN SODIUM 5000 UNIT(S): 5000 INJECTION INTRAVENOUS; SUBCUTANEOUS at 21:23

## 2022-02-08 RX ADMIN — Medication 50 MILLIGRAM(S): at 19:12

## 2022-02-08 RX ADMIN — SODIUM CHLORIDE 30 MILLILITER(S): 9 INJECTION, SOLUTION INTRAVENOUS at 21:38

## 2022-02-08 RX ADMIN — HEPARIN SODIUM 5000 UNIT(S): 5000 INJECTION INTRAVENOUS; SUBCUTANEOUS at 13:50

## 2022-02-08 RX ADMIN — FENTANYL CITRATE 50 MICROGRAM(S): 50 INJECTION INTRAVENOUS at 17:00

## 2022-02-08 RX ADMIN — AMLODIPINE BESYLATE 10 MILLIGRAM(S): 2.5 TABLET ORAL at 19:12

## 2022-02-08 NOTE — CHART NOTE - NSCHARTNOTEFT_GEN_A_CORE
Vascular Surgery  Post Operative Check Note  Patient: MARY JO WILSON 71y (1950) Male   MRN: [id number]  Location: [facility] 05  Visit: 02-08-22 Inpatient  Date: 02-08-22 @ 19:01    Procedure: S/P Right TCAR    Subjective: Patient seen and examined at bedside in PACU. Alert and oriented. Appropriately complaining of pain around incisional site. No nausea/vomiting. No difficulty breathing. No issues swallowing. Able to speak at baseline. Green replaced in PACU due to retention.      Objective:  Vitals: T(F): 98.2 (02-08-22 @ 10:45), Max: 98.2 (02-08-22 @ 07:49)  HR: 65 (02-08-22 @ 17:45)  BP: 141/64 (02-08-22 @ 17:45) (117/57 - 174/68)  RR: 16 (02-08-22 @ 17:45)  SpO2: 97% (02-08-22 @ 17:45)  Vent Settings:     In:   02-08-22 @ 07:01  -  02-08-22 @ 19:01  --------------------------------------------------------  IN: 700 mL      IV Fluids: lactated ringers. 1000 milliLiter(s) (30 mL/Hr) IV Continuous <Continuous>  sodium chloride 0.9%. 1000 milliLiter(s) (100 mL/Hr) IV Continuous <Continuous>      Out:   02-08-22 @ 07:01  -  02-08-22 @ 19:01  --------------------------------------------------------  OUT: 850 mL      EBL:     Voided Urine:   02-08-22 @ 07:01  -  02-08-22 @ 19:01  --------------------------------------------------------  OUT: 850 mL      Green Catheter: yes  Drains: no  MELLISA: no  Chest Tube: no  NG Tube: no        Physical Examination:  Gen: NAD, resting in bed  HEENT: R neck incision is c/d/i with overlying skin glue, no drainage or dehiscence, appropriately tender around incisional site  CV: Regular rate  Resp: Nonlabored breathing on room air  Groin access site: c/d/i with no palpable collections       Imaging:  No post-op imaging studies    Assessment:  71yMale patient S/P R TCAR for carotid stenosis.    Plan:  - BP control, maintain SBP between 110-160  - Pain control  - Diet: DASH  - Continue ASA and plavix, statin  - Continue home meds as ordered  - DVT ppx: SQH  - Dispo: floor      Date/Time: 02-08-22 @ 19:01  Vascular (C Team) Surgery  [id number]

## 2022-02-08 NOTE — PATIENT PROFILE ADULT - FUNCTIONAL ASSESSMENT - DAILY ACTIVITY 1.
4 = No assist / stand by assistance
38 y/o female presents to the ED c/o toothache "for a while". Pt was seen by dentist yesterday and dx with a tooth infection and placed on z pack and Motrin but pain is not improving and now with ear pain and headache as well. Pt had a fever last night but improved this morning. Took Tylenol as well and 1 dose of Oxycodone with mild improvement. Allergic to Penicillin.

## 2022-02-08 NOTE — PATIENT PROFILE ADULT - FALL HARM RISK - RISK INTERVENTIONS
Assistance OOB with selected safe patient handling equipment/Assistance with ambulation/Communicate Fall Risk and Risk Factors to all staff, patient, and family/Monitor gait and stability/Reinforce activity limits and safety measures with patient and family/Sit up slowly, dangle for a short time, stand at bedside before walking/Use of alarms - bed, chair and/or voice tab/Visual Cue: Yellow wristband/Bed in lowest position, wheels locked, appropriate side rails in place/Call bell, personal items and telephone in reach/Instruct patient to call for assistance before getting out of bed or chair/Non-slip footwear when patient is out of bed/[place] to call system/Physically safe environment - no spills, clutter or unnecessary equipment/Purposeful Proactive Rounding/Room/bathroom lighting operational, light cord in reach

## 2022-02-09 ENCOUNTER — TRANSCRIPTION ENCOUNTER (OUTPATIENT)
Age: 72
End: 2022-02-09

## 2022-02-09 VITALS
DIASTOLIC BLOOD PRESSURE: 60 MMHG | SYSTOLIC BLOOD PRESSURE: 132 MMHG | RESPIRATION RATE: 18 BRPM | TEMPERATURE: 98 F | HEART RATE: 72 BPM | OXYGEN SATURATION: 99 %

## 2022-02-09 LAB
ANION GAP SERPL CALC-SCNC: 11 MMOL/L — SIGNIFICANT CHANGE UP (ref 7–14)
BUN SERPL-MCNC: 17 MG/DL — SIGNIFICANT CHANGE UP (ref 7–23)
CALCIUM SERPL-MCNC: 8.9 MG/DL — SIGNIFICANT CHANGE UP (ref 8.4–10.5)
CHLORIDE SERPL-SCNC: 104 MMOL/L — SIGNIFICANT CHANGE UP (ref 98–107)
CO2 SERPL-SCNC: 26 MMOL/L — SIGNIFICANT CHANGE UP (ref 22–31)
CREAT SERPL-MCNC: 1.28 MG/DL — SIGNIFICANT CHANGE UP (ref 0.5–1.3)
GLUCOSE SERPL-MCNC: 90 MG/DL — SIGNIFICANT CHANGE UP (ref 70–99)
HCT VFR BLD CALC: 33.2 % — LOW (ref 39–50)
HGB BLD-MCNC: 11.3 G/DL — LOW (ref 13–17)
MCHC RBC-ENTMCNC: 28.8 PG — SIGNIFICANT CHANGE UP (ref 27–34)
MCHC RBC-ENTMCNC: 34 GM/DL — SIGNIFICANT CHANGE UP (ref 32–36)
MCV RBC AUTO: 84.7 FL — SIGNIFICANT CHANGE UP (ref 80–100)
NRBC # BLD: 0 /100 WBCS — SIGNIFICANT CHANGE UP
NRBC # FLD: 0 K/UL — SIGNIFICANT CHANGE UP
PLATELET # BLD AUTO: 155 K/UL — SIGNIFICANT CHANGE UP (ref 150–400)
POTASSIUM SERPL-MCNC: 3.4 MMOL/L — LOW (ref 3.5–5.3)
POTASSIUM SERPL-SCNC: 3.4 MMOL/L — LOW (ref 3.5–5.3)
RBC # BLD: 3.92 M/UL — LOW (ref 4.2–5.8)
RBC # FLD: 14.6 % — HIGH (ref 10.3–14.5)
SODIUM SERPL-SCNC: 141 MMOL/L — SIGNIFICANT CHANGE UP (ref 135–145)
WBC # BLD: 7.73 K/UL — SIGNIFICANT CHANGE UP (ref 3.8–10.5)
WBC # FLD AUTO: 7.73 K/UL — SIGNIFICANT CHANGE UP (ref 3.8–10.5)

## 2022-02-09 RX ORDER — LANOLIN ALCOHOL/MO/W.PET/CERES
6 CREAM (GRAM) TOPICAL AT BEDTIME
Refills: 0 | Status: DISCONTINUED | OUTPATIENT
Start: 2022-02-09 | End: 2022-02-09

## 2022-02-09 RX ORDER — ACETAMINOPHEN 500 MG
650 TABLET ORAL ONCE
Refills: 0 | Status: COMPLETED | OUTPATIENT
Start: 2022-02-09 | End: 2022-02-09

## 2022-02-09 RX ADMIN — HEPARIN SODIUM 5000 UNIT(S): 5000 INJECTION INTRAVENOUS; SUBCUTANEOUS at 13:22

## 2022-02-09 RX ADMIN — Medication 50 MILLIGRAM(S): at 06:06

## 2022-02-09 RX ADMIN — Medication 81 MILLIGRAM(S): at 11:32

## 2022-02-09 RX ADMIN — AMLODIPINE BESYLATE 10 MILLIGRAM(S): 2.5 TABLET ORAL at 06:05

## 2022-02-09 RX ADMIN — LOSARTAN POTASSIUM 50 MILLIGRAM(S): 100 TABLET, FILM COATED ORAL at 06:06

## 2022-02-09 RX ADMIN — Medication 650 MILLIGRAM(S): at 01:46

## 2022-02-09 RX ADMIN — Medication 650 MILLIGRAM(S): at 02:20

## 2022-02-09 RX ADMIN — Medication 6 MILLIGRAM(S): at 01:46

## 2022-02-09 RX ADMIN — HEPARIN SODIUM 5000 UNIT(S): 5000 INJECTION INTRAVENOUS; SUBCUTANEOUS at 06:05

## 2022-02-09 RX ADMIN — CLOPIDOGREL BISULFATE 75 MILLIGRAM(S): 75 TABLET, FILM COATED ORAL at 11:33

## 2022-02-09 NOTE — DISCHARGE NOTE PROVIDER - NSDCMRMEDTOKEN_GEN_ALL_CORE_FT
amLODIPine 10 mg oral tablet: 1 tab(s) orally once a day AM  aspirin 81 mg oral tablet: 1 tab(s) orally once a day  clopidogrel 75 mg oral tablet: 1 tab(s) orally once a day AM  Flomax 0.4 mg oral capsule: 1 cap(s) orally once a day AM  losartan 50 mg oral tablet: 1 tab(s) orally once a day AM  metoprolol succinate 50 mg oral tablet, extended release: 1 tab(s) orally once a day AM  rosuvastatin 40 mg oral tablet: 1 tab(s) orally once a day PM

## 2022-02-09 NOTE — PROGRESS NOTE ADULT - SUBJECTIVE AND OBJECTIVE BOX
ANESTHESIA POSTOP CHECK    71y Male POSTOP DAY 1     Vital Signs Last 24 Hrs  T(C): 36.7 (09 Feb 2022 09:50), Max: 36.8 (09 Feb 2022 02:18)  T(F): 98 (09 Feb 2022 09:50), Max: 98.2 (09 Feb 2022 02:18)  HR: 69 (09 Feb 2022 09:50) (60 - 76)  BP: 121/51 (09 Feb 2022 09:50) (118/60 - 174/68)  BP(mean): 95 (08 Feb 2022 19:21) (73 - 105)  RR: 18 (09 Feb 2022 09:50) (11 - 19)  SpO2: 96% (09 Feb 2022 09:50) (92% - 100%)  I&O's Summary    08 Feb 2022 07:01  -  09 Feb 2022 07:00  --------------------------------------------------------  IN: 700 mL / OUT: 1450 mL / NET: -750 mL        [X ] NO APPARENT ANESTHESIA COMPLICATIONS      Comments:

## 2022-02-09 NOTE — DISCHARGE NOTE NURSING/CASE MANAGEMENT/SOCIAL WORK - PATIENT PORTAL LINK FT
You can access the FollowMyHealth Patient Portal offered by [facility] by registering at the following website: [email or web address]. By joining [employer]’s FollowMyHealth portal, you will also be able to view your health information using other applications (apps) compatible with our system.

## 2022-02-09 NOTE — DISCHARGE NOTE PROVIDER - CARE PROVIDER_API CALL
Vinicius Lcoo)  Surgery; Vascular Surgery  [address] [place] [address]  Phone: [phone]  Fax: [phone]  Follow Up Time: 1 week

## 2022-02-09 NOTE — DISCHARGE NOTE PROVIDER - NSDCHOSPICE_GEN_A_CORE
Pt has a new pt appt on 11/14  However, this should come from her old PCP's office at least until she is seen here, its not fair for her to run completely out and we have not seen her yet for these issues, only seen for ID issues  (She was Dr Zach Ayala patient) I will ask old PCP office to at least cover for for a few weeks until she gets in with us No

## 2022-02-09 NOTE — PROGRESS NOTE ADULT - SUBJECTIVE AND OBJECTIVE BOX
TEAM C Surgery Progress Note    INTERVAL EVENTS:   Patient is POD#1   No acute events overnight.    SUBJECTIVE: Patient seen and examined at bedside with surgical team,       OBJECTIVE:    Vital Signs Last 24 Hrs  T(C): 36.8 (09 Feb 2022 02:18), Max: 36.8 (08 Feb 2022 07:49)  T(F): 98.2 (09 Feb 2022 02:18), Max: 98.2 (08 Feb 2022 07:49)  HR: 64 (09 Feb 2022 02:18) (60 - 82)  BP: 129/60 (09 Feb 2022 02:18) (117/57 - 174/68)  BP(mean): 95 (08 Feb 2022 19:21) (72 - 105)  RR: 17 (09 Feb 2022 02:18) (11 - 19)  SpO2: 94% (09 Feb 2022 02:18) (92% - 100%)I&O's Detail    08 Feb 2022 07:01  -  09 Feb 2022 03:36  --------------------------------------------------------  IN:    Oral Fluid: 200 mL    sodium chloride 0.9%: 500 mL  Total IN: 700 mL    OUT:    Indwelling Catheter - Urethral (mL): 750 mL    Voided (mL): 100 mL  Total OUT: 850 mL    Total NET: -150 mL      MEDICATIONS  (STANDING):  amLODIPine   Tablet 10 milliGRAM(s) Oral daily  aspirin enteric coated 81 milliGRAM(s) Oral daily  atorvastatin 80 milliGRAM(s) Oral at bedtime  clopidogrel Tablet 75 milliGRAM(s) Oral daily  heparin   Injectable 5000 Unit(s) SubCutaneous every 8 hours  influenza  Vaccine (HIGH DOSE) 0.7 milliLiter(s) IntraMuscular once  lactated ringers. 1000 milliLiter(s) (30 mL/Hr) IV Continuous <Continuous>  losartan 50 milliGRAM(s) Oral daily  melatonin 6 milliGRAM(s) Oral at bedtime  metoprolol succinate ER 50 milliGRAM(s) Oral daily  sodium chloride 0.9%. 1000 milliLiter(s) (100 mL/Hr) IV Continuous <Continuous>  tamsulosin 0.4 milliGRAM(s) Oral at bedtime    MEDICATIONS  (PRN):      PHYSICAL EXAM:  Constitutional: A&Ox3, NAD  Respiratory: Unlabored breathing  Abdomen: Soft, nondistended, NTTP. No rebound or guarding.  Extremities: WWP, WALLS spontaneously    LABS:                ABO Interpretation: O (02-08-22 @ 08:01)      IMAGING:     TEAM C Surgery Progress Note    INTERVAL EVENTS:   Patient is POD#1   Overnight patient complained of moderate neck pain, controlled with 1 dose of PO tylenol.     SUBJECTIVE: Patient seen and examined at bedside with surgical team,       OBJECTIVE:    Vital Signs Last 24 Hrs  T(C): 36.8 (09 Feb 2022 02:18), Max: 36.8 (08 Feb 2022 07:49)  T(F): 98.2 (09 Feb 2022 02:18), Max: 98.2 (08 Feb 2022 07:49)  HR: 64 (09 Feb 2022 02:18) (60 - 82)  BP: 129/60 (09 Feb 2022 02:18) (117/57 - 174/68)  BP(mean): 95 (08 Feb 2022 19:21) (72 - 105)  RR: 17 (09 Feb 2022 02:18) (11 - 19)  SpO2: 94% (09 Feb 2022 02:18) (92% - 100%)I&O's Detail    08 Feb 2022 07:01  -  09 Feb 2022 03:36  --------------------------------------------------------  IN:    Oral Fluid: 200 mL    sodium chloride 0.9%: 500 mL  Total IN: 700 mL    OUT:    Indwelling Catheter - Urethral (mL): 750 mL    Voided (mL): 100 mL  Total OUT: 850 mL    Total NET: -150 mL      MEDICATIONS  (STANDING):  amLODIPine   Tablet 10 milliGRAM(s) Oral daily  aspirin enteric coated 81 milliGRAM(s) Oral daily  atorvastatin 80 milliGRAM(s) Oral at bedtime  clopidogrel Tablet 75 milliGRAM(s) Oral daily  heparin   Injectable 5000 Unit(s) SubCutaneous every 8 hours  influenza  Vaccine (HIGH DOSE) 0.7 milliLiter(s) IntraMuscular once  lactated ringers. 1000 milliLiter(s) (30 mL/Hr) IV Continuous <Continuous>  losartan 50 milliGRAM(s) Oral daily  melatonin 6 milliGRAM(s) Oral at bedtime  metoprolol succinate ER 50 milliGRAM(s) Oral daily  sodium chloride 0.9%. 1000 milliLiter(s) (100 mL/Hr) IV Continuous <Continuous>  tamsulosin 0.4 milliGRAM(s) Oral at bedtime    MEDICATIONS  (PRN):      PHYSICAL EXAM:  Constitutional: A&Ox3, NAD  Respiratory: Unlabored breathing  Abdomen: Soft, nondistended, NTTP. No rebound or guarding.  Extremities: WWP, WALLS spontaneously    LABS:                ABO Interpretation: O (02-08-22 @ 08:01)      IMAGING:     TEAM C Surgery Progress Note    INTERVAL EVENTS: Patient is POD#1 s/p Right TCAR Overnight patient complained of moderate neck pain, controlled with 1 dose of PO tylenol.     SUBJECTIVE: Patient seen and examined at bedside with surgical team. Patient without complaints. Denies HA, dizziness, vision changes, or numbness, tingling, weakness of extremities.       OBJECTIVE:    Vital Signs Last 24 Hrs  T(C): 36.8 (09 Feb 2022 02:18), Max: 36.8 (08 Feb 2022 07:49)  T(F): 98.2 (09 Feb 2022 02:18), Max: 98.2 (08 Feb 2022 07:49)  HR: 64 (09 Feb 2022 02:18) (60 - 82)  BP: 129/60 (09 Feb 2022 02:18) (117/57 - 174/68)  BP(mean): 95 (08 Feb 2022 19:21) (72 - 105)  RR: 17 (09 Feb 2022 02:18) (11 - 19)  SpO2: 94% (09 Feb 2022 02:18) (92% - 100%)I&O's Detail    08 Feb 2022 07:01  -  09 Feb 2022 03:36  --------------------------------------------------------  IN:    Oral Fluid: 200 mL    sodium chloride 0.9%: 500 mL  Total IN: 700 mL    OUT:    Indwelling Catheter - Urethral (mL): 750 mL    Voided (mL): 100 mL  Total OUT: 850 mL    Total NET: -150 mL      MEDICATIONS  (STANDING):  amLODIPine   Tablet 10 milliGRAM(s) Oral daily  aspirin enteric coated 81 milliGRAM(s) Oral daily  atorvastatin 80 milliGRAM(s) Oral at bedtime  clopidogrel Tablet 75 milliGRAM(s) Oral daily  heparin   Injectable 5000 Unit(s) SubCutaneous every 8 hours  influenza  Vaccine (HIGH DOSE) 0.7 milliLiter(s) IntraMuscular once  lactated ringers. 1000 milliLiter(s) (30 mL/Hr) IV Continuous <Continuous>  losartan 50 milliGRAM(s) Oral daily  melatonin 6 milliGRAM(s) Oral at bedtime  metoprolol succinate ER 50 milliGRAM(s) Oral daily  sodium chloride 0.9%. 1000 milliLiter(s) (100 mL/Hr) IV Continuous <Continuous>  tamsulosin 0.4 milliGRAM(s) Oral at bedtime    MEDICATIONS  (PRN):      PHYSICAL EXAM:  Constitutional: A&Ox3, NAD  Neuro: CN II-XII intact. SILT. WALLS Strength 5/5 all extremities.  Neck: Soft, supple, R incision C/D/I  Respiratory: Unlabored breathing  Abdomen: Soft, nondistended, NTTP. No rebound or guarding.  Extremities: WWP, WALLS spontaneously    LABS:

## 2022-02-09 NOTE — DISCHARGE NOTE PROVIDER - NSDCCPCAREPLAN_GEN_ALL_CORE_FT
PRINCIPAL DISCHARGE DIAGNOSIS  Diagnosis: Carotid stenosis, right  Assessment and Plan of Treatment: WOUND CARE:  Please keep incisions clean and dry. Please do not Scrub or rub incisions. Do not use lotion or powder on incisions.   BATHING: You may shower and/or sponge bathe. You may use warm soapy water in the shower and rinse, pat dry.  ACTIVITY: No heavy lifting or straining. Otherwise, you may return to your usual level of physical activity. If you are taking narcotic pain medication DO NOT drive a car, operate machinery or make important decisions.  DIET: Return to your usual diet.  NOTIFY YOUR SURGEON IF YOU HAVE: any severe, persistent HA, vision changes, speech difficulty, numbness, tingling, or weakness of extremities, bleeding that does not stop, any pus draining from your wound(s), any fever (over 100.4 F) persistent nausea/vomiting, or if your pain is not controlled on your discharge pain medications, unable to urinate.  FOLLOW UP:  1. Please follow up with your primary care physician in one week regarding your hospitalization, bring copies of your discharge paperwork.  2. Please follow up with Dr. Loco within 1-2 weeks after discharge from the hospital. You may call [phone] to make an appointment.

## 2022-02-09 NOTE — DISCHARGE NOTE PROVIDER - HOSPITAL COURSE
This patient is a 72 yo male who presented to [facility] on 2/7/22 for scheduled surgery. Pre-op diagnosis of occlusion & stenosis bilateral carotid arteries scheduled for right transcarotid artery revascularization with Dr. Loco.   Patient taken to OR as planned and underwent right TCAR. Patient tolerated operation well and there were no post-operative complications identified. Patient remained hemodynamically stable and neurologically intact in the PACU and transferred to the surgical floor. Diet was restarted and advanced as tolerated. Pain control was transitioned from IV to PO pain meds. At this time, patient is currently ambulating, voiding, tolerating a regular diet. Pain well controlled on PO pain meds. Patient has been deemed stable for discharge home with follow up as an outpatient.

## 2022-02-09 NOTE — PROGRESS NOTE ADULT - SUBJECTIVE AND OBJECTIVE BOX
Patient is s/p R TCAR for symptomatic carotid artery disease  Reports feeling well today  Endorses mild pain in surgical areas  Denies focal neuro deficits  Tolerating regular diet  Green removed this morning, patient has yet to void    Patient seen this AM sitting up in bed  Appears comfortable and NAD  No facial asymmetry  Tongue is midline  Equal strength x4  R TCAR incision and L groin venous puncture c/d/i with mild tenderness on palpation    A/P: Patient can be d/c to home once he passes TO  C/W ASA, Plavix, and statin  Follow up with vascular surgery in 2 weeks for post-op visit

## 2022-02-09 NOTE — PROGRESS NOTE ADULT - ASSESSMENT
PLAN:"          Vascular surgery   [id number] 71M PMH HTN, HLD, CAD s/p stent, CVA, carotid stenosis now s/p R TCAR. Recovering appropriately.     PLAN:  - Pain control PRN  - OOB/ Ambulate  - Regular DASH diet  - Continue home antihypertensives  - ASA/ PLavix  - VTE ppx: SQH  - Discontinue hussein catheter, TOV  - Discharge home today if passes TO    Vascular surgery   [id number]

## 2022-02-10 ENCOUNTER — EMERGENCY (EMERGENCY)
Facility: HOSPITAL | Age: 72
LOS: 1 days | Discharge: HOME | End: 2022-02-10
Attending: EMERGENCY MEDICINE | Admitting: EMERGENCY MEDICINE
Payer: MEDICARE

## 2022-02-10 VITALS
HEART RATE: 101 BPM | HEIGHT: 64.5 IN | OXYGEN SATURATION: 100 % | TEMPERATURE: 99 F | DIASTOLIC BLOOD PRESSURE: 63 MMHG | SYSTOLIC BLOOD PRESSURE: 135 MMHG | RESPIRATION RATE: 16 BRPM

## 2022-02-10 DIAGNOSIS — Z98.890 OTHER SPECIFIED POSTPROCEDURAL STATES: Chronic | ICD-10-CM

## 2022-02-10 PROBLEM — I63.9 CEREBRAL INFARCTION, UNSPECIFIED: Chronic | Status: ACTIVE | Noted: 2022-01-26

## 2022-02-10 PROCEDURE — 99053 MED SERV 10PM-8AM 24 HR FAC: CPT

## 2022-02-10 PROCEDURE — 99284 EMERGENCY DEPT VISIT MOD MDM: CPT

## 2022-02-10 NOTE — ED PROVIDER NOTE - NSFOLLOWUPINSTRUCTIONS_ED_ALL_ED_FT
You presented to the ED for urinary retention after your recent hospitalization.    We placed a hussein catheter in the ED with drainage of urine. Please follow up with your primary care provider upon discharge for regular follow up. You presented to the ED for urinary retention after your recent hospitalization.  We placed a hussein catheter in the ED with drainage of urine. Please follow up with your urologist and primary care provider upon discharge. You presented to the ED for urinary retention after your recent hospitalization.  We placed a hussein catheter in the ED with drainage of urine. Please follow up with your urologist in 24-48 hours for reassessment and trial of void.

## 2022-02-10 NOTE — ED PROVIDER NOTE - ATTENDING CONTRIBUTION TO CARE
72 y/o M with h/o HTN, CAD, hyperlipidemia, b/l carotid artery stenosis (s/p R TCAR 02/09/22) here with urinary retention.  Pt was discharged from the hospital after his procedure earlier in the evening around 7pm.  Pt states he was having some difficulty urinating after the procedure, but was able to urinate a little bit before leaving the hospital, but since returning home has not been able to at all.  (+)abd discomfort.  No fever, chills, back pain, n/v.  Well appearing, lying comfortably in stretcher, awake and alert, nontoxic.  VSS.  Abd soft ntnd hussein placed on arrival with immediate urine output and relief of symptoms.  No s/s underlying infection, likely due to recent procedure incl anesthesia.  Will dc with leg back, outpatient  follow-up for TOV as outpatient (pt follows uro at [facility]).

## 2022-02-10 NOTE — ED ADULT TRIAGE NOTE - CHIEF COMPLAINT QUOTE
d/c this morning for stent placement: unable to urinate, states he can only "dribble" urine with burning sensation, appears uncomfortable

## 2022-02-10 NOTE — ED ADULT NURSE NOTE - OBJECTIVE STATEMENT
Pt received to room 18 A&Ox4 and ambulatory. Pt C/O inability to urinate. Pt had recent procedure with hussein catheter removal yesterday. Pt with only minimal dribbling since this morning. Pt with 500ccs of yellow output with 18 Fr coude, sterile field maintained with 2 RNs at bedside. Reported relief of symptoms with insertion. NAD noted. MD gu in progress. Awaiting further orders.

## 2022-02-10 NOTE — ED PROVIDER NOTE - PHYSICAL EXAMINATION
CONSTITUTIONAL: appears uncomfortable; appears well-developed and well-nourished;   HEENT: head atraumatic; normal cephalic shape; no conjunctivitis or scleral icterus; EOMI; neck supple w/ FROM  CARDIAC: regular rate & rhythm; normal S1, S2; no murmurs, rubs or gallops.  RESPIRATORY: breath sounds clear to auscultation bilaterally; no distress present, no crackles, wheezes, rales, rhonchi, retractions, or tachypnea; normal rate and effort.  GASTROINTESTINAL: abdomen soft, non-tender, & non-distended; no organomegaly or masses; no HSM appreciated; normoactive bowel sounds.  SKIN: surgical incision closed, intact; cap refill brisk; skin warm, dry and intact; no evidence of rash.  MSK: no joint effusion or tenderness; FROM of all joints; no deformities or erythema noted; 2+ peripheral pulses.  NEURO: alert; interactive; no focal deficits.

## 2022-02-10 NOTE — ED PROVIDER NOTE - NS ED ROS FT
GENERAL: no fever, chills  HEENT: no throat pain, cough, congestion, dysphagia  CARDIAC: no chest pain, palpitations  PULM: no shortness of breath, cough, wheezing   GI: +lower abd pain; no nausea, vomiting, diarrhea, constipation  : +retention, pain.  NEURO: no headache, lightheadedness  MSK: no joint or muscle pain  SKIN: no rashes, no ulcers  HEME: no active bleeding, no supraclavicular LAD

## 2022-02-10 NOTE — ED PROVIDER NOTE - OBJECTIVE STATEMENT
72 yo M hx of HTN, CAD, HLD, b/l CA stenosis (s/p R TCAR 02/08/22) presenting with urinary retention. Pt reports that he was discharged last night after his hospitalization for R TCAR, but since discharge has been unable to urinate. 70 yo M hx of HTN, CAD, HLD, b/l CA stenosis (s/p R TCAR 02/09/22) presenting with urinary retention. Pt reports that he was discharged last night after his hospitalization for R TCAR, but since discharge has been unable to urinate.

## 2022-02-10 NOTE — ED PROVIDER NOTE - CLINICAL SUMMARY MEDICAL DECISION MAKING FREE TEXT BOX
70 yo M hx of HTN, CAD, HLD, b/l CA stenosis (s/p R TCAR 02/08/22) presenting with urinary retention. Plan for hussein, observation, and TOV prior to dc. 70 yo M hx of HTN, CAD, HLD, b/l CA stenosis (s/p R TCAR 02/08/22) presenting with urinary retention. Plan to place hussein.

## 2022-02-18 ENCOUNTER — APPOINTMENT (OUTPATIENT)
Dept: VASCULAR SURGERY | Facility: CLINIC | Age: 72
End: 2022-02-18
Payer: MEDICARE

## 2022-02-18 VITALS
TEMPERATURE: 96.9 F | HEIGHT: 66 IN | BODY MASS INDEX: 28.93 KG/M2 | SYSTOLIC BLOOD PRESSURE: 94 MMHG | HEART RATE: 80 BPM | DIASTOLIC BLOOD PRESSURE: 60 MMHG | WEIGHT: 180 LBS

## 2022-02-18 VITALS — DIASTOLIC BLOOD PRESSURE: 65 MMHG | HEART RATE: 82 BPM | SYSTOLIC BLOOD PRESSURE: 106 MMHG

## 2022-02-18 LAB
ALBUMIN SERPL ELPH-MCNC: 4.2 G/DL
ALP BLD-CCNC: 78 U/L
ALT SERPL-CCNC: 13 U/L
ANION GAP SERPL CALC-SCNC: 17 MMOL/L
AST SERPL-CCNC: 21 U/L
BASOPHILS # BLD AUTO: 0.01 K/UL
BASOPHILS NFR BLD AUTO: 0.2 %
BILIRUB SERPL-MCNC: 0.6 MG/DL
BUN SERPL-MCNC: 26 MG/DL
CALCIUM SERPL-MCNC: 9.1 MG/DL
CHLORIDE SERPL-SCNC: 100 MMOL/L
CO2 SERPL-SCNC: 24 MMOL/L
CREAT SERPL-MCNC: 1.84 MG/DL
EOSINOPHIL # BLD AUTO: 0.21 K/UL
EOSINOPHIL NFR BLD AUTO: 3.4 %
GLUCOSE SERPL-MCNC: 120 MG/DL
HCT VFR BLD CALC: 33.8 %
HGB BLD-MCNC: 11.1 G/DL
IMM GRANULOCYTES NFR BLD AUTO: 0.3 %
LYMPHOCYTES # BLD AUTO: 2.46 K/UL
LYMPHOCYTES NFR BLD AUTO: 40.1 %
MAN DIFF?: NORMAL
MCHC RBC-ENTMCNC: 28.2 PG
MCHC RBC-ENTMCNC: 32.8 GM/DL
MCV RBC AUTO: 85.8 FL
MONOCYTES # BLD AUTO: 0.8 K/UL
MONOCYTES NFR BLD AUTO: 13 %
NEUTROPHILS # BLD AUTO: 2.64 K/UL
NEUTROPHILS NFR BLD AUTO: 43 %
PLATELET # BLD AUTO: 256 K/UL
POTASSIUM SERPL-SCNC: 3 MMOL/L
PROT SERPL-MCNC: 7.2 G/DL
RBC # BLD: 3.94 M/UL
RBC # FLD: 14.6 %
SODIUM SERPL-SCNC: 141 MMOL/L
WBC # FLD AUTO: 6.14 K/UL

## 2022-02-18 PROCEDURE — 99024 POSTOP FOLLOW-UP VISIT: CPT

## 2022-02-18 PROCEDURE — 93880 EXTRACRANIAL BILAT STUDY: CPT

## 2022-02-19 NOTE — PHYSICAL EXAM
[2+] : left 2+ [Calm] : calm [de-identified] : tired appearing but otherwise NAD  [de-identified] : moist mucous membranes [de-identified] : well healed R TCAR incision with dermabond in place [de-identified] : unlabored [de-identified] : grossly intact, speaking appropriately, tongue midline

## 2022-02-19 NOTE — REVIEW OF SYSTEMS
[Feeling Poorly] : feeling poorly [Feeling Tired] : feeling tired [As Noted in HPI] : as noted in HPI [Fever] : no fever [Chills] : no chills [Chest Pain] : no chest pain [Shortness Of Breath] : no shortness of breath [Abdominal Pain] : no abdominal pain [Vomiting] : no vomiting [Diarrhea] : no diarrhea [FreeTextEntry1] : ROS per HPI

## 2022-02-19 NOTE — ASSESSMENT
[FreeTextEntry1] : Patient recently s/p R TCAR for stroke (no residual deficits). Stent is patent on ultrasound today. Continue with plavix and statin. \par \par Symptoms and vitals concerning today for possible dehydration versus blood loss from hematuria. I advised the patient to go to the emergency for further evaluation. He stated that he was feeling better and wanted to go home.  He said that he has been to the emergency room 3x since his surgery and he did not like the experience. Patient was given script for CBC and CMP and advise to go to our nearby lab after his visit. \par \par I spoke to patient at length that if any of his symptoms worsen then he should go to the emergency room. I will follow up on his blood work. \par \par

## 2022-02-19 NOTE — HISTORY OF PRESENT ILLNESS
[FreeTextEntry1] : MARY JO WILSON is a 71 year old male who is s/p R TCAR (POD 10) for symptomatic carotid artery disease. Post-op course noted for urinary retention requiring hussein catheterization. Patient was seen at urologist Dr. Rich's office last Friday and sent to [facility] ER for gross hematuria. Today patient reports he is able to void on his own without any difficulty. Denies hematuria, burning/discomfort, pain, hesitancy, etc. He is on Plavix 75 mg. ASA 81 mg discontinued because of hematuria. \par \par Today the patient states that he has not been feeling well or himself since yesterday. He feels tired, fatigued and at times lightheaded, dizzy, and also has intermittent blurred vision (bilateral) since yesterday. Denies focal neurologic deficits including amaurosis fugax, slurred speech, and weakness in the arms/legs. \par \par Patient noted to have decreased blood pressure in the office (94/60). . He stated that he had toast and coffee for breakfast at 7:30 am. He took some sips of water with his AM meds which included his antihypertensives. He was given some juice and snacks in the office. BP improved to 116 mm Hg systolic. He stated that he felt better afterwards. \par \par Carotid duplex from today shows a patent R ICA stent. L ICA with mild disease. Bilaterally antegrade vertebral flow.

## 2022-02-19 NOTE — ADDENDUM
[FreeTextEntry1] : CBC with stable H/H\par CMP - K 3.0, Cr 1.84 (1.1 pre-op), BUN 26\par \par Left message for patient. Will advise he go to the ER for hydration and K supplementation. \par \par Case discussed with Dr. Loco.

## 2022-02-28 ENCOUNTER — EMERGENCY (EMERGENCY)
Facility: HOSPITAL | Age: 72
LOS: 0 days | Discharge: HOME | End: 2022-02-28
Payer: MEDICARE

## 2022-02-28 VITALS
DIASTOLIC BLOOD PRESSURE: 80 MMHG | HEIGHT: 64.5 IN | HEART RATE: 76 BPM | SYSTOLIC BLOOD PRESSURE: 134 MMHG | TEMPERATURE: 97 F | WEIGHT: 177.03 LBS | RESPIRATION RATE: 19 BRPM | OXYGEN SATURATION: 100 %

## 2022-02-28 VITALS
HEART RATE: 73 BPM | SYSTOLIC BLOOD PRESSURE: 131 MMHG | RESPIRATION RATE: 16 BRPM | OXYGEN SATURATION: 98 % | TEMPERATURE: 98 F | DIASTOLIC BLOOD PRESSURE: 72 MMHG

## 2022-02-28 DIAGNOSIS — J18.9 PNEUMONIA, UNSPECIFIED ORGANISM: ICD-10-CM

## 2022-02-28 DIAGNOSIS — Z86.73 PERSONAL HISTORY OF TRANSIENT ISCHEMIC ATTACK (TIA), AND CEREBRAL INFARCTION WITHOUT RESIDUAL DEFICITS: ICD-10-CM

## 2022-02-28 DIAGNOSIS — Z20.822 CONTACT WITH AND (SUSPECTED) EXPOSURE TO COVID-19: ICD-10-CM

## 2022-02-28 DIAGNOSIS — Z95.5 PRESENCE OF CORONARY ANGIOPLASTY IMPLANT AND GRAFT: ICD-10-CM

## 2022-02-28 DIAGNOSIS — I11.0 HYPERTENSIVE HEART DISEASE WITH HEART FAILURE: ICD-10-CM

## 2022-02-28 DIAGNOSIS — J10.1 INFLUENZA DUE TO OTHER IDENTIFIED INFLUENZA VIRUS WITH OTHER RESPIRATORY MANIFESTATIONS: ICD-10-CM

## 2022-02-28 DIAGNOSIS — Z98.890 OTHER SPECIFIED POSTPROCEDURAL STATES: Chronic | ICD-10-CM

## 2022-02-28 DIAGNOSIS — R05.1 ACUTE COUGH: ICD-10-CM

## 2022-02-28 DIAGNOSIS — E78.5 HYPERLIPIDEMIA, UNSPECIFIED: ICD-10-CM

## 2022-02-28 DIAGNOSIS — E87.6 HYPOKALEMIA: ICD-10-CM

## 2022-02-28 DIAGNOSIS — R00.2 PALPITATIONS: ICD-10-CM

## 2022-02-28 DIAGNOSIS — I50.9 HEART FAILURE, UNSPECIFIED: ICD-10-CM

## 2022-02-28 DIAGNOSIS — Z86.79 PERSONAL HISTORY OF OTHER DISEASES OF THE CIRCULATORY SYSTEM: ICD-10-CM

## 2022-02-28 DIAGNOSIS — Z79.02 LONG TERM (CURRENT) USE OF ANTITHROMBOTICS/ANTIPLATELETS: ICD-10-CM

## 2022-02-28 DIAGNOSIS — Z79.82 LONG TERM (CURRENT) USE OF ASPIRIN: ICD-10-CM

## 2022-02-28 LAB
ALBUMIN SERPL ELPH-MCNC: 3.4 G/DL — SIGNIFICANT CHANGE UP (ref 3.3–5)
ALP SERPL-CCNC: 78 U/L — SIGNIFICANT CHANGE UP (ref 40–120)
ALT FLD-CCNC: 17 U/L — SIGNIFICANT CHANGE UP (ref 12–78)
ANION GAP SERPL CALC-SCNC: 3 MMOL/L — LOW (ref 5–17)
AST SERPL-CCNC: 16 U/L — SIGNIFICANT CHANGE UP (ref 15–37)
BASOPHILS # BLD AUTO: 0.01 K/UL — SIGNIFICANT CHANGE UP (ref 0–0.2)
BASOPHILS NFR BLD AUTO: 0.2 % — SIGNIFICANT CHANGE UP (ref 0–2)
BILIRUB SERPL-MCNC: 0.4 MG/DL — SIGNIFICANT CHANGE UP (ref 0.2–1.2)
BUN SERPL-MCNC: 16 MG/DL — SIGNIFICANT CHANGE UP (ref 7–23)
CALCIUM SERPL-MCNC: 8.9 MG/DL — SIGNIFICANT CHANGE UP (ref 8.5–10.1)
CHLORIDE SERPL-SCNC: 108 MMOL/L — SIGNIFICANT CHANGE UP (ref 96–108)
CO2 SERPL-SCNC: 30 MMOL/L — SIGNIFICANT CHANGE UP (ref 22–31)
CREAT SERPL-MCNC: 1.22 MG/DL — SIGNIFICANT CHANGE UP (ref 0.5–1.3)
D DIMER BLD IA.RAPID-MCNC: 277 NG/ML DDU — HIGH
EGFR: 63 ML/MIN/1.73M2 — SIGNIFICANT CHANGE UP
EOSINOPHIL # BLD AUTO: 0.16 K/UL — SIGNIFICANT CHANGE UP (ref 0–0.5)
EOSINOPHIL NFR BLD AUTO: 2.8 % — SIGNIFICANT CHANGE UP (ref 0–6)
FLUAV AG NPH QL: DETECTED
FLUBV AG NPH QL: SIGNIFICANT CHANGE UP
GLUCOSE SERPL-MCNC: 102 MG/DL — HIGH (ref 70–99)
HCT VFR BLD CALC: 32.2 % — LOW (ref 39–50)
HGB BLD-MCNC: 10.9 G/DL — LOW (ref 13–17)
IMM GRANULOCYTES NFR BLD AUTO: 0.4 % — SIGNIFICANT CHANGE UP (ref 0–1.5)
LYMPHOCYTES # BLD AUTO: 2.23 K/UL — SIGNIFICANT CHANGE UP (ref 1–3.3)
LYMPHOCYTES # BLD AUTO: 39.3 % — SIGNIFICANT CHANGE UP (ref 13–44)
MAGNESIUM SERPL-MCNC: 2.4 MG/DL — SIGNIFICANT CHANGE UP (ref 1.6–2.6)
MCHC RBC-ENTMCNC: 28.5 PG — SIGNIFICANT CHANGE UP (ref 27–34)
MCHC RBC-ENTMCNC: 33.9 G/DL — SIGNIFICANT CHANGE UP (ref 32–36)
MCV RBC AUTO: 84.3 FL — SIGNIFICANT CHANGE UP (ref 80–100)
MONOCYTES # BLD AUTO: 0.45 K/UL — SIGNIFICANT CHANGE UP (ref 0–0.9)
MONOCYTES NFR BLD AUTO: 7.9 % — SIGNIFICANT CHANGE UP (ref 2–14)
NEUTROPHILS # BLD AUTO: 2.81 K/UL — SIGNIFICANT CHANGE UP (ref 1.8–7.4)
NEUTROPHILS NFR BLD AUTO: 49.4 % — SIGNIFICANT CHANGE UP (ref 43–77)
NRBC # BLD: 0 /100 WBCS — SIGNIFICANT CHANGE UP (ref 0–0)
NT-PROBNP SERPL-SCNC: 48 PG/ML — SIGNIFICANT CHANGE UP (ref 0–125)
PLATELET # BLD AUTO: 231 K/UL — SIGNIFICANT CHANGE UP (ref 150–400)
POTASSIUM SERPL-MCNC: 3 MMOL/L — LOW (ref 3.5–5.3)
POTASSIUM SERPL-SCNC: 3 MMOL/L — LOW (ref 3.5–5.3)
PROT SERPL-MCNC: 7.2 GM/DL — SIGNIFICANT CHANGE UP (ref 6–8.3)
RBC # BLD: 3.82 M/UL — LOW (ref 4.2–5.8)
RBC # FLD: 14.4 % — SIGNIFICANT CHANGE UP (ref 10.3–14.5)
SARS-COV-2 RNA SPEC QL NAA+PROBE: SIGNIFICANT CHANGE UP
SODIUM SERPL-SCNC: 141 MMOL/L — SIGNIFICANT CHANGE UP (ref 135–145)
TROPONIN I, HIGH SENSITIVITY RESULT: 12.4 NG/L — SIGNIFICANT CHANGE UP
WBC # BLD: 5.68 K/UL — SIGNIFICANT CHANGE UP (ref 3.8–10.5)
WBC # FLD AUTO: 5.68 K/UL — SIGNIFICANT CHANGE UP (ref 3.8–10.5)

## 2022-02-28 PROCEDURE — 71046 X-RAY EXAM CHEST 2 VIEWS: CPT | Mod: 26

## 2022-02-28 PROCEDURE — 99285 EMERGENCY DEPT VISIT HI MDM: CPT

## 2022-02-28 PROCEDURE — 93010 ELECTROCARDIOGRAM REPORT: CPT

## 2022-02-28 RX ORDER — POTASSIUM CHLORIDE 20 MEQ
10 PACKET (EA) ORAL ONCE
Refills: 0 | Status: COMPLETED | OUTPATIENT
Start: 2022-02-28 | End: 2022-02-28

## 2022-02-28 RX ORDER — SACCHAROMYCES BOULARDII 250 MG
1 POWDER IN PACKET (EA) ORAL
Qty: 60 | Refills: 0
Start: 2022-02-28 | End: 2022-03-29

## 2022-02-28 RX ORDER — POTASSIUM CHLORIDE 20 MEQ
40 PACKET (EA) ORAL ONCE
Refills: 0 | Status: COMPLETED | OUTPATIENT
Start: 2022-02-28 | End: 2022-02-28

## 2022-02-28 RX ORDER — POTASSIUM CHLORIDE 20 MEQ
10 PACKET (EA) ORAL ONCE
Refills: 0 | Status: DISCONTINUED | OUTPATIENT
Start: 2022-02-28 | End: 2022-02-28

## 2022-02-28 RX ORDER — POTASSIUM CHLORIDE 20 MEQ
20 PACKET (EA) ORAL ONCE
Refills: 0 | Status: DISCONTINUED | OUTPATIENT
Start: 2022-02-28 | End: 2022-02-28

## 2022-02-28 RX ADMIN — Medication 100 MILLIEQUIVALENT(S): at 16:25

## 2022-02-28 RX ADMIN — Medication 1 TABLET(S): at 16:27

## 2022-02-28 RX ADMIN — Medication 100 MILLIEQUIVALENT(S): at 15:28

## 2022-02-28 RX ADMIN — Medication 10 MILLIEQUIVALENT(S): at 16:26

## 2022-02-28 RX ADMIN — Medication 40 MILLIEQUIVALENT(S): at 15:48

## 2022-02-28 RX ADMIN — Medication 100 MILLIGRAM(S): at 16:26

## 2022-02-28 NOTE — ED PROVIDER NOTE - CLINICAL SUMMARY MEDICAL DECISION MAKING FREE TEXT BOX
72 yo M hx of HTN, CAD, HLD, CVA, HF pw sob noted today in the AM on waking with palpitations that resolved associated with wet cough x1 wk in duration. No exertional SOB or chest pain. The symptoms resolved pta to the ED. No provoking or modifying factors. +R lobe rales with influenza+, early pneumonia vs viral pneumonia, pt aware will proceed with abx, d-dimer 277 DDU but age adjusted 365, in the setting of virus likely elevated appropriately for age. Pt has no other signs of respiratory distress or sob. Additionally pt had low K+ given 2 K riders and 40 mg PO in the ED.     Return precautions for worsening sob given in the ED and pt will cont empiric abx therapy due to rales on R lower lobe and risk factors.

## 2022-02-28 NOTE — ED PROVIDER NOTE - PROGRESS NOTE DETAILS
Pt is well appearing with no signs of respiratory distress. Discussed with pt admission vs outpatient abx, pt reports that he wants to try outpatient abx. Given return precautions for worsening sob, fevers, pleurisy, increased secretions.

## 2022-02-28 NOTE — ED PROVIDER NOTE - PATIENT PORTAL LINK FT
You can access the FollowMyHealth Patient Portal offered by [facility] by registering at the following website: [email or web address]. By joining [email or web address]’s FollowMyHealth portal, you will also be able to view your health information using other applications (apps) compatible with our system.

## 2022-02-28 NOTE — ED PROVIDER NOTE - OBJECTIVE STATEMENT
70 yo M hx of HTN, CAD, HLD, CVA, HF pw sob noted today in the AM on waking with palpitations that resolved associated with wet cough x1 wk in duration. No exertional SOB or chest pain. The symptoms resolved pta to the ED. No provoking or modifying factors.    I have reviewed available current nursing and previous documentation of past medical, surgical, family, and/or social history.

## 2022-02-28 NOTE — ED PROVIDER NOTE - CCCP TRG CHIEF CMPLNT
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     Involving L3.  Minimal bone marrow involvement.    Pathologist reviewed his case and felt that he has myeloma.     2017, September 2017 - evaluated for elevation of WBC as well as new right posterior chest wall pain     CT abdomen pelvis showed 6-7 mm sclerotic lesion in the L3 vertebral body worrisome for malignancy and abnormally enlarged retroperitoneal and the pelvic adenopathy.      Biopsy of the L3 spine showed plasma cell neoplasm as well as synchronous metastatic prostate adenocarcinoma.      M spike of 0.4 g/dL, monoclonal IgA kappa population, bone marrow involvement of less than 5% by a monoclonal kappa plasma cell proliferation, and the kappa free light chain of 2.10 mg/dL.     12/06/18 - started on treatment with Velcade + dexamethasone.  He was off treatment from April until May due to being transferred to alf.      05/11/18 BM BX - slightly hypocellular marrow involved by kappa light chain restricted plasma cell dyscrasia (10-15%).  Increased iron stores.     2) Metastatic prostate cancer (retroperitoneal pelvic lymph nodes).      His initial PSA @ 73.      11/24/17 - started Lupron therapy.       01/04/18 - received 2000 cGy in 1 fractions targeted to the L3 spine region by [facility] radiation oncology at [place], [place].     06/07/18 L3 biopsy - metastatic ALEXX of prostate origin and kappa light chain restricted plasma cell dyscrasia (10-15%)    07/21/18 - Due to his young age added Zytiga, 1000 mg daily + 5 mg of prednisone twice a day (Latitude study).         3) Thyroid nodule:     05/30/18 NM PET  - moderate diffuse uptake in the thyroid suggesting autoimmune thyroiditis.  No FDG avid adenopathy, including in the mediastinum.  Existing FDG avid adenopathy and bilateral pulmonary opacities have resolved, which suggest prior findings represented an infectious/inflammatory process. No FDG avid skeletal lesion.  Stable non-FDG avid sclerotic L3 lesion    Ultrasound did not show any nodules and was consistent with diffuse thyroiditis.      Will monitor his thyroid function.      He had been followed every 6 months or so by ENT    4) Low back pain:     Stable, Chronic.  Can only sleep on left side.     Currently on Norco plans to start on ER MSO4    Likely from his spinal stenosis and L3    I will refer him to the spine center    08/29/18:    57 y.o.     Patient looks and feels quite good other than chronic back pain and mild joint swelling and myalgias from Zytiga.  He denies N/V.  His PS is quite stable.    Chronic back pain - currently managed with Norco with plans to switch to ER MSO4 @ his facility.    MR L-S spine requested.    Apparently [facility] was [facility] by [facility].    CMP - acute elevation ALT.  Otherwise basically WNL.    On Naprosyn and Tylenol, maybe 4-5 past 4-5 days.    Zytiga can cause elevations in LFTs    Will monitor    CBC -WBC, ANC and PLTs WNL.  HgB stable @ 12.4.    PSA - pending (previously stable @ ~5).    Continue Zytiga, 1000 mg daily + 5 mg of prednisone twice a day (Latitude study).  On the days that he is getting the dexamethasone for his Velcade he is to HOLD the prednisone.     10/24/18 next every 3-month Lupron due.    MM labs - kappa/lambda ratio - WNL.  SPEP show 2 faint bands too faint to quantify.    Proceed D1C4 weekly Velcade + dexamethasone @ 10 mg weekly.     09/26/18 - D1C5 Velcade follow up    TSH - 5.22    Increase dose synthroid from 88 mcg to 100 mcg.    ECOG Performance:     ECOG Performance Status: 1     Distress Assessment:    Distress Assessment Score: 3: Encourage him to try to see the psychologist at the residential if he can    Pain:  Currently in Pain: Yes  Pain Score  (Initial OR Reassessment): 5  Location: sejal wrists and ankles        TT > 25 minutes face to face with > 50% counseling and care coordination.    Kirby Greene, CNP  _____________________________________________    Interim History:    The patient presents for ongoing weekly Velcade.  He states that he is doing okay with the injections and has not noted any appreciable side effects.  He takes trazodone for insomnia and has been sleeping better with the decreased dose dex.  His only other complaint is his chronic low back pain.  He is currently managed with Norco with plans to switch to ER MSO4 @ his facility.  He denies any significant peripheral neuropathy.  His appetite, weight and performance status are very stable.  He denies cough,, fever, chills, unusual headaches, visual or mentation disturbance, bowel or bladder issues.  He has gained about 20 pounds weight in the past 6 months.    Pain Status:    Currently in Pain: Yes    Review of Systems:    Constitutional  Constitutional (WDL): Exceptions to WDL  Fatigue: Fatigue relieved by rest  Neurosensory  Neurosensory (WDL): Exceptions to WDL  Peripheral Motor Neuropathy: Asymptomatic, clinical or diagnostic observations only, intervention not indicated (sejal wrist)  Eye   Eye Disorder (WDL): All eye disorder elements are within defined limits  Ear  Ear Disorder (WDL): All ear disorder elements are within defined limits  Cardiovascular  Cardiovascular (WDL): All cardiovascular elements are within defined limits  Pulmonary  Respiratory (WDL): Exceptions to WDL  Dyspnea: Shortness of breath with moderate exertion  Gastrointestinal  Gastrointestinal (WDL): All gastrointestinal elements are within defined limits  Genitourinary  Genitourinary (WDL): All genitourinary elements are within defined limits  Lymphatic  Lymph (WDL): All lymph disorder elements are within defined limits  Musculoskeletal and Connective Tissue  Musculoskeletal and Connetive Tissue Disorders  (WDL): Exceptions to WDL  Bone Pain: Mild pain (sejal wrists)  Myalgia: Mild pain  Integumentary  Integumentary (WDL): All integumentary elements are within defined limits  Patient Coping  Patient Coping: Accepting  Distress Assessment  Distress Assessment Score: 3  Accompanied by  Accompanied by:  Enforcment  Oral Chemo Adherence       Past Histories:    Past Medical History:   Diagnosis Date     Hypertension      Metastatic cancer (H)      Multiple myeloma (H)      Plasmacytoma (H)      Prostate cancer (H)      PHYSICAL EXAM:    /85  Pulse 84  Temp 98.2  F (36.8  C) (Oral)   Wt (!) 225 lb (102.1 kg)  SpO2 95%  BMI 32.28 kg/m2    GENERAL:   No acute distress. Cooperative. Comfortable.  Accompanied by 2 guards from DOC  HEENT:   RAMU.  Anicteric sclera.  Oromucosa is clean and intact.  No ulcerations, bleeding or mucositis noted.   RESP:    Lungs are clear bilaterally. No wheezes or rhonchi.  CV:    Regular, rate and rhythm. No murmurs heard.  ABD:    Soft, nontender. Positive bowel sounds. No organomegaly.   EXTREMITIES:  No lower extremity swelling.   NEURO:   Non focal. Alert and oriented x3.   PSYCH:   Within normal limits. No apparent depression or anxiety.  SKIN:    Warm dry intact.  Nor rash or bruising noted.   LYMPH:   No palpable cervical, supraclavicular, axillary or inguinal lymphadenopathy    Lab Results:    Reviewed with patient.    Recent Results (from the past 168 hour(s))   TSH (add-ons/treatment plan)   Result Value Ref Range    TSH 5.22 (H) 0.30 - 5.00 uIU/mL   Comprehensive Metabolic Panel   Result Value Ref Range    Sodium 143 136 - 145 mmol/L    Potassium 3.8 3.5 - 5.0 mmol/L    Chloride 109 (H) 98 - 107 mmol/L    CO2 27 22 - 31 mmol/L    Anion Gap, Calculation 7 5 - 18 mmol/L    Glucose 78 70 - 125 mg/dL    BUN 17 8 - 22 mg/dL    Creatinine 0.71 0.70 - 1.30 mg/dL    GFR MDRD Af Amer >60 >60 mL/min/1.73m2    GFR MDRD Non Af Amer >60 >60 mL/min/1.73m2    Bilirubin, Total 0.4 0.0 -  1.0 mg/dL    Calcium 9.6 8.5 - 10.5 mg/dL    Protein, Total 6.8 6.0 - 8.0 g/dL    Albumin 3.8 3.5 - 5.0 g/dL    Alkaline Phosphatase 41 (L) 45 - 120 U/L    AST 36 0 - 40 U/L     (H) 0 - 45 U/L   HM1 (CBC with Diff)   Result Value Ref Range    WBC 9.2 4.0 - 11.0 thou/uL    RBC 3.52 (L) 4.40 - 6.20 mill/uL    Hemoglobin 12.4 (L) 14.0 - 18.0 g/dL    Hematocrit 37.1 (L) 40.0 - 54.0 %     (H) 80 - 100 fL    MCH 35.2 (H) 27.0 - 34.0 pg    MCHC 33.4 32.0 - 36.0 g/dL    RDW 13.6 11.0 - 14.5 %    Platelets 227 140 - 440 thou/uL    MPV 11.4 8.5 - 12.5 fL    Neutrophils % 66 50 - 70 %    Lymphocytes % 21 20 - 40 %    Monocytes % 11 (H) 2 - 10 %    Eosinophils % 2 0 - 6 %    Basophils % 1 0 - 2 %    Neutrophils Absolute 6.0 2.0 - 7.7 thou/uL    Lymphocytes Absolute 1.9 0.8 - 4.4 thou/uL    Monocytes Absolute 1.0 (H) 0.0 - 0.9 thou/uL    Eosinophils Absolute 0.2 0.0 - 0.4 thou/uL    Basophils Absolute 0.1 0.0 - 0.2 thou/uL       Imaging:    No results found.       palpitations

## 2022-02-28 NOTE — ED ADULT TRIAGE NOTE - CHIEF COMPLAINT QUOTE
CP, Fluttering and palpitations since this morning 830am  HX HTN, HLD, Cardiac catherization, HF, carotid artery stent placement  On plavix

## 2022-02-28 NOTE — ED PROVIDER NOTE - NSFOLLOWUPINSTRUCTIONS_ED_ALL_ED_FT
Pneumonia    Pneumonia is an infection of the lungs. Pneumonia may be caused by bacteria, viruses, or funguses. Symptoms include coughing, fever, chest pain when breathing deeply or coughing, shortness of breath, fatigue, or muscle aches. Pneumonia can be diagnosed with a medical history and physical exam, as well as other tests which may include a chest X-ray. If you were prescribed an antibiotic medicine, take it as told by your health care provider and do not stop taking the antibiotic even if you start to feel better. Do not use tobacco products, including cigarettes, chewing tobacco, and e-cigarettes.    SEEK IMMEDIATE MEDICAL CARE IF YOU HAVE THE FOLLOWING SYMPTOMS: worsening shortness of breath, worsening chest pain, coughing up blood, change in mental status, lightheadedness/dizziness.    Hypokalemia  Hypokalemia means that the amount of potassium in the blood is lower than normal. Potassium is a chemical (electrolyte) that helps regulate the amount of fluid in the body. It also stimulates muscle tightening (contraction) and helps nerves work properly.  Normally, most of the body's potassium is inside cells, and only a very small amount is in the blood. Because the amount in the blood is so small, minor changes to potassium levels in the blood can be life-threatening.  What are the causes?  This condition may be caused by:  Antibiotic medicine.Diarrhea or vomiting. Taking too much of a medicine that helps you have a bowel movement (laxative) can cause diarrhea and lead to hypokalemia.Chronic kidney disease (CKD).Medicines that help the body get rid of excess fluid (diuretics).Eating disorders, such as bulimia.Low magnesium levels in the body.Sweating a lot.What are the signs or symptoms?  Symptoms of this condition include:  Weakness.Constipation.Fatigue.Muscle cramps.Mental confusion.Skipped heartbeats or irregular heartbeat (palpitations).Tingling or numbness.How is this diagnosed?  This condition is diagnosed with a blood test.  How is this treated?  This condition may be treated by:  Taking potassium supplements by mouth.Adjusting the medicines that you take.Eating more foods that contain a lot of potassium.If your potassium level is very low, you may need to get potassium through an IV and be monitored in the hospital.  Follow these instructions at home:     Take over-the-counter and prescription medicines only as told by your health care provider. This includes vitamins and supplements.Eat a healthy diet. A healthy diet includes fresh fruits and vegetables, whole grains, healthy fats, and lean proteins.If instructed, eat more foods that contain a lot of potassium. This includes:  Nuts, such as peanuts and pistachios.Seeds, such as sunflower seeds and pumpkin seeds.Peas, lentils, and lima beans.Whole grain and bran cereals and breads.Fresh fruits and vegetables, such as apricots, avocado, bananas, cantaloupe, kiwi, oranges, tomatoes, asparagus, and potatoes.Orange juice.Tomato juice.Red meats.Yogurt.Keep all follow-up visits as told by your health care provider. This is important.Contact a health care provider if you:  Have weakness that gets worse.Feel your heart pounding or racing.Vomit.Have diarrhea.Have diabetes (diabetes mellitus) and you have trouble keeping your blood sugar (glucose) in your target range.Get help right away if you:  Have chest pain.Have shortness of breath.Have vomiting or diarrhea that lasts for more than 2 days.Faint.Summary  Hypokalemia means that the amount of potassium in the blood is lower than normal.This condition is diagnosed with a blood test.Hypokalemia may be treated by taking potassium supplements, adjusting the medicines that you take, or eating more foods that are high in potassium.If your potassium level is very low, you may need to get potassium through an IV and be monitored in the hospital.

## 2022-02-28 NOTE — ED PROVIDER NOTE - CARE PLAN
Principal Discharge DX:	Influenza A  Secondary Diagnosis:	Hypokalemia   1 Principal Discharge DX:	Influenza A  Secondary Diagnosis:	Hypokalemia  Secondary Diagnosis:	Pneumonia

## 2022-02-28 NOTE — ED ADULT NURSE NOTE - OBJECTIVE STATEMENT
pt 70 y/o male c/c of dizziness, palpitations, chest pain onset this moring. AAOX4, speaking in full sentences. pt had carotid endarterectomy 2/8/22. PMH of stroke 12/21, HTN, HDL, HF. pt on plavix.

## 2022-02-28 NOTE — ED PROVIDER NOTE - NS ED ROS FT
Review of Systems    Constitutional: (-) fever  Eyes/ENT: (-) change in vision, (-) sore throat, (-) ear pain  Cardiovascular: (-) chest pain  Respiratory: SEE HPI  Gastrointestinal: (-) abdominal pain (-) vomiting  Musculoskeletal: (-) neck pain, (-) back pain  Integumentary: (-) rash, (-) edema  Neurological: (-) headache, (-) altered mental status  Heme/Lymph: (-) easy bruising (-) easy bleeding  Allergic/Immunologic: (-) pruritus

## 2022-05-27 NOTE — ED PROVIDER NOTE - NS_EDPROVIDERDISPOUSERTYPE_ED_A_ED
"Anesthesia Transfer of Care Note    Patient: Zuleika Reich    Procedure(s) Performed: Procedure(s) (LRB):  GASTRECTOMY, SLEEVE, LAPAROSCOPIC- 73317 (N/A)    Patient location: PACU    Anesthesia Type: general    Transport from OR: Transported from OR on 6-10 L/min O2 by face mask with adequate spontaneous ventilation    Post pain: adequate analgesia    Post assessment: no apparent anesthetic complications    Post vital signs: stable    Level of consciousness: awake    Nausea/Vomiting: no nausea/vomiting    Complications: none    Transfer of care protocol was followed      Last vitals:   Visit Vitals  BP (!) 143/75 (BP Location: Right arm, Patient Position: Lying)   Pulse 76   Temp 36.6 °C (97.8 °F) (Temporal)   Resp 16   Ht 5' 5" (1.651 m)   Wt 123.7 kg (272 lb 11.3 oz)   SpO2 96%   Breastfeeding? No   BMI 45.38 kg/m²     " normal gait and station , no tenderness or deformities present Attending Attestation (For Attendings USE Only)...

## 2022-06-10 RX ORDER — CLOPIDOGREL BISULFATE 75 MG/1
75 TABLET, FILM COATED ORAL DAILY
Qty: 90 | Refills: 3 | Status: ACTIVE | COMMUNITY
Start: 2022-01-21 | End: 1900-01-01

## 2022-08-04 ENCOUNTER — APPOINTMENT (OUTPATIENT)
Dept: VASCULAR SURGERY | Facility: CLINIC | Age: 72
End: 2022-08-04

## 2022-08-04 VITALS — DIASTOLIC BLOOD PRESSURE: 85 MMHG | HEART RATE: 64 BPM | SYSTOLIC BLOOD PRESSURE: 131 MMHG

## 2022-08-04 VITALS
HEIGHT: 60 IN | SYSTOLIC BLOOD PRESSURE: 131 MMHG | HEART RATE: 64 BPM | TEMPERATURE: 96 F | WEIGHT: 180 LBS | BODY MASS INDEX: 35.34 KG/M2 | DIASTOLIC BLOOD PRESSURE: 83 MMHG

## 2022-08-04 DIAGNOSIS — I65.23 OCCLUSION AND STENOSIS OF BILATERAL CAROTID ARTERIES: ICD-10-CM

## 2022-08-04 PROCEDURE — 99212 OFFICE O/P EST SF 10 MIN: CPT

## 2022-08-04 PROCEDURE — 93880 EXTRACRANIAL BILAT STUDY: CPT

## 2022-08-05 PROBLEM — I65.23 CAROTID STENOSIS, BILATERAL: Status: ACTIVE | Noted: 2022-01-21

## 2022-08-05 NOTE — HISTORY OF PRESENT ILLNESS
[FreeTextEntry1] : MARY JO WILSON is a 71 year old male who is s/p R TCAR (2/2022) for symptomatic carotid artery disease. He is on Plavix 75 mg and ASA 81 mg. No recurrence of hematuria. \par \par Today he complains of neck pain described as a merced horse in the R neck. Had this discomfort at least three times. Cramp resolves spontaneously after several minutes. Otherwise denies focal neurologic deficits including amaurosis fugax, slurred speech, and weakness in the arms/legs. No dizziness/lightheadedness. \par \par Carotid duplex from today shows a patent R ICA stent; acoustic shadowing noted at the bulb. L ICA with mild disease. Bilaterally antegrade vertebral flow.

## 2022-08-05 NOTE — ASSESSMENT
[FreeTextEntry1] : A/P:\par \par Doing well s/p TCAR for symptomatic carotid. Follow up in 6 months for repeat duplex. Agreeable to telehealth. \par \par Neck spasms/cervicalgia not from carotids or the stent. Advised to discuss with his PMD.

## 2022-08-05 NOTE — PHYSICAL EXAM
[2+] : left 2+ [Calm] : calm [de-identified] : NAD  [de-identified] : well healed R TCAR incision  [de-identified] : unlabored [de-identified] : grossly intact

## 2022-08-25 NOTE — PROGRESS NOTE ADULT - ASSESSMENT
71 y.o Male PMH of CAD presented to the ED after having a episode of slurred speech and confusion a few days prior, he believed that he deanne a lot of alcohol however that was not the cause. Was here  1/4 was found to have 70% blockage in the ICA, was going to be admitted however patient wanted to leave to turn off his heater.     #ICU occlusion  - MRI brain no-contrast ordered to r/o acute CVA.   - CT head notes chronic changes.  CTA notes 70 % right ICA stenosis.  - Carotid dopplers noted. TTE, lipids, Hba1C, TSH, b12 pending  - neuro eval requested    #CAD  - Continue all home meds of Norvasc, ASA, Crestor, Toprol, HCTZ    #BPH   - c/w Flomax    #DVTppx  - SCDs How Severe Are Your Warts?: moderate Is This A New Presentation, Or A Follow-Up?: Follow Up Yosvany Additional History: Pt is here for a wart follow up. The wart on her left foot is recurring and the Pt states that a new wart has appeared on her right foot. 71 y.o Male PMH of CAD presented to the ED after having a episode of slurred speech and confusion a few days prior, he believed that he deanne a lot of alcohol however that was not the cause. Was here  1/4 was found to have 70% blockage in the ICA, was going to be admitted however patient wanted to leave to turn off his heater.     #ICU occlusion  - MRI brain no-contrast ordered to r/o acute CVA -- shows R MCA stenosis involving watershed zones -- d/w neuro, vascular team -- nonurgent surgical intervention warranted -- pt advised to follow closely with Dr. Loco for R CEA as outpatient within 1 week -- will monitor overnight on tele, allow permissive HTN and start ASA 81mg daily (had bruising w/ DAPT in the past)  - CT head notes chronic changes.  CTA notes 70 % right ICA stenosis.  - Carotid dopplers noted. TTE shows Gr1 DD, otherwise grossly WNL, lipid profile pending, Hba1C 6.0, TSH WNL, b12 pending  - neuro/vascular/cardio eval noted    #CAD  - Continue all home meds of Norvasc, ASA, Crestor, Toprol, HCTZ    #BPH   - c/w Flomax    #DVTppx  - SCDs

## 2022-09-21 ENCOUNTER — INPATIENT (INPATIENT)
Facility: HOSPITAL | Age: 72
LOS: 1 days | Discharge: ANOTHER HOSPITAL | End: 2022-09-23
Attending: INTERNAL MEDICINE | Admitting: INTERNAL MEDICINE

## 2022-09-21 VITALS
DIASTOLIC BLOOD PRESSURE: 91 MMHG | WEIGHT: 188.94 LBS | RESPIRATION RATE: 18 BRPM | HEART RATE: 65 BPM | OXYGEN SATURATION: 97 % | SYSTOLIC BLOOD PRESSURE: 149 MMHG | TEMPERATURE: 98 F | HEIGHT: 64.5 IN

## 2022-09-21 DIAGNOSIS — I25.10 ATHEROSCLEROTIC HEART DISEASE OF NATIVE CORONARY ARTERY WITHOUT ANGINA PECTORIS: ICD-10-CM

## 2022-09-21 DIAGNOSIS — I10 ESSENTIAL (PRIMARY) HYPERTENSION: ICD-10-CM

## 2022-09-21 DIAGNOSIS — R94.31 ABNORMAL ELECTROCARDIOGRAM [ECG] [EKG]: ICD-10-CM

## 2022-09-21 DIAGNOSIS — E78.5 HYPERLIPIDEMIA, UNSPECIFIED: ICD-10-CM

## 2022-09-21 DIAGNOSIS — N40.0 BENIGN PROSTATIC HYPERPLASIA WITHOUT LOWER URINARY TRACT SYMPTOMS: ICD-10-CM

## 2022-09-21 DIAGNOSIS — Z86.73 PERSONAL HISTORY OF TRANSIENT ISCHEMIC ATTACK (TIA), AND CEREBRAL INFARCTION WITHOUT RESIDUAL DEFICITS: ICD-10-CM

## 2022-09-21 DIAGNOSIS — R07.9 CHEST PAIN, UNSPECIFIED: ICD-10-CM

## 2022-09-21 DIAGNOSIS — Z98.890 OTHER SPECIFIED POSTPROCEDURAL STATES: Chronic | ICD-10-CM

## 2022-09-21 LAB
ALBUMIN SERPL ELPH-MCNC: 3.4 G/DL — SIGNIFICANT CHANGE UP (ref 3.3–5)
ALP SERPL-CCNC: 77 U/L — SIGNIFICANT CHANGE UP (ref 40–120)
ALT FLD-CCNC: 19 U/L — SIGNIFICANT CHANGE UP (ref 12–78)
ANION GAP SERPL CALC-SCNC: 6 MMOL/L — SIGNIFICANT CHANGE UP (ref 5–17)
APTT BLD: 33.1 SEC — SIGNIFICANT CHANGE UP (ref 27.5–35.5)
AST SERPL-CCNC: 41 U/L — HIGH (ref 15–37)
BASOPHILS # BLD AUTO: 0.02 K/UL — SIGNIFICANT CHANGE UP (ref 0–0.2)
BASOPHILS NFR BLD AUTO: 0.4 % — SIGNIFICANT CHANGE UP (ref 0–2)
BILIRUB SERPL-MCNC: 0.6 MG/DL — SIGNIFICANT CHANGE UP (ref 0.2–1.2)
BUN SERPL-MCNC: 18 MG/DL — SIGNIFICANT CHANGE UP (ref 7–23)
CALCIUM SERPL-MCNC: 9.3 MG/DL — SIGNIFICANT CHANGE UP (ref 8.5–10.1)
CHLORIDE SERPL-SCNC: 109 MMOL/L — HIGH (ref 96–108)
CK MB BLD-MCNC: 0.5 % — SIGNIFICANT CHANGE UP (ref 0–3.5)
CK MB CFR SERPL CALC: 1.9 NG/ML — SIGNIFICANT CHANGE UP (ref 0.5–3.6)
CK SERPL-CCNC: 406 U/L — HIGH (ref 26–308)
CO2 SERPL-SCNC: 27 MMOL/L — SIGNIFICANT CHANGE UP (ref 22–31)
CREAT SERPL-MCNC: 1.33 MG/DL — HIGH (ref 0.5–1.3)
D DIMER BLD IA.RAPID-MCNC: 224 NG/ML DDU — SIGNIFICANT CHANGE UP
EGFR: 56 ML/MIN/1.73M2 — LOW
EOSINOPHIL # BLD AUTO: 0.13 K/UL — SIGNIFICANT CHANGE UP (ref 0–0.5)
EOSINOPHIL NFR BLD AUTO: 2.6 % — SIGNIFICANT CHANGE UP (ref 0–6)
FLUAV AG NPH QL: SIGNIFICANT CHANGE UP
FLUBV AG NPH QL: SIGNIFICANT CHANGE UP
GLUCOSE SERPL-MCNC: 91 MG/DL — SIGNIFICANT CHANGE UP (ref 70–99)
HCT VFR BLD CALC: 40.4 % — SIGNIFICANT CHANGE UP (ref 39–50)
HGB BLD-MCNC: 13.9 G/DL — SIGNIFICANT CHANGE UP (ref 13–17)
IMM GRANULOCYTES NFR BLD AUTO: 0.2 % — SIGNIFICANT CHANGE UP (ref 0–0.9)
INR BLD: 0.97 RATIO — SIGNIFICANT CHANGE UP (ref 0.88–1.16)
LYMPHOCYTES # BLD AUTO: 1.49 K/UL — SIGNIFICANT CHANGE UP (ref 1–3.3)
LYMPHOCYTES # BLD AUTO: 29.9 % — SIGNIFICANT CHANGE UP (ref 13–44)
MCHC RBC-ENTMCNC: 28.7 PG — SIGNIFICANT CHANGE UP (ref 27–34)
MCHC RBC-ENTMCNC: 34.4 G/DL — SIGNIFICANT CHANGE UP (ref 32–36)
MCV RBC AUTO: 83.5 FL — SIGNIFICANT CHANGE UP (ref 80–100)
MONOCYTES # BLD AUTO: 0.54 K/UL — SIGNIFICANT CHANGE UP (ref 0–0.9)
MONOCYTES NFR BLD AUTO: 10.8 % — SIGNIFICANT CHANGE UP (ref 2–14)
NEUTROPHILS # BLD AUTO: 2.8 K/UL — SIGNIFICANT CHANGE UP (ref 1.8–7.4)
NEUTROPHILS NFR BLD AUTO: 56.1 % — SIGNIFICANT CHANGE UP (ref 43–77)
NRBC # BLD: 0 /100 WBCS — SIGNIFICANT CHANGE UP (ref 0–0)
PLATELET # BLD AUTO: 160 K/UL — SIGNIFICANT CHANGE UP (ref 150–400)
POTASSIUM SERPL-MCNC: 3.8 MMOL/L — SIGNIFICANT CHANGE UP (ref 3.5–5.3)
POTASSIUM SERPL-SCNC: 3.8 MMOL/L — SIGNIFICANT CHANGE UP (ref 3.5–5.3)
PROT SERPL-MCNC: 7.4 GM/DL — SIGNIFICANT CHANGE UP (ref 6–8.3)
PROTHROM AB SERPL-ACNC: 11.6 SEC — SIGNIFICANT CHANGE UP (ref 10.5–13.4)
RBC # BLD: 4.84 M/UL — SIGNIFICANT CHANGE UP (ref 4.2–5.8)
RBC # FLD: 15 % — HIGH (ref 10.3–14.5)
SARS-COV-2 RNA SPEC QL NAA+PROBE: SIGNIFICANT CHANGE UP
SODIUM SERPL-SCNC: 142 MMOL/L — SIGNIFICANT CHANGE UP (ref 135–145)
TROPONIN I, HIGH SENSITIVITY RESULT: 21.5 NG/L — SIGNIFICANT CHANGE UP
TROPONIN I, HIGH SENSITIVITY RESULT: 22.5 NG/L — SIGNIFICANT CHANGE UP
WBC # BLD: 4.99 K/UL — SIGNIFICANT CHANGE UP (ref 3.8–10.5)
WBC # FLD AUTO: 4.99 K/UL — SIGNIFICANT CHANGE UP (ref 3.8–10.5)

## 2022-09-21 PROCEDURE — 99223 1ST HOSP IP/OBS HIGH 75: CPT

## 2022-09-21 PROCEDURE — 71045 X-RAY EXAM CHEST 1 VIEW: CPT | Mod: 26

## 2022-09-21 PROCEDURE — 99285 EMERGENCY DEPT VISIT HI MDM: CPT

## 2022-09-21 PROCEDURE — 99221 1ST HOSP IP/OBS SF/LOW 40: CPT

## 2022-09-21 PROCEDURE — 93306 TTE W/DOPPLER COMPLETE: CPT | Mod: 26

## 2022-09-21 PROCEDURE — 93010 ELECTROCARDIOGRAM REPORT: CPT

## 2022-09-21 RX ORDER — METOPROLOL TARTRATE 50 MG
50 TABLET ORAL DAILY
Refills: 0 | Status: DISCONTINUED | OUTPATIENT
Start: 2022-09-21 | End: 2022-09-23

## 2022-09-21 RX ORDER — FINASTERIDE 5 MG/1
5 TABLET, FILM COATED ORAL DAILY
Refills: 0 | Status: DISCONTINUED | OUTPATIENT
Start: 2022-09-21 | End: 2022-09-23

## 2022-09-21 RX ORDER — AMLODIPINE BESYLATE 2.5 MG/1
10 TABLET ORAL DAILY
Refills: 0 | Status: DISCONTINUED | OUTPATIENT
Start: 2022-09-21 | End: 2022-09-23

## 2022-09-21 RX ORDER — ALPRAZOLAM 0.25 MG
0.25 TABLET ORAL ONCE
Refills: 0 | Status: DISCONTINUED | OUTPATIENT
Start: 2022-09-21 | End: 2022-09-21

## 2022-09-21 RX ORDER — ATORVASTATIN CALCIUM 80 MG/1
80 TABLET, FILM COATED ORAL AT BEDTIME
Refills: 0 | Status: DISCONTINUED | OUTPATIENT
Start: 2022-09-21 | End: 2022-09-23

## 2022-09-21 RX ORDER — HYDRALAZINE HCL 50 MG
25 TABLET ORAL
Refills: 0 | Status: DISCONTINUED | OUTPATIENT
Start: 2022-09-21 | End: 2022-09-23

## 2022-09-21 RX ORDER — ASPIRIN/CALCIUM CARB/MAGNESIUM 324 MG
81 TABLET ORAL DAILY
Refills: 0 | Status: DISCONTINUED | OUTPATIENT
Start: 2022-09-21 | End: 2022-09-23

## 2022-09-21 RX ORDER — LANOLIN ALCOHOL/MO/W.PET/CERES
3 CREAM (GRAM) TOPICAL AT BEDTIME
Refills: 0 | Status: DISCONTINUED | OUTPATIENT
Start: 2022-09-21 | End: 2022-09-23

## 2022-09-21 RX ORDER — CLOPIDOGREL BISULFATE 75 MG/1
75 TABLET, FILM COATED ORAL DAILY
Refills: 0 | Status: DISCONTINUED | OUTPATIENT
Start: 2022-09-22 | End: 2022-09-23

## 2022-09-21 RX ORDER — NITROGLYCERIN 6.5 MG
0.4 CAPSULE, EXTENDED RELEASE ORAL
Refills: 0 | Status: DISCONTINUED | OUTPATIENT
Start: 2022-09-21 | End: 2022-09-23

## 2022-09-21 RX ORDER — TAMSULOSIN HYDROCHLORIDE 0.4 MG/1
0.4 CAPSULE ORAL AT BEDTIME
Refills: 0 | Status: DISCONTINUED | OUTPATIENT
Start: 2022-09-21 | End: 2022-09-23

## 2022-09-21 RX ORDER — HYDROCHLOROTHIAZIDE 25 MG
25 TABLET ORAL DAILY
Refills: 0 | Status: DISCONTINUED | OUTPATIENT
Start: 2022-09-21 | End: 2022-09-22

## 2022-09-21 RX ORDER — LOSARTAN POTASSIUM 100 MG/1
50 TABLET, FILM COATED ORAL DAILY
Refills: 0 | Status: DISCONTINUED | OUTPATIENT
Start: 2022-09-21 | End: 2022-09-22

## 2022-09-21 RX ORDER — ACETAMINOPHEN 500 MG
650 TABLET ORAL EVERY 6 HOURS
Refills: 0 | Status: DISCONTINUED | OUTPATIENT
Start: 2022-09-21 | End: 2022-09-23

## 2022-09-21 RX ADMIN — Medication 50 MILLIGRAM(S): at 17:08

## 2022-09-21 RX ADMIN — Medication 3 MILLIGRAM(S): at 21:06

## 2022-09-21 RX ADMIN — Medication 25 MILLIGRAM(S): at 13:47

## 2022-09-21 RX ADMIN — LOSARTAN POTASSIUM 50 MILLIGRAM(S): 100 TABLET, FILM COATED ORAL at 13:38

## 2022-09-21 RX ADMIN — AMLODIPINE BESYLATE 10 MILLIGRAM(S): 2.5 TABLET ORAL at 13:37

## 2022-09-21 RX ADMIN — ATORVASTATIN CALCIUM 80 MILLIGRAM(S): 80 TABLET, FILM COATED ORAL at 21:04

## 2022-09-21 RX ADMIN — FINASTERIDE 5 MILLIGRAM(S): 5 TABLET, FILM COATED ORAL at 17:08

## 2022-09-21 RX ADMIN — Medication 81 MILLIGRAM(S): at 13:38

## 2022-09-21 RX ADMIN — TAMSULOSIN HYDROCHLORIDE 0.4 MILLIGRAM(S): 0.4 CAPSULE ORAL at 21:04

## 2022-09-21 RX ADMIN — Medication 0.25 MILLIGRAM(S): at 13:53

## 2022-09-21 RX ADMIN — Medication 25 MILLIGRAM(S): at 17:08

## 2022-09-21 NOTE — ED ADULT NURSE NOTE - CHIEF COMPLAINT QUOTE
pt c/o chest pain, cough, lightheadedness started yesterday. pt also c/o sob. pt seen at urgent care yesterday and told to go to ED. hx: chf, high cholesterol, htn, borderline DM, cardiac stent

## 2022-09-21 NOTE — ED PROVIDER NOTE - OBJECTIVE STATEMENT
72 year old male with PMH of HTN, HLD TIA and CVA, CAD x 1 stent presenting to ED due to left sided chest pain since yesterday and had not taken any medications for pain - tightness nonradiating with symptom of SOB. Has had cough but no other URI symptoms. 72 year old male with PMH of HTN, HLD TIA and CVA, CAD x  2 stents and R carotid x1 stent presenting to ED due to left sided chest pain since yesterday and had not taken any medications for pain - tightness nonradiating with symptom of SOB. Has had cough but no other URI symptoms.

## 2022-09-21 NOTE — H&P ADULT - ASSESSMENT
72 years old male with h/o HTN, HLD, CAD s/p PCI, h/o CVA, s/p Rt transcarotid artery revascularization in 02/2022, BPH present to ED with complain of chest pain. Patient reported chest pain started yesterday afternoon at rest, located in right chest, more like soreness, 5-6/10 intensity and associated with slight SOB. Pain lasted whole day and improved upon ED arrival. Reported 1 episode of dizziness. No diaphoresis.   Hemodynamically stable, afebrile, sat well at RA. EKG with NSR VR 65, new TWI in V4,5,6. No leukocytosis, Plt 160, ddimer 224, hsTnT 22.5-->21.5, K 3.8, Cr 1.33, glucose 91, COVID negative    Admitted with chest pain

## 2022-09-21 NOTE — CONSULT NOTE ADULT - ASSESSMENT
71yo man with a PMH of HTN, HLD, pre-DM, CAD s/p PCI 2015, 2018 at [facility], acute CVA 2/2022 -> found to have R ICA disease and acute R MCA-[place] territory stroke s/p Rt transcarotid artery revascularization in 02/2022; presented to the ED with complaints of chest pain/L pressure associated with slight SOB. Pain lasted whole day and improved upon ED arrival. Reported 1 episode of dizziness.   ddimer 224  hsTnT 22.5-->21.5  COVID negative  ECG:  sinus 65bpm; 1st degree AVB, anterolateral TWIs (NEW from 2/2022)  -cont asa/statin/metoprolol  -2D echo  -carotid dopplers  -likely cath in AM

## 2022-09-21 NOTE — H&P ADULT - PROBLEM SELECTOR PLAN 1
present with chest pain and new EKG changes  h/o CAD s/p PCI with 2 stents  High risk chest pain  Serial trop/Ck/CKMB  telemetry monitoring, ECHO, NST  Cardiology consulted- Dr Carroll  Nitroglycerin prn   continue aspirin, plavix, BB  NPO after midnight

## 2022-09-21 NOTE — CONSULT NOTE ADULT - SUBJECTIVE AND OBJECTIVE BOX
CARDIOLOGY CONSULT NOTE    Patient is a 72y Male with a known history of :  Chest pain [R07.9]    Abnormal EKG [R94.31]    CAD (coronary artery disease) [I25.10]    History of CVA (cerebrovascular accident) [Z86.73]    Benign essential HTN [I10]    Hyperlipidemia, unspecified [E78.5]    BPH (benign prostatic hyperplasia) [N40.0]      HPI:  71yo man with a PMH of HTN, HLD, pre-DM, CAD s/p PCI 2015, 2018 at [facility], acute CVA 2/2022 -> found to have R ICA disease and acute R MCA-MLITON territory stroke s/p Rt transcarotid artery revascularization in 02/2022; presented to the ED with complaints of chest pain associated with slight SOB. Pain lasted whole day and improved upon ED arrival. Reported 1 episode of dizziness.   ddimer 224  hsTnT 22.5-->21.5  COVID negative  ECG:  sinus 65bpm; 1st degree AVB, anterolateral TWIs (NEW from 2/2022)    REVIEW OF SYSTEMS:  CONSTITUTIONAL: No fever, weight loss, or fatigue  EYES: No eye pain, visual disturbances, or discharge  ENMT:  No difficulty hearing, tinnitus, vertigo; No sinus or throat pain  NECK: No pain or stiffness  BREASTS: No pain, masses, or nipple discharge  RESPIRATORY: No cough, wheezing, chills or hemoptysis; No shortness of breath  CARDIOVASCULAR: No chest pain, palpitations, dizziness, or leg swelling  GASTROINTESTINAL: No abdominal or epigastric pain. No nausea, vomiting, or hematemesis; No diarrhea or constipation. No melena or hematochezia.  GENITOURINARY: No dysuria, frequency, hematuria, or incontinence  NEUROLOGICAL: No headaches, memory loss, loss of strength, numbness, or tremors  SKIN: No itching, burning, rashes, or lesions   LYMPH NODES: No enlarged glands  ENDOCRINE: No heat or cold intolerance; No hair loss  MUSCULOSKELETAL: No joint pain or swelling; No muscle, back, or extremity pain  PSYCHIATRIC: No depression, anxiety, mood swings, or difficulty sleeping  HEME/LYMPH: No easy bruising, or bleeding gums  ALLERGY AND IMMUNOLOGIC: No hives or eczema    MEDICATIONS  (STANDING):  amLODIPine   Tablet 10 milliGRAM(s) Oral daily  aspirin enteric coated 81 milliGRAM(s) Oral daily  atorvastatin 80 milliGRAM(s) Oral at bedtime  finasteride 5 milliGRAM(s) Oral daily  hydrALAZINE 25 milliGRAM(s) Oral two times a day  hydrochlorothiazide 25 milliGRAM(s) Oral daily  losartan 50 milliGRAM(s) Oral daily  metoprolol succinate ER 50 milliGRAM(s) Oral daily  tamsulosin 0.4 milliGRAM(s) Oral at bedtime    MEDICATIONS  (PRN):  acetaminophen     Tablet .. 650 milliGRAM(s) Oral every 6 hours PRN Mild Pain (1 - 3), Moderate Pain (4 - 6)  melatonin 3 milliGRAM(s) Oral at bedtime PRN Insomnia  nitroglycerin     SubLingual 0.4 milliGRAM(s) SubLingual every 5 minutes PRN Chest Pain      ALLERGIES: No Known Allergies      FAMILY HISTORY:  FHx: prostate cancer (Father)        PHYSICAL EXAMINATION:  -----------------------------  T(C): 36.7 (09-21-22 @ 11:27), Max: 36.7 (09-21-22 @ 11:27)  HR: 98 (09-21-22 @ 13:35) (65 - 98)  BP: 186/60 (09-21-22 @ 13:35) (139/73 - 186/60)  RR: 17 (09-21-22 @ 11:27) (17 - 18)  SpO2: 99% (09-21-22 @ 11:27) (97% - 99%)    Constitutional: well developed, normal appearance, well groomed, well nourished, no deformities and no acute distress.   Eyes: the conjunctiva exhibited no abnormalities and the eyelids demonstrated no xanthelasmas.   HEENT: normal oral mucosa, no oral pallor and no oral cyanosis.   Neck: normal jugular venous A waves present, normal jugular venous V waves present and no jugular venous velazquez A waves.   Pulmonary: no respiratory distress, normal respiratory rhythm and effort, no accessory muscle use and lungs were clear to auscultation bilaterally.   Cardiovascular: heart rate and rhythm were normal, normal S1 and S2 and no murmur, gallop, rub, heave or thrill are present.   Abdomen: soft, non-tender, no hepato-splenomegaly and no abdominal mass palpated.   Musculoskeletal: the gait could not be assessed..   Extremities: no clubbing of the fingernails, no localized cyanosis, no petechial hemorrhages and no ischemic changes.   Skin: normal skin color and pigmentation, no rash, no venous stasis, no skin lesions, no skin ulcer and no xanthoma was observed.   Psychiatric: oriented to person, place, and time, the affect was normal, the mood was normal and not feeling anxious.     LABS:   --------  09-21    142  |  109<H>  |  18  ----------------------------<  91  3.8   |  27  |  1.33<H>    Ca    9.3      21 Sep 2022 09:14    TPro  7.4  /  Alb  3.4  /  TBili  0.6  /  DBili  x   /  AST  41<H>  /  ALT  19  /  AlkPhos  77  09-21                         13.9   4.99  )-----------( 160      ( 21 Sep 2022 09:14 )             40.4     PT/INR - ( 21 Sep 2022 09:14 )   PT: 11.6 sec;   INR: 0.97 ratio         PTT - ( 21 Sep 2022 09:14 )  PTT:33.1 sec            RADIOLOGY:  -----------------    ECG:  sinus 65bpm; 1st degree AVB, anterolateral TWIs     e< from: TTE Echo Complete w/o Contrast w/ Doppler (01.06.22 @ 15:01) >  Summary:   1. Left ventricular ejection fraction, by visual estimation, is 55 to   60%.   2. Normal global left ventricular systolic function.   3. Spectral Doppler shows impaired relaxation pattern of left   ventricular myocardial filling (Grade I diastolic dysfunction).   4. Normal right ventricular size and function.   5. Moderately enlarged left atrium.   6. Structurally normal mitral valve, with normal leaflet excursion.   7. Sclerotic aortic valve with normal opening.   8. Increased relative wall thickness with normal mass index consistent   with left ventricular concentric remodeling.      [id number] Andre Matute MD, [facility]  Electronically signed on 1/6/2022 at 7:17:43 PM    < end of copied text >

## 2022-09-21 NOTE — PATIENT PROFILE ADULT - FALL HARM RISK - RISK INTERVENTIONS

## 2022-09-21 NOTE — PATIENT PROFILE ADULT - NSPROGENBLOODRESTRICT_GEN_A_NUR
none Complex Repair And Ftsg Text: The defect edges were debeveled with a #15 scalpel blade.  The primary defect was closed partially with a complex linear closure.  Given the location of the defect, shape of the defect and the proximity to free margins a full thickness skin graft was deemed most appropriate to repair the remaining defect.  The graft was trimmed to fit the size of the remaining defect.  The graft was then placed in the primary defect, oriented appropriately, and sutured into place.

## 2022-09-21 NOTE — H&P ADULT - HISTORY OF PRESENT ILLNESS
72 years old male with h/o HTN, HLD, CAD s/p PCI, h/o CVA, s/p Rt transcarotid artery revascularization in 02/2022, BPH present to ED with complain of chest pain. Patient reported chest pain started yesterday afternoon at rest, located in right chest, more like soreness, 5-6/10 intensity and associated with slight SOB. Pain lasted whole day and improved upon ED arrival. Reported 1 episode of dizziness. No diaphoresis.   Hemodynamically stable, afebrile, sat well at RA. EKG with NSR VR 65, new TWI in V4,5,6. No leukocytosis, Plt 160, ddimer 224, hsTnT 22.5-->21.5, K 3.8, Cr 1.33, glucose 91, COVID negative    SH:  occasional marijuana use  FH: Siblings-HTN/DM, mother- heart attack at age of 53, father- stroke

## 2022-09-21 NOTE — ED ADULT NURSE NOTE - OBJECTIVE STATEMENT
ptA&Ox4 PMH CVA, TIA HTN, Hyperlipidemia, CAD, present today with  c/o chest pressure, cough, lightheadedness started yesterday x2 days. pt also c/o sob. pt seen at urgent care yesterday and told to go to ED. Denies nausea/vomiting.

## 2022-09-21 NOTE — ED ADULT TRIAGE NOTE - CHIEF COMPLAINT QUOTE
pt c/o chest pain, cough, lightheadedness started yesterday. pt also c/o sob. pt sent to ED by urgent care for follow up. hx: chf, high cholesterol, htn, borderline DM, cardiac stent pt c/o chest pain, cough, lightheadedness started yesterday. pt also c/o sob. pt seen at urgent care yesterday and told to go to ED. hx: chf, high cholesterol, htn, borderline DM, cardiac stent

## 2022-09-21 NOTE — H&P ADULT - PROBLEM SELECTOR PLAN 4
h/o CVA, s/p Rt transcarotid artery revascularization in 02/2022  continue DAPT and high intensity statin

## 2022-09-21 NOTE — ED PROVIDER NOTE - CLINICAL SUMMARY MEDICAL DECISION MAKING FREE TEXT BOX
pt with chest pain EKG with changes but noted change present seen by patient's cardiologist last week - pt otherwise to place on cardiac obs. First enzyme negative

## 2022-09-21 NOTE — H&P ADULT - NSHPPHYSICALEXAM_GEN_ALL_CORE
CONSTITUTIONAL: Well developed, well nourished, alert and cooperative, no acute distress  EYES: PERRL, no scleral icterus  ENT: Mucosa moist, tongue normal.  NECK: Neck supple, trachea midline, non-tender  CARDIAC: Normal S1 and S2. Regular rate and rhythms. No murmurs. No Pedal edema. Peripheral pulses intact  LUNGS: Equal air entry both lungs. No rales, rhonchi, wheezing. Normal respiratory effort.   ABDOMEN: Soft, nondistended, nontender. No guarding or rebound tenderness. No hepatomegaly or splenomegaly. Bowel sound normal.  MUSCULOSKELETAL: Normocephalic, atraumatic. No significant deformity or joint abnormality  NEUROLOGICAL: No gross motor or sensory deficits.  SKIN: no lesions or eruptions. Normal turgor  PSYCHIATRIC: A&O x 3, appropriate mood and affect

## 2022-09-22 ENCOUNTER — TRANSCRIPTION ENCOUNTER (OUTPATIENT)
Age: 72
End: 2022-09-22

## 2022-09-22 DIAGNOSIS — N18.30 CHRONIC KIDNEY DISEASE, STAGE 3 UNSPECIFIED: ICD-10-CM

## 2022-09-22 DIAGNOSIS — R73.03 PREDIABETES: ICD-10-CM

## 2022-09-22 DIAGNOSIS — Z29.9 ENCOUNTER FOR PROPHYLACTIC MEASURES, UNSPECIFIED: ICD-10-CM

## 2022-09-22 LAB
A1C WITH ESTIMATED AVERAGE GLUCOSE RESULT: 6.1 % — HIGH (ref 4–5.6)
ALBUMIN SERPL ELPH-MCNC: 3.2 G/DL — LOW (ref 3.3–5)
ALP SERPL-CCNC: 84 U/L — SIGNIFICANT CHANGE UP (ref 40–120)
ALT FLD-CCNC: 18 U/L — SIGNIFICANT CHANGE UP (ref 12–78)
ANION GAP SERPL CALC-SCNC: 6 MMOL/L — SIGNIFICANT CHANGE UP (ref 5–17)
AST SERPL-CCNC: 26 U/L — SIGNIFICANT CHANGE UP (ref 15–37)
BILIRUB SERPL-MCNC: 0.6 MG/DL — SIGNIFICANT CHANGE UP (ref 0.2–1.2)
BUN SERPL-MCNC: 20 MG/DL — SIGNIFICANT CHANGE UP (ref 7–23)
CALCIUM SERPL-MCNC: 9.4 MG/DL — SIGNIFICANT CHANGE UP (ref 8.5–10.1)
CHLORIDE SERPL-SCNC: 105 MMOL/L — SIGNIFICANT CHANGE UP (ref 96–108)
CHOLEST SERPL-MCNC: 176 MG/DL — SIGNIFICANT CHANGE UP
CK MB BLD-MCNC: 0.4 % — SIGNIFICANT CHANGE UP (ref 0–3.5)
CK MB CFR SERPL CALC: 1.1 NG/ML — SIGNIFICANT CHANGE UP (ref 0.5–3.6)
CK SERPL-CCNC: 270 U/L — SIGNIFICANT CHANGE UP (ref 26–308)
CO2 SERPL-SCNC: 30 MMOL/L — SIGNIFICANT CHANGE UP (ref 22–31)
CREAT SERPL-MCNC: 1.33 MG/DL — HIGH (ref 0.5–1.3)
EGFR: 56 ML/MIN/1.73M2 — LOW
ESTIMATED AVERAGE GLUCOSE: 128 MG/DL — HIGH (ref 68–114)
GLUCOSE SERPL-MCNC: 103 MG/DL — HIGH (ref 70–99)
HCT VFR BLD CALC: 43 % — SIGNIFICANT CHANGE UP (ref 39–50)
HDLC SERPL-MCNC: 35 MG/DL — LOW
HGB BLD-MCNC: 14.4 G/DL — SIGNIFICANT CHANGE UP (ref 13–17)
LIPID PNL WITH DIRECT LDL SERPL: 91 MG/DL — SIGNIFICANT CHANGE UP
MAGNESIUM SERPL-MCNC: 2.2 MG/DL — SIGNIFICANT CHANGE UP (ref 1.6–2.6)
MCHC RBC-ENTMCNC: 27.9 PG — SIGNIFICANT CHANGE UP (ref 27–34)
MCHC RBC-ENTMCNC: 33.5 G/DL — SIGNIFICANT CHANGE UP (ref 32–36)
MCV RBC AUTO: 83.2 FL — SIGNIFICANT CHANGE UP (ref 80–100)
NON HDL CHOLESTEROL: 140 MG/DL — HIGH
NRBC # BLD: 0 /100 WBCS — SIGNIFICANT CHANGE UP (ref 0–0)
PHOSPHATE SERPL-MCNC: 3.2 MG/DL — SIGNIFICANT CHANGE UP (ref 2.5–4.5)
PLATELET # BLD AUTO: 169 K/UL — SIGNIFICANT CHANGE UP (ref 150–400)
POTASSIUM SERPL-MCNC: 3 MMOL/L — LOW (ref 3.5–5.3)
POTASSIUM SERPL-SCNC: 3 MMOL/L — LOW (ref 3.5–5.3)
PROT SERPL-MCNC: 7.3 GM/DL — SIGNIFICANT CHANGE UP (ref 6–8.3)
RBC # BLD: 5.17 M/UL — SIGNIFICANT CHANGE UP (ref 4.2–5.8)
RBC # FLD: 15.1 % — HIGH (ref 10.3–14.5)
SODIUM SERPL-SCNC: 141 MMOL/L — SIGNIFICANT CHANGE UP (ref 135–145)
TRIGL SERPL-MCNC: 246 MG/DL — HIGH
TROPONIN I, HIGH SENSITIVITY RESULT: 18.2 NG/L — SIGNIFICANT CHANGE UP
WBC # BLD: 5.09 K/UL — SIGNIFICANT CHANGE UP (ref 3.8–10.5)
WBC # FLD AUTO: 5.09 K/UL — SIGNIFICANT CHANGE UP (ref 3.8–10.5)

## 2022-09-22 PROCEDURE — 99232 SBSQ HOSP IP/OBS MODERATE 35: CPT

## 2022-09-22 RX ORDER — LOSARTAN POTASSIUM 100 MG/1
1 TABLET, FILM COATED ORAL
Qty: 0 | Refills: 0 | DISCHARGE
Start: 2022-09-22

## 2022-09-22 RX ORDER — HYDRALAZINE HCL 50 MG
1 TABLET ORAL
Qty: 0 | Refills: 0 | DISCHARGE
Start: 2022-09-22

## 2022-09-22 RX ORDER — TAMSULOSIN HYDROCHLORIDE 0.4 MG/1
1 CAPSULE ORAL
Qty: 0 | Refills: 0 | DISCHARGE
Start: 2022-09-22

## 2022-09-22 RX ORDER — FINASTERIDE 5 MG/1
1 TABLET, FILM COATED ORAL
Qty: 0 | Refills: 0 | DISCHARGE

## 2022-09-22 RX ORDER — METOPROLOL TARTRATE 50 MG
1 TABLET ORAL
Qty: 0 | Refills: 0 | DISCHARGE

## 2022-09-22 RX ORDER — ATORVASTATIN CALCIUM 80 MG/1
1 TABLET, FILM COATED ORAL
Qty: 0 | Refills: 0 | DISCHARGE
Start: 2022-09-22

## 2022-09-22 RX ORDER — TAMSULOSIN HYDROCHLORIDE 0.4 MG/1
1 CAPSULE ORAL
Qty: 0 | Refills: 0 | DISCHARGE

## 2022-09-22 RX ORDER — CLOPIDOGREL BISULFATE 75 MG/1
1 TABLET, FILM COATED ORAL
Qty: 0 | Refills: 0 | DISCHARGE

## 2022-09-22 RX ORDER — FINASTERIDE 5 MG/1
1 TABLET, FILM COATED ORAL
Qty: 0 | Refills: 0 | DISCHARGE
Start: 2022-09-22

## 2022-09-22 RX ORDER — ROSUVASTATIN CALCIUM 5 MG/1
1 TABLET ORAL
Qty: 0 | Refills: 0 | DISCHARGE

## 2022-09-22 RX ORDER — CLOPIDOGREL BISULFATE 75 MG/1
1 TABLET, FILM COATED ORAL
Qty: 0 | Refills: 0 | DISCHARGE
Start: 2022-09-22

## 2022-09-22 RX ORDER — METOPROLOL TARTRATE 50 MG
1 TABLET ORAL
Qty: 0 | Refills: 0 | DISCHARGE
Start: 2022-09-22

## 2022-09-22 RX ORDER — LANOLIN ALCOHOL/MO/W.PET/CERES
1 CREAM (GRAM) TOPICAL
Qty: 0 | Refills: 0 | DISCHARGE
Start: 2022-09-22

## 2022-09-22 RX ORDER — AMLODIPINE BESYLATE 2.5 MG/1
1 TABLET ORAL
Qty: 0 | Refills: 0 | DISCHARGE

## 2022-09-22 RX ORDER — AMLODIPINE BESYLATE 2.5 MG/1
1 TABLET ORAL
Qty: 0 | Refills: 0 | DISCHARGE
Start: 2022-09-22

## 2022-09-22 RX ORDER — LOSARTAN POTASSIUM 100 MG/1
100 TABLET, FILM COATED ORAL DAILY
Refills: 0 | Status: DISCONTINUED | OUTPATIENT
Start: 2022-09-23 | End: 2022-09-23

## 2022-09-22 RX ORDER — HYDRALAZINE HCL 50 MG
1 TABLET ORAL
Qty: 0 | Refills: 0 | DISCHARGE

## 2022-09-22 RX ORDER — ACETAMINOPHEN 500 MG
2 TABLET ORAL
Qty: 0 | Refills: 0 | DISCHARGE
Start: 2022-09-22

## 2022-09-22 RX ORDER — LOSARTAN POTASSIUM 100 MG/1
1 TABLET, FILM COATED ORAL
Qty: 0 | Refills: 0 | DISCHARGE

## 2022-09-22 RX ORDER — ASPIRIN/CALCIUM CARB/MAGNESIUM 324 MG
1 TABLET ORAL
Qty: 0 | Refills: 0 | DISCHARGE

## 2022-09-22 RX ORDER — POTASSIUM CHLORIDE 20 MEQ
20 PACKET (EA) ORAL
Refills: 0 | Status: COMPLETED | OUTPATIENT
Start: 2022-09-22 | End: 2022-09-22

## 2022-09-22 RX ORDER — ASPIRIN/CALCIUM CARB/MAGNESIUM 324 MG
1 TABLET ORAL
Qty: 0 | Refills: 0 | DISCHARGE
Start: 2022-09-22

## 2022-09-22 RX ADMIN — Medication 81 MILLIGRAM(S): at 13:57

## 2022-09-22 RX ADMIN — FINASTERIDE 5 MILLIGRAM(S): 5 TABLET, FILM COATED ORAL at 12:04

## 2022-09-22 RX ADMIN — Medication 25 MILLIGRAM(S): at 17:07

## 2022-09-22 RX ADMIN — TAMSULOSIN HYDROCHLORIDE 0.4 MILLIGRAM(S): 0.4 CAPSULE ORAL at 21:20

## 2022-09-22 RX ADMIN — Medication 20 MILLIEQUIVALENT(S): at 12:04

## 2022-09-22 RX ADMIN — AMLODIPINE BESYLATE 10 MILLIGRAM(S): 2.5 TABLET ORAL at 05:59

## 2022-09-22 RX ADMIN — Medication 20 MILLIEQUIVALENT(S): at 13:56

## 2022-09-22 RX ADMIN — Medication 20 MILLIEQUIVALENT(S): at 16:01

## 2022-09-22 RX ADMIN — LOSARTAN POTASSIUM 50 MILLIGRAM(S): 100 TABLET, FILM COATED ORAL at 05:59

## 2022-09-22 RX ADMIN — Medication 25 MILLIGRAM(S): at 05:59

## 2022-09-22 RX ADMIN — Medication 3 MILLIGRAM(S): at 21:19

## 2022-09-22 RX ADMIN — CLOPIDOGREL BISULFATE 75 MILLIGRAM(S): 75 TABLET, FILM COATED ORAL at 13:57

## 2022-09-22 RX ADMIN — Medication 25 MILLIGRAM(S): at 06:00

## 2022-09-22 RX ADMIN — ATORVASTATIN CALCIUM 80 MILLIGRAM(S): 80 TABLET, FILM COATED ORAL at 21:19

## 2022-09-22 NOTE — PROGRESS NOTE ADULT - SUBJECTIVE AND OBJECTIVE BOX
Patient is a 72y old  Male who presents with a chief complaint of chest pain, new EKG changes (22 Sep 2022 15:53)      INTERVAL HPI/ OVERNIGHT EVENTS: Pt was seen and examined at bedside today, No significant overnight events, pt denies any CP, SOB, palpitations or dizziness      MEDICATIONS  (STANDING):  amLODIPine   Tablet 10 milliGRAM(s) Oral daily  aspirin enteric coated 81 milliGRAM(s) Oral daily  atorvastatin 80 milliGRAM(s) Oral at bedtime  clopidogrel Tablet 75 milliGRAM(s) Oral daily  finasteride 5 milliGRAM(s) Oral daily  hydrALAZINE 25 milliGRAM(s) Oral two times a day  hydrochlorothiazide 25 milliGRAM(s) Oral daily  losartan 50 milliGRAM(s) Oral daily  metoprolol succinate ER 50 milliGRAM(s) Oral daily  tamsulosin 0.4 milliGRAM(s) Oral at bedtime    MEDICATIONS  (PRN):  acetaminophen     Tablet .. 650 milliGRAM(s) Oral every 6 hours PRN Mild Pain (1 - 3), Moderate Pain (4 - 6)  melatonin 3 milliGRAM(s) Oral at bedtime PRN Insomnia  nitroglycerin     SubLingual 0.4 milliGRAM(s) SubLingual every 5 minutes PRN Chest Pain      Allergies    No Known Allergies    Intolerances        REVIEW OF SYSTEMS:    Unable to examine due to [ ] Encephalopathy [ ] Advanced Dementia [ ] Expressive Aphasia [ ] Non-verbal patient    CONSTITUTIONAL: No fever, NO generalized weakness/Fatigue, No weight loss  EYES: No eye pain, visual disturbances, or discharge  ENMT:  No difficulty hearing, tinnitus, vertigo; No sinus or throat pain  NECK: No pain or stiffness  RESPIRATORY: No shortness of breath,  cough, wheezing, sputum or hemoptysis   CARDIOVASCULAR: No chest pain, palpitations, or leg swelling  GASTROINTESTINAL: No abdominal pain. No nausea, vomiting, diarrhea or constipation. No melena or hematochezia.  GENITOURINARY: No dysuria, frequency, hematuria, or incontinence  NEUROLOGICAL: No headaches, Dizziness, memory loss, loss of strength, numbness, or tremors  SKIN: No itching, burning, rashes, or lesions   MUSCULOSKELETAL: No joint pain or swelling; No muscle, back, or extremity pain  PSYCHIATRIC: No depression, anxiety, mood swings, or difficulty sleeping  HEME/LYMPH: No easy bruising, or bleeding gums      Vital Signs Last 24 Hrs  T(C): 36.3 (22 Sep 2022 16:36), Max: 36.6 (22 Sep 2022 00:14)  T(F): 97.3 (22 Sep 2022 16:36), Max: 97.8 (22 Sep 2022 00:14)  HR: 65 (22 Sep 2022 16:36) (57 - 70)  BP: 152/90 (22 Sep 2022 16:36) (136/67 - 160/89)  BP(mean): 100 (22 Sep 2022 04:35) (90 - 100)  RR: 18 (22 Sep 2022 16:36) (17 - 18)  SpO2: 97% (22 Sep 2022 11:48) (94% - 97%)    Parameters below as of 22 Sep 2022 16:36  Patient On (Oxygen Delivery Method): room air        PHYSICAL EXAM:  GENERAL: NAD, well-developed, well-groomed  HEAD:  Atraumatic, Normocephalic  EYES: conjunctiva and sclera clear  ENMT: Moist mucous membranes  NECK: Supple, No JVD, Normal thyroid  CHEST/LUNG: Clear to Auscultation bilaterally; No rales, rhonchi, wheezing, or rubs  HEART: Regular rate and rhythm; No murmurs, rubs, or gallops  ABDOMEN: Soft, Nontender, Nondistended; Bowel sounds present  EXTREMITIES:  2+ Peripheral Pulses, No clubbing, cyanosis, or edema  SKIN: No rashes or lesions  NERVOUS SYSTEM:  Alert & Oriented X3, Good concentration; Motor Strength 5/5 B/L upper and lower extremities    LABS:                        14.4   5.09  )-----------( 169      ( 22 Sep 2022 08:00 )             43.0     09-22    141  |  105  |  20  ----------------------------<  103<H>  3.0<L>   |  30  |  1.33<H>    Ca    9.4      22 Sep 2022 08:00  Phos  3.2     09-22  Mg     2.2     09-22    TPro  7.3  /  Alb  3.2<L>  /  TBili  0.6  /  DBili  x   /  AST  26  /  ALT  18  /  AlkPhos  84  09-22    PT/INR - ( 21 Sep 2022 09:14 )   PT: 11.6 sec;   INR: 0.97 ratio         PTT - ( 21 Sep 2022 09:14 )  PTT:33.1 sec    CAPILLARY BLOOD GLUCOSE              RADIOLOGY & ADDITIONAL TESTS:          Imaging Personally Reviewed:  [ ] YES  [ ] NO    Consultant(s) Notes Reviewed:  [ ] YES  [ ] NO    Care Discussed with Consultants/Other Providers [x ] YES  [ ] NO

## 2022-09-22 NOTE — PROGRESS NOTE ADULT - ASSESSMENT
Called patient, no answer and Voicemail is full 72 years old male with h/o HTN, HLD, CAD s/p PCI, h/o CVA, s/p Rt transcarotid artery revascularization in 02/2022, BPH present to ED with complain of chest pain. Patient reported chest pain started yesterday afternoon at rest, located in right chest, more like soreness, 5-6/10 intensity and associated with slight SOB. Pain lasted whole day and improved upon ED arrival. Reported 1 episode of dizziness. No diaphoresis.   Hemodynamically stable, afebrile, sat well at RA. EKG with NSR VR 65, new TWI in V4,5,6. No leukocytosis, Plt 160, ddimer 224, hsTnT 22.5-->21.5, K 3.8, Cr 1.33, glucose 91, COVID negative    Admitted with chest pain

## 2022-09-22 NOTE — PROGRESS NOTE ADULT - PROBLEM SELECTOR PLAN 3
continue with Norvasc, Hydralazine, Losartan, and metoprolol   will d/c HCTZ and will increase losartan

## 2022-09-22 NOTE — DISCHARGE NOTE PROVIDER - HOSPITAL COURSE
2 years old male with h/o HTN, HLD, CAD s/p PCI, h/o CVA, s/p Rt transcarotid artery revascularization in 02/2022, BPH present to ED with complain of chest pain. Patient reported chest pain started yesterday afternoon at rest, located in right chest, more like soreness, 5-6/10 intensity and associated with slight SOB. Pain lasted whole day and improved upon ED arrival. Reported 1 episode of dizziness. No diaphoresis.   Hemodynamically stable, afebrile, sat well at RA. EKG with NSR VR 65, new TWI in V4,5,6. No leukocytosis, Plt 160, ddimer 224, hsTnT 22.5-->21.5, K 3.8, Cr 1.33, glucose 91, COVID negative    Problem/Plan - 1:  ·  Problem: Chest pain.   ·  Plan: present with chest pain and new EKG changes  h/o CAD s/p PCI with 2 stents  High risk chest pain  Serial trop/Ck/CKMB  telemetry monitoring, ECHO, NST  Cardiology consulted- Dr Carroll  Nitroglycerin prn   continue aspirin, plavix, BB  9/22 transfer to [facility] for cardica cath w/ Dr. Cardenas    Problem/Plan - 2:  ·  Problem: Abnormal EKG.   ·  Plan: EKG with NSR VR 65, new TWI in V4,5,6  Cardiology consulted  telemetry monitoring  ECHO  NST.    Problem/Plan - 3:  ·  Problem: CAD (coronary artery disease).   ·  Plan: CAD s/p PCI  continue aspirin, palvix, metoprolol ER, losartan 50mg.    Problem/Plan - 4:  ·  Problem: History of CVA (cerebrovascular accident).   ·  Plan: h/o CVA, s/p Rt transcarotid artery revascularization in 02/2022  continue DAPT and high intensity statin.    Problem/Plan - 5:  ·  Problem: Benign essential HTN.   ·  Plan: Monitor BP and titrate BP meds.    Problem/Plan - 6:  ·  Problem: Hyperlipidemia, unspecified.   ·  Plan: continue high intensity statin.    Problem/Plan - 7:  ·  Problem: BPH (benign prostatic hyperplasia).   ·  Plan: continue finasteride, flomax.   2 years old male with h/o HTN, HLD, CAD s/p PCI, h/o CVA, s/p Rt transcarotid artery revascularization in 02/2022, BPH present to ED with complain of chest pain. Patient reported chest pain started yesterday afternoon at rest, located in right chest, more like soreness, 5-6/10 intensity and associated with slight SOB. Pain lasted whole day and improved upon ED arrival. Reported 1 episode of dizziness. No diaphoresis.   Hemodynamically stable, afebrile, sat well at RA. EKG with NSR VR 65, new TWI in V4,5,6. No leukocytosis, Plt 160, ddimer 224, hsTnT 22.5-->21.5, K 3.8, Cr 1.33, glucose 91, COVID negative    Problem/Plan - 1:  ·  Problem: Chest pain.   ·  Plan: present with chest pain and new EKG changes  h/o CAD s/p PCI with 2 stents  High risk chest pain  Serial trop/Ck/CKMB  telemetry monitoring, ECHO, NST  Cardiology consulted- Dr Carroll  Nitroglycerin prn   continue aspirin, plavix, BB  transfer to [facility] for cardica cath w/ Dr. Cardenas    Problem/Plan - 2:  ·  Problem: Abnormal EKG.   ·  Plan: EKG with NSR VR 65, new TWI in V4,5,6  Cardiology consulted  telemetry monitoring  ECHO  NST.    Problem/Plan - 3:  ·  Problem: CAD (coronary artery disease).   ·  Plan: CAD s/p PCI  continue aspirin, palvix, metoprolol ER, losartan 50mg.    Problem/Plan - 4:  ·  Problem: History of CVA (cerebrovascular accident).   ·  Plan: h/o CVA, s/p Rt transcarotid artery revascularization in 02/2022  continue DAPT and high intensity statin.    Problem/Plan - 5:  ·  Problem: Benign essential HTN.   ·  Plan: Monitor BP and titrate BP meds.    Problem/Plan - 6:  ·  Problem: Hyperlipidemia, unspecified.   ·  Plan: continue high intensity statin.    Problem/Plan - 7:  ·  Problem: BPH (benign prostatic hyperplasia).   ·  Plan: continue finasteride, flomax.   72 years old male with h/o HTN, HLD, CAD s/p PCI, h/o CVA, s/p Rt transcarotid artery revascularization in 02/2022, BPH present to ED with complain of chest pain. Patient reported chest pain started yesterday afternoon at rest, located in right chest, more like soreness, 5-6/10 intensity and associated with slight SOB. Pain lasted whole day and improved upon ED arrival. Reported 1 episode of dizziness. No diaphoresis.   Hemodynamically stable, afebrile, sat well at RA. EKG with NSR VR 65, new TWI in V4,5,6. No leukocytosis, Plt 160, ddimer 224, hsTnT 22.5-->21.5, K 3.8, Cr 1.33, glucose 91, COVID negative    Problem/Plan - 1:  ·  Problem: Chest pain.   ·  Plan: present with chest pain and new EKG changes  h/o CAD s/p PCI with 2 stents  High risk chest pain  Serial trop/Ck/CKMB  telemetry monitoring, ECHO, NST  Cardiology consulted- Dr Carroll  Nitroglycerin prn   continue aspirin, plavix, BB  transfer to [facility] for cardica cath w/ Dr. Cardenas    Problem/Plan - 2:  ·  Problem: Abnormal EKG.   ·  Plan: EKG with NSR VR 65, new TWI in V4,5,6  Cardiology consulted  telemetry monitoring  ECHO  NST.    Problem/Plan - 3:  ·  Problem: CAD (coronary artery disease).   ·  Plan: CAD s/p PCI  continue aspirin, palvix, metoprolol ER, losartan 50mg.    Problem/Plan - 4:  ·  Problem: History of CVA (cerebrovascular accident).   ·  Plan: h/o CVA, s/p Rt transcarotid artery revascularization in 02/2022  continue DAPT and high intensity statin.    Problem/Plan - 5:  ·  Problem: Benign essential HTN.   ·  Plan: Monitor BP and titrate BP meds.    Problem/Plan - 6:  ·  Problem: Hyperlipidemia, unspecified.   ·  Plan: continue high intensity statin.    Problem/Plan - 7:  ·  Problem: BPH (benign prostatic hyperplasia).   ·  Plan: continue finasteride, flomax.

## 2022-09-22 NOTE — DISCHARGE NOTE PROVIDER - NSDCCPCAREPLAN_GEN_ALL_CORE_FT
PRINCIPAL DISCHARGE DIAGNOSIS  Diagnosis: Chest pain  Assessment and Plan of Treatment: transfer to [facility] for cardiac cath

## 2022-09-22 NOTE — DISCHARGE NOTE PROVIDER - CARE PROVIDER_API CALL
Miguel Ángel Cardenas)  Cardiology; Interventional Cardiology  [address], [place] [id number]  Phone: [phone]  Fax: [phone]  Follow Up Time:

## 2022-09-22 NOTE — DISCHARGE NOTE PROVIDER - NSDCMRMEDTOKEN_GEN_ALL_CORE_FT
acetaminophen 325 mg oral tablet: 2 tab(s) orally every 6 hours, As needed, Mild Pain (1 - 3), Moderate Pain (4 - 6)  amLODIPine 10 mg oral tablet: 1 tab(s) orally once a day  aspirin 81 mg oral delayed release tablet: 1 tab(s) orally once a day  atorvastatin 80 mg oral tablet: 1 tab(s) orally once a day (at bedtime)  clopidogrel 75 mg oral tablet: 1 tab(s) orally once a day  finasteride 5 mg oral tablet: 1 tab(s) orally once a day  Florastor 250 mg oral capsule: 1 cap(s) orally 2 times a day   hydrALAZINE 25 mg oral tablet: 1 tab(s) orally 2 times a day  hydroCHLOROthiazide 25 mg oral tablet: 1 tab(s) orally once a day  losartan 50 mg oral tablet: 1 tab(s) orally once a day  melatonin 3 mg oral tablet: 1 tab(s) orally once a day (at bedtime), As needed, Insomnia  metoprolol succinate 50 mg oral tablet, extended release: 1 tab(s) orally once a day  tamsulosin 0.4 mg oral capsule: 1 cap(s) orally once a day (at bedtime)   acetaminophen 325 mg oral tablet: 2 tab(s) orally every 6 hours, As needed, Mild Pain (1 - 3), Moderate Pain (4 - 6)  amLODIPine 10 mg oral tablet: 1 tab(s) orally once a day  HOME  aspirin 81 mg oral delayed release tablet: 1 tab(s) orally once a day  home  atorvastatin 80 mg oral tablet: 1 tab(s) orally once a day (at bedtime)  [facility]  clopidogrel 75 mg oral tablet: 1 tab(s) orally once a day  HOME  Florastor 250 mg oral capsule: 1 cap(s) orally 2 times a day   losartan 100 mg oral tablet: 1 tab(s) orally once a day  metoprolol succinate 50 mg oral tablet, extended release: 1 tab(s) orally once a day  tamsulosin 0.4 mg oral capsule: 1 cap(s) orally once a day (at bedtime)  HOME

## 2022-09-23 ENCOUNTER — TRANSCRIPTION ENCOUNTER (OUTPATIENT)
Age: 72
End: 2022-09-23

## 2022-09-23 ENCOUNTER — INPATIENT (INPATIENT)
Facility: HOSPITAL | Age: 72
LOS: 0 days | Discharge: HOME | DRG: 247 | End: 2022-09-24
Attending: INTERNAL MEDICINE | Admitting: INTERNAL MEDICINE
Payer: COMMERCIAL

## 2022-09-23 VITALS
SYSTOLIC BLOOD PRESSURE: 147 MMHG | TEMPERATURE: 98 F | HEIGHT: 66 IN | WEIGHT: 188.94 LBS | RESPIRATION RATE: 16 BRPM | OXYGEN SATURATION: 99 % | HEART RATE: 82 BPM | DIASTOLIC BLOOD PRESSURE: 76 MMHG

## 2022-09-23 VITALS
DIASTOLIC BLOOD PRESSURE: 81 MMHG | TEMPERATURE: 98 F | SYSTOLIC BLOOD PRESSURE: 152 MMHG | RESPIRATION RATE: 18 BRPM | WEIGHT: 192.24 LBS | HEART RATE: 67 BPM | OXYGEN SATURATION: 95 %

## 2022-09-23 DIAGNOSIS — I20.0 UNSTABLE ANGINA: ICD-10-CM

## 2022-09-23 DIAGNOSIS — Z98.890 OTHER SPECIFIED POSTPROCEDURAL STATES: Chronic | ICD-10-CM

## 2022-09-23 LAB
ANION GAP SERPL CALC-SCNC: 10 MMOL/L — SIGNIFICANT CHANGE UP (ref 5–17)
BUN SERPL-MCNC: 23 MG/DL — SIGNIFICANT CHANGE UP (ref 7–23)
CALCIUM SERPL-MCNC: 10.1 MG/DL — SIGNIFICANT CHANGE UP (ref 8.4–10.5)
CHLORIDE SERPL-SCNC: 105 MMOL/L — SIGNIFICANT CHANGE UP (ref 96–108)
CO2 SERPL-SCNC: 28 MMOL/L — SIGNIFICANT CHANGE UP (ref 22–31)
CREAT SERPL-MCNC: 1.29 MG/DL — SIGNIFICANT CHANGE UP (ref 0.5–1.3)
EGFR: 58 ML/MIN/1.73M2 — LOW
GLUCOSE SERPL-MCNC: 102 MG/DL — HIGH (ref 70–99)
HCT VFR BLD CALC: 45.2 % — SIGNIFICANT CHANGE UP (ref 39–50)
HGB BLD-MCNC: 15.1 G/DL — SIGNIFICANT CHANGE UP (ref 13–17)
MCHC RBC-ENTMCNC: 28.4 PG — SIGNIFICANT CHANGE UP (ref 27–34)
MCHC RBC-ENTMCNC: 33.4 GM/DL — SIGNIFICANT CHANGE UP (ref 32–36)
MCV RBC AUTO: 85.1 FL — SIGNIFICANT CHANGE UP (ref 80–100)
NRBC # BLD: 0 /100 WBCS — SIGNIFICANT CHANGE UP (ref 0–0)
PLATELET # BLD AUTO: 182 K/UL — SIGNIFICANT CHANGE UP (ref 150–400)
POTASSIUM SERPL-MCNC: 3.4 MMOL/L — LOW (ref 3.5–5.3)
POTASSIUM SERPL-SCNC: 3.4 MMOL/L — LOW (ref 3.5–5.3)
RBC # BLD: 5.31 M/UL — SIGNIFICANT CHANGE UP (ref 4.2–5.8)
RBC # FLD: 15.1 % — HIGH (ref 10.3–14.5)
SODIUM SERPL-SCNC: 143 MMOL/L — SIGNIFICANT CHANGE UP (ref 135–145)
WBC # BLD: 5.47 K/UL — SIGNIFICANT CHANGE UP (ref 3.8–10.5)
WBC # FLD AUTO: 5.47 K/UL — SIGNIFICANT CHANGE UP (ref 3.8–10.5)

## 2022-09-23 PROCEDURE — 93010 ELECTROCARDIOGRAM REPORT: CPT | Mod: 77

## 2022-09-23 PROCEDURE — 99152 MOD SED SAME PHYS/QHP 5/>YRS: CPT

## 2022-09-23 PROCEDURE — 99239 HOSP IP/OBS DSCHRG MGMT >30: CPT

## 2022-09-23 PROCEDURE — 93454 CORONARY ARTERY ANGIO S&I: CPT | Mod: 26,59

## 2022-09-23 PROCEDURE — 92928 PRQ TCAT PLMT NTRAC ST 1 LES: CPT | Mod: LC

## 2022-09-23 PROCEDURE — 93010 ELECTROCARDIOGRAM REPORT: CPT

## 2022-09-23 RX ORDER — HYDRALAZINE HCL 50 MG
25 TABLET ORAL
Refills: 0 | Status: DISCONTINUED | OUTPATIENT
Start: 2022-09-23 | End: 2022-09-24

## 2022-09-23 RX ORDER — POTASSIUM CHLORIDE 20 MEQ
1 PACKET (EA) ORAL
Qty: 0 | Refills: 0 | DISCHARGE

## 2022-09-23 RX ORDER — ROSUVASTATIN CALCIUM 5 MG/1
0 TABLET ORAL
Qty: 0 | Refills: 0 | DISCHARGE

## 2022-09-23 RX ORDER — LOSARTAN POTASSIUM 100 MG/1
1 TABLET, FILM COATED ORAL
Qty: 0 | Refills: 0 | DISCHARGE

## 2022-09-23 RX ORDER — HYDRALAZINE HCL 50 MG
1 TABLET ORAL
Qty: 0 | Refills: 0 | DISCHARGE

## 2022-09-23 RX ORDER — LANOLIN ALCOHOL/MO/W.PET/CERES
5 CREAM (GRAM) TOPICAL ONCE
Refills: 0 | Status: COMPLETED | OUTPATIENT
Start: 2022-09-23 | End: 2022-09-23

## 2022-09-23 RX ORDER — AMLODIPINE BESYLATE 2.5 MG/1
10 TABLET ORAL DAILY
Refills: 0 | Status: DISCONTINUED | OUTPATIENT
Start: 2022-09-23 | End: 2022-09-24

## 2022-09-23 RX ORDER — POTASSIUM CHLORIDE 20 MEQ
20 PACKET (EA) ORAL ONCE
Refills: 0 | Status: COMPLETED | OUTPATIENT
Start: 2022-09-23 | End: 2022-09-23

## 2022-09-23 RX ORDER — LOSARTAN POTASSIUM 100 MG/1
1 TABLET, FILM COATED ORAL
Qty: 0 | Refills: 0 | DISCHARGE
Start: 2022-09-23

## 2022-09-23 RX ORDER — HYDROCHLOROTHIAZIDE 25 MG
25 TABLET ORAL DAILY
Refills: 0 | Status: DISCONTINUED | OUTPATIENT
Start: 2022-09-23 | End: 2022-09-24

## 2022-09-23 RX ORDER — ATORVASTATIN CALCIUM 80 MG/1
80 TABLET, FILM COATED ORAL AT BEDTIME
Refills: 0 | Status: DISCONTINUED | OUTPATIENT
Start: 2022-09-23 | End: 2022-09-24

## 2022-09-23 RX ORDER — LOSARTAN POTASSIUM 100 MG/1
50 TABLET, FILM COATED ORAL DAILY
Refills: 0 | Status: DISCONTINUED | OUTPATIENT
Start: 2022-09-23 | End: 2022-09-24

## 2022-09-23 RX ORDER — CLOPIDOGREL BISULFATE 75 MG/1
75 TABLET, FILM COATED ORAL DAILY
Refills: 0 | Status: DISCONTINUED | OUTPATIENT
Start: 2022-09-24 | End: 2022-09-24

## 2022-09-23 RX ORDER — METOPROLOL TARTRATE 50 MG
1 TABLET ORAL
Qty: 0 | Refills: 0 | DISCHARGE

## 2022-09-23 RX ORDER — METOPROLOL TARTRATE 50 MG
50 TABLET ORAL DAILY
Refills: 0 | Status: DISCONTINUED | OUTPATIENT
Start: 2022-09-23 | End: 2022-09-23

## 2022-09-23 RX ORDER — METOPROLOL TARTRATE 50 MG
100 TABLET ORAL DAILY
Refills: 0 | Status: DISCONTINUED | OUTPATIENT
Start: 2022-09-23 | End: 2022-09-24

## 2022-09-23 RX ORDER — ASPIRIN/CALCIUM CARB/MAGNESIUM 324 MG
81 TABLET ORAL DAILY
Refills: 0 | Status: DISCONTINUED | OUTPATIENT
Start: 2022-09-23 | End: 2022-09-24

## 2022-09-23 RX ORDER — TAMSULOSIN HYDROCHLORIDE 0.4 MG/1
0.4 CAPSULE ORAL AT BEDTIME
Refills: 0 | Status: DISCONTINUED | OUTPATIENT
Start: 2022-09-23 | End: 2022-09-24

## 2022-09-23 RX ADMIN — ATORVASTATIN CALCIUM 80 MILLIGRAM(S): 80 TABLET, FILM COATED ORAL at 21:45

## 2022-09-23 RX ADMIN — Medication 5 MILLIGRAM(S): at 21:45

## 2022-09-23 RX ADMIN — Medication 20 MILLIEQUIVALENT(S): at 15:30

## 2022-09-23 RX ADMIN — Medication 25 MILLIGRAM(S): at 15:30

## 2022-09-23 RX ADMIN — Medication 25 MILLIGRAM(S): at 19:18

## 2022-09-23 RX ADMIN — AMLODIPINE BESYLATE 10 MILLIGRAM(S): 2.5 TABLET ORAL at 05:50

## 2022-09-23 RX ADMIN — TAMSULOSIN HYDROCHLORIDE 0.4 MILLIGRAM(S): 0.4 CAPSULE ORAL at 21:45

## 2022-09-23 RX ADMIN — Medication 50 MILLIGRAM(S): at 05:50

## 2022-09-23 RX ADMIN — LOSARTAN POTASSIUM 100 MILLIGRAM(S): 100 TABLET, FILM COATED ORAL at 05:50

## 2022-09-23 RX ADMIN — Medication 25 MILLIGRAM(S): at 05:50

## 2022-09-23 NOTE — PATIENT PROFILE ADULT - FALL HARM RISK - RISK INTERVENTIONS

## 2022-09-23 NOTE — H&P CARDIOLOGY - HISTORY OF PRESENT ILLNESS
73yo man with a PMH of HTN, HLD, pre-DM, CAD s/p PCI 2015, 2018 at [facility], acute CVA 2/2022 -> found to have R ICA disease and acute R MCA-MILTON territory stroke s/p Rt transcarotid artery revascularization in 02/2022, presented to the ED with complaints of chest pain associated with slight SOB. Pain lasted whole day and improved upon ED arrival. Reported 1 episode of dizziness.   denies implanted cardiac devices- transferred to [facility] for cardiac cath.   ddimer 224  hsTnT 22.5-->21.5  COVID negative  ECG:  sinus 65bpm; 1st degree AVB, anterolateral TWIs (NEW from 2/2022)

## 2022-09-24 ENCOUNTER — TRANSCRIPTION ENCOUNTER (OUTPATIENT)
Age: 72
End: 2022-09-24

## 2022-09-24 VITALS
TEMPERATURE: 98 F | DIASTOLIC BLOOD PRESSURE: 85 MMHG | OXYGEN SATURATION: 99 % | HEART RATE: 96 BPM | SYSTOLIC BLOOD PRESSURE: 153 MMHG | RESPIRATION RATE: 18 BRPM

## 2022-09-24 LAB
ANION GAP SERPL CALC-SCNC: 12 MMOL/L — SIGNIFICANT CHANGE UP (ref 5–17)
BUN SERPL-MCNC: 26 MG/DL — HIGH (ref 7–23)
CALCIUM SERPL-MCNC: 9.6 MG/DL — SIGNIFICANT CHANGE UP (ref 8.4–10.5)
CHLORIDE SERPL-SCNC: 105 MMOL/L — SIGNIFICANT CHANGE UP (ref 96–108)
CO2 SERPL-SCNC: 24 MMOL/L — SIGNIFICANT CHANGE UP (ref 22–31)
CREAT SERPL-MCNC: 1.2 MG/DL — SIGNIFICANT CHANGE UP (ref 0.5–1.3)
EGFR: 63 ML/MIN/1.73M2 — SIGNIFICANT CHANGE UP
GLUCOSE SERPL-MCNC: 128 MG/DL — HIGH (ref 70–99)
HCT VFR BLD CALC: 40.4 % — SIGNIFICANT CHANGE UP (ref 39–50)
HGB BLD-MCNC: 13.8 G/DL — SIGNIFICANT CHANGE UP (ref 13–17)
MCHC RBC-ENTMCNC: 29.1 PG — SIGNIFICANT CHANGE UP (ref 27–34)
MCHC RBC-ENTMCNC: 34.2 GM/DL — SIGNIFICANT CHANGE UP (ref 32–36)
MCV RBC AUTO: 85.2 FL — SIGNIFICANT CHANGE UP (ref 80–100)
NRBC # BLD: 0 /100 WBCS — SIGNIFICANT CHANGE UP (ref 0–0)
PLATELET # BLD AUTO: 183 K/UL — SIGNIFICANT CHANGE UP (ref 150–400)
POTASSIUM SERPL-MCNC: 3.5 MMOL/L — SIGNIFICANT CHANGE UP (ref 3.5–5.3)
POTASSIUM SERPL-SCNC: 3.5 MMOL/L — SIGNIFICANT CHANGE UP (ref 3.5–5.3)
RBC # BLD: 4.74 M/UL — SIGNIFICANT CHANGE UP (ref 4.2–5.8)
RBC # FLD: 15.2 % — HIGH (ref 10.3–14.5)
SODIUM SERPL-SCNC: 141 MMOL/L — SIGNIFICANT CHANGE UP (ref 135–145)
WBC # BLD: 6.72 K/UL — SIGNIFICANT CHANGE UP (ref 3.8–10.5)
WBC # FLD AUTO: 6.72 K/UL — SIGNIFICANT CHANGE UP (ref 3.8–10.5)

## 2022-09-24 PROCEDURE — C1887: CPT

## 2022-09-24 PROCEDURE — 85027 COMPLETE CBC AUTOMATED: CPT

## 2022-09-24 PROCEDURE — 93454 CORONARY ARTERY ANGIO S&I: CPT | Mod: 59

## 2022-09-24 PROCEDURE — C1769: CPT

## 2022-09-24 PROCEDURE — C1894: CPT

## 2022-09-24 PROCEDURE — C9600: CPT | Mod: LC

## 2022-09-24 PROCEDURE — C1874: CPT

## 2022-09-24 PROCEDURE — 80048 BASIC METABOLIC PNL TOTAL CA: CPT

## 2022-09-24 PROCEDURE — 93005 ELECTROCARDIOGRAM TRACING: CPT

## 2022-09-24 RX ORDER — ATORVASTATIN CALCIUM 80 MG/1
1 TABLET, FILM COATED ORAL
Qty: 30 | Refills: 0
Start: 2022-09-24 | End: 2022-10-23

## 2022-09-24 RX ORDER — METOPROLOL TARTRATE 50 MG
1 TABLET ORAL
Qty: 0 | Refills: 0 | DISCHARGE

## 2022-09-24 RX ORDER — CLOPIDOGREL BISULFATE 75 MG/1
1 TABLET, FILM COATED ORAL
Qty: 90 | Refills: 3
Start: 2022-09-24 | End: 2023-09-18

## 2022-09-24 RX ORDER — METOPROLOL TARTRATE 50 MG
1 TABLET ORAL
Qty: 30 | Refills: 0
Start: 2022-09-24 | End: 2022-10-23

## 2022-09-24 RX ORDER — ROSUVASTATIN CALCIUM 5 MG/1
0 TABLET ORAL
Qty: 0 | Refills: 0 | DISCHARGE

## 2022-09-24 RX ORDER — POTASSIUM CHLORIDE 20 MEQ
40 PACKET (EA) ORAL ONCE
Refills: 0 | Status: COMPLETED | OUTPATIENT
Start: 2022-09-24 | End: 2022-09-24

## 2022-09-24 RX ADMIN — CLOPIDOGREL BISULFATE 75 MILLIGRAM(S): 75 TABLET, FILM COATED ORAL at 07:14

## 2022-09-24 RX ADMIN — Medication 40 MILLIEQUIVALENT(S): at 07:19

## 2022-09-24 RX ADMIN — Medication 81 MILLIGRAM(S): at 07:15

## 2022-09-24 NOTE — DISCHARGE NOTE PROVIDER - CARE PROVIDER_API CALL
Miguel Ángel Cardenas)  Cardiology; Interventional Cardiology  [address] [place] [address], [place] [id number]  Phone: [phone]  Fax: [phone]  Follow Up Time: 2 weeks

## 2022-09-24 NOTE — DISCHARGE NOTE PROVIDER - NSDCCPTREATMENT_GEN_ALL_CORE_FT
PRINCIPAL PROCEDURE  Procedure: Left heart cardiac catheterization  Findings and Treatment: Study Date:     2022   Name:           MARY JO WILSON   :            1950   (72 years)   Cath Lab Report    Diagnostic Cardiologist:       Franck Swenson MD   Interventional Cardiologist:   Franck Swenson MD   Fellow:                        Roxana Aguirre MD   Referring Physician:           Miguel Ángel Cardenas MD   Procedures Performed   1.    Arterial Access - Right Radial   2.    Diagnostic Coronary Angiography   3.    PCI: JANINA   Indications:               ACS greater than 24 hours   Lab Visit Indication:      ACS greater than 24 hours   PCI Status:                elective   Conclusions:   Severe stenosis of the proximal circumfelx s/p JANINA    Recommendations:   Cont DAPT x 12 months    Cont optimal medical therapy    Cont follow up with Cardiology    Acute complication:    No complications

## 2022-09-24 NOTE — DISCHARGE NOTE NURSING/CASE MANAGEMENT/SOCIAL WORK - PATIENT PORTAL LINK FT
You can access the FollowMyHealth Patient Portal offered by [facility] by registering at the following website: [email or web address]. By joining [employer]’s FollowMyHealth portal, you will also be able to view your health information using other applications (apps) compatible with our system.

## 2022-09-24 NOTE — DISCHARGE NOTE PROVIDER - HOSPITAL COURSE
71yo man with a PMH of HTN, HLD, pre-DM, CAD s/p PCI 2015, 2018 at [facility], acute CVA 2/2022 -> found to have R ICA disease and acute R MCA-MILTON territory stroke s/p Rt transcarotid artery revascularization in 02/2022, presented to the ED with complaints of chest pain associated with slight SOB. Pain lasted whole day and improved upon ED arrival. Reported 1 episode of dizziness.   denies implanted cardiac devices- transferred to [facility] for cardiac cath.   ddimer 224  hsTnT 22.5-->21.5  COVID negative  ECG:  sinus 65bpm; 1st degree AVB, anterolateral TWIs (NEW from 2/2022)    9/24 s/p LHC with JANINA x1 to LCx via RRA, site without hematoma/bleeding. Patient without complaint, hemodynamically stable. Pending discharge in AM 73yo man with a PMH of HTN, HLD, pre-DM, CAD s/p PCI 2015, 2018 at [facility], acute CVA 2/2022 -> found to have R ICA disease and acute R MCA-MILTON territory stroke s/p Rt transcarotid artery revascularization in 02/2022, presented to the ED with complaints of chest pain associated with slight SOB. Pain lasted whole day and improved upon ED arrival. Reported 1 episode of dizziness.   denies implanted cardiac devices- transferred to [facility] for cardiac cath.   ddimer 224  hsTnT 22.5-->21.5  COVID negative  ECG:  sinus 65bpm; 1st degree AVB, anterolateral TWIs (NEW from 2/2022)    9/24 s/p LHC with JANINA x1 to LCx via RRA, site without hematoma/bleeding. Patient without complaint, hemodynamically stable. Pending discharge in AM

## 2022-09-24 NOTE — DISCHARGE NOTE PROVIDER - NSDCFUADDINST_GEN_ALL_CORE_FT
Wound Care:   the day AFTER your procedure remove bandage GENTLY, and clean using  mild soap and gentle warm, water stream, pat dry. leave OPEN to air. YOU MAY SHOWER   DO NOT apply lotions, creams, ointments, powder, perfumes to your incision site  DO NOT SOAK your site for 1 week (no baths, no pools, no tubs, etc...)  Check  your groin and /or wrist daily. A small amount of bruising, and soreness are normal    ACTIVITY: for 24 hours   - DO NOT DRIVE  - DO NOT make any important decisions or sign legal documents   - DO NOT operate heavy machineries  - you may resume sexual activity in 48 hours, unless otherwise instructed by your cardiologist     If your procedure was done through the WRIST: for the NEXT 3 DAYS:  - avoid pushing, pulling, with that affected wrist   - avoid repeated movement of that hand and wrist (eg: typing, hammering)  - DO NOT LIFT anything more than 5 lbs     If your procedure was done through the GROIN: for the NEXT 5 DAYS  - Limit climbing stairs, DO NOT soak in bathtub or pool  - no strenous activities, pushing, pulling, straining  - Do not lift anything 10lbs or heavier     MEDICATION:   take your medications as explained (see discharge paperwork)   If you received a STENT, you will be taking antiplatelet medications to KEEP YOUR STENT OPEN ( eg: Aspirin, Plavix, Brilinta, Effient, etc).  Take as prescribed DO NOT STOP taking them without consulting with your cardiologist first.     Follow heart healthy diet reccomended by your doctor, , if you smoke STOP SMOKING ( may call [phone] for center of tobacco control if you need assistance)     CALL your doctor to make appointment in 2 WEEKS     ***CALL YOUR DOCTOR***  if you experience: fever, chills, body aches, or severe pain, swelling, redness, heat or yellow discharge at incision site  If you experience Bleeding or excruciating pain at the procedural site, swelling ( golf ball size) at your procedural site  If you experience CHEST PAIN  If you experience extremity numbness, tingling, temperature change ( of your procedural site)   If you are unable to reach your doctor, you may contact:   -Cardiology Office at [facility] at [phone] or   - [facility] [phone]  - [facility] [phone]

## 2022-09-24 NOTE — DISCHARGE NOTE PROVIDER - NSDCCPCAREPLAN_GEN_ALL_CORE_FT
PRINCIPAL DISCHARGE DIAGNOSIS  Diagnosis: CAD (coronary artery disease)  Assessment and Plan of Treatment: Low salt, low fat diet.   Weight management.   Take medications as prescribed.    No smoking.  Follow up appointments with your doctor(s)  as instructed.  Continue your medications. Do not stop Aspirin or Plavix unless instructed by your cardiologist.  No heavy lifting or pushing/pulling or strenuous activity with procedure arm for 2 weeks. No driving for 2 days. No sex for 1 week.  You may shower 24 hours following procedure but no soaking of your wrist in water (such as in a pool, sink, or tub) for 1 week. Check wrist site for bleeding and/or swelling daily following procedure. Call your doctor/cardiologist immediately for bleeding or swelling or if you have increased/persistent pain or drainage at the wrist site or if you have numbness, tingling or blue or white coloring of your hand or fingers.  Follow up with your cardiologist in 1- 2 weeks. You may call [facility] at [phone] or [phone] after office hours and weekends with any questions or concerns following your procedure.      SECONDARY DISCHARGE DIAGNOSES  Diagnosis: HLD (hyperlipidemia)  Assessment and Plan of Treatment: Your LDL cholesterol will be less than 70mg/dL  Continue with your cholesterol medications. Eat a heart healthy diet that is low in saturated fats and salt, and includes whole grains, fruits, vegetables and lean protein; exercise regularly (consult with your physician or cardiologist first); maintain a heart healthy weight; if you smoke - quit (A resource to help you stop smoking is the [facility] – phone number [phone].). Continue to follow with your primary physician or cardiologist.

## 2022-09-24 NOTE — DISCHARGE NOTE PROVIDER - NSDCMRMEDTOKEN_GEN_ALL_CORE_FT
acetaminophen 325 mg oral tablet: 2 tab(s) orally every 6 hours, As needed, Mild Pain (1 - 3), Moderate Pain (4 - 6)  amLODIPine 10 mg oral tablet: 1 tab(s) orally once a day  HOME  aspirin 81 mg oral delayed release tablet: 1 tab(s) orally once a day  home  atorvastatin 80 mg oral tablet: 1 tab(s) orally once a day (at bedtime)  [facility]  clopidogrel 75 mg oral tablet: 1 tab(s) orally once a day  HOME  hydrALAZINE 25 mg oral tablet: 1 tab(s) orally 2 times a day  hydroCHLOROthiazide 25 mg oral tablet: 1 tab(s) orally once a day  losartan 50 mg oral tablet: 1 tab(s) orally once a day  home  metoprolol succinate 100 mg oral tablet, extended release: 1 tab(s) orally once a day  potassium chloride 10 mEq oral capsule, extended release: 1 cap(s) orally once a day  tamsulosin 0.4 mg oral capsule: 1 cap(s) orally once a day (at bedtime)  HOME

## 2022-09-24 NOTE — PROGRESS NOTE ADULT - ASSESSMENT
HPI:  71yo man with a PMH of HTN, HLD, pre-DM, CAD s/p PCI 2015, 2018 at [facility], acute CVA 2/2022 -> found to have R ICA disease and acute R MCA-MILTON territory stroke s/p Rt transcarotid artery revascularization in 02/2022, presented to the ED with complaints of chest pain associated with slight SOB. Pain lasted whole day and improved upon ED arrival. Reported 1 episode of dizziness.   denies implanted cardiac devices- transferred to [facility] for cardiac cath.   ddimer 224  hsTnT 22.5-->21.5  COVID negative  ECG:  sinus 65bpm; 1st degree AVB, anterolateral TWIs (NEW from 2/2022)    s/p cardiac cath on 9/24 with stent to LCx  tele interpretation SR 70s with no events overnight    IF groin:  ( right/left) groin w/o bleeding or hematoma.  soft, non tender.  Pulses in the (right/left) lower extremity are (palpable/audible by doppler/absent).   Denies chest pain, denies groin/leg/foot: pain, numbness or tingling       - Reviewed and reinforced with patient:  wound care instructions, activities dos and donts, medication compliance specifically antiplatelet therapy given stent/s.    - Patient aware to take DAPT  as prescribed and DO NOT STOP taking without consulting cardiologist first or STENT/s WILL CLOSE  - Reviewed and reinforced with patient:  site complications ( eg: bleeding, excruciating pain at the procedural site, large sweliing-golf ball size-  extremity numbness, tingling, temperature change), or CHEST PAIN; pt aware that if any of those occur he/she must call cardiologist IMMEDIATELY or 911 or go to nearest emergency room   - Reviewed and reinforced a heart healthy diet, Smoking Cessation  - Patient verbalizes understanding of ALL OF THE ABOVE, and gives positive feedback       cont DAPT- aspirin and plavix  cont antihypertensives ( specify BB, ACE, etc.), check flow sheet and report BP/HR trend- amlodipine, metoprolol, hydralazine   cont statin   Keep Mg >2 K >4  f/u appt in 2 weeks post dc with oupt cardiologist  for all  general cardiology questions please contact patient's primary cards team   all other care as per primary medicine  team       Gabriel Pina [facility]  Invasive Cardiology  Ext [phone]

## 2022-09-24 NOTE — PROGRESS NOTE ADULT - SUBJECTIVE AND OBJECTIVE BOX
[facility] INVASIVE CARDIOLOGY- (Leela Madrigal, Raphael, Deandra, Toby, Jacquelin, Niraj, [place], Elio)   CARDIAC CATH LAB, ACP TEAM   [phone]    CHIEF COMPLAINT: Patient is a 72y old  Male who presents with a chief complaint of chest pain    HPI:  71yo man with a PMH of HTN, HLD, pre-DM, CAD s/p PCI ,  at [facility], acute CVA 2022 -> found to have R ICA disease and acute R MCA-MILTON territory stroke s/p Rt transcarotid artery revascularization in 2022, presented to the ED with complaints of chest pain associated with slight SOB. Pain lasted whole day and improved upon ED arrival. Reported 1 episode of dizziness.   denies implanted cardiac devices- transferred to [facility] for cardiac cath.   ddimer 224  hsTnT 22.5-->21.5  COVID negative  ECG:  sinus 65bpm; 1st degree AVB, anterolateral TWIs (NEW from 2022)      Subjective/Observations: patient seen and examined.  denies chest pain, dyspena, dizziness, palpitations, N&V, HA      Review of Systems all WNL except below indicated:    Constitutional: [ ] Fever [ ] Chills [ ] Fatigue [ ] Weight change   HEENT: [ ] Blurred vision [ ] Eye Pain [ ] Headache [ ] Runny nose [ ] Sore Throat   Respiratory: [ ] Cough [ ] Wheezing [ ] Shortness of breath  Cardiovascular: [ ] Chest Pain [ ] Palpitations [ ] GOLDSMITH [ ] PND [ ] Orthopnea  Gastrointestinal: [ ] Abdominal Pain [ ] Diarrhea [ ] Constipation [ ] Hemorrhoids [ ] Nausea [ ] Vomiting  Genitourinary: [ ] Nocturia [ ] Dysuria [ ] Incontinence  Extremities: [ ] Swelling [ ] Joint Pain  Neurologic: [ ] Focal deficit [ ] Paresthesias [ ] Syncope  Lymphatic: [ ] Swelling [ ] Lymphadenopathy   Skin: [ ] Rash [ ] Ecchymoses [ ] Wounds [ ] Lesions  Psychiatry: [ ] Depression [ ] Suicidal/Homicidal Ideation [ ] Anxiety [ ] Sleep Disturbances  [ ] 10 point review of systems is otherwise negative except as mentioned above            [ ]Unable to obtain      PAST MEDICAL & SURGICAL HISTORY:  Hypertension    Hypercholesterolemia    CAD (coronary artery disease)    Stented coronary artery    CVA (cerebral vascular accident)  2021    History of coronary angiogram  2015 stent x1   2018 stent x1      MEDICATIONS  (STANDING):  amLODIPine   Tablet 10 milliGRAM(s) Oral daily  aspirin enteric coated 81 milliGRAM(s) Oral daily  atorvastatin 80 milliGRAM(s) Oral at bedtime  clopidogrel Tablet 75 milliGRAM(s) Oral daily  hydrALAZINE 25 milliGRAM(s) Oral two times a day  hydrochlorothiazide 25 milliGRAM(s) Oral daily  losartan 50 milliGRAM(s) Oral daily  metoprolol succinate  milliGRAM(s) Oral daily  tamsulosin 0.4 milliGRAM(s) Oral at bedtime    MEDICATIONS  (PRN):    Allergies    No Known Allergies    Intolerances      Vital Signs Last 24 Hrs  T(C): 37.1 (23 Sep 2022 14:35), Max: 37.1 (23 Sep 2022 14:35)  T(F): 98.7 (23 Sep 2022 14:35), Max: 98.7 (23 Sep 2022 14:35)  HR: 67 (23 Sep 2022 19:20) (67 - 88)  BP: 168/81 (23 Sep 2022 19:20) (136/115 - 171/83)  BP(mean): 101 (23 Sep 2022 18:20) (99 - 153)  RR: 18 (23 Sep 2022 19:20) (16 - 18)  SpO2: 99% (23 Sep 2022 19:20) (95% - 99%)    Parameters below as of 23 Sep 2022 19:20  Patient On (Oxygen Delivery Method): room air      I&O's Summary    23 Sep 2022 07:01  -  24 Sep 2022 01:32  --------------------------------------------------------  IN: 300 mL / OUT: 600 mL / NET: -300 mL      Weight (kg): 85.7 ( @ 09:12)    FOCUSED PHYSICAL EXAM:  Pulmonary: Non-labored, breath sounds are clear bilaterally, No wheezing, rales or rhonchi  Cardiovascular: Regular, S1 and S2, No murmurs, rubs, gallops or clicks  cath site: right radial stable w/o bleeding or hematoma, soft, + pulses     LABS: All Labs Reviewed:                        15.1   5.47  )-----------( 182      ( 23 Sep 2022 10:17 )             45.2                         14.4   5.09  )-----------( 169      ( 22 Sep 2022 08:00 )             43.0                         13.9   4.99  )-----------( 160      ( 21 Sep 2022 09:14 )             40.4     23 Sep 2022 10:17    143    |  105    |  23     ----------------------------<  102    3.4     |  28     |  1.29   22 Sep 2022 08:00    141    |  105    |  20     ----------------------------<  103    3.0     |  30     |  1.33   21 Sep 2022 09:14    142    |  109    |  18     ----------------------------<  91     3.8     |  27     |  1.33     Ca    10.1       23 Sep 2022 10:17  Ca    9.4        22 Sep 2022 08:00  Ca    9.3        21 Sep 2022 09:14  Phos  3.2       22 Sep 2022 08:00  Mg     2.2       22 Sep 2022 08:00    TPro  7.3    /  Alb  3.2    /  TBili  0.6    /  DBili  x      /  AST  26     /  ALT  18     /  AlkPhos  84     22 Sep 2022 08:00  TPro  7.4    /  Alb  3.4    /  TBili  0.6    /  DBili  x      /  AST  41     /  ALT  19     /  AlkPhos  77     21 Sep 2022 09:14      CARDIAC MARKERS ( 22 Sep 2022 08:00 )  x     / x     / 270 U/L / x     / 1.1 ng/mL    RESULTS:    TELE interpretation:     ECG:  Ventricular Rate 78 BPM    Atrial Rate 78 BPM    P-R Interval 198 ms    QRS Duration 84 ms    Q-T Interval 348 ms    QTC Calculation(Bazett) 396 ms    P Axis 46 degrees    R Axis -18 degrees    T Axis 60 degrees    Diagnosis Line NORMAL SINUS RHYTHM WITH SINUS ARRHYTHMIA  MINIMAL VOLTAGE CRITERIA FOR LVH, MAY BE NORMAL VARIANT ( R in aVL )  NONSPECIFIC T WAVE ABNORMALITY  ABNORMAL ECG  WHEN COMPARED WITH ECG OF 14-OCT-2005 11:33,  SIGNIFICANT CHANGES HAVE OCCURRED  Confirmed by MD SUJATHA, YOSI ([id number]) on 2022 5:51:36 PM    ECHO:  ACC: [id number] EXAM:  ECHO TTE WO CON COMP W DOPP                          PROCEDURE DATE:  2022      INTERPRETATION:  TRANSTHORACIC ECHOCARDIOGRAM REPORT    Patient Name:   MARY JO WILSON Patient Location: [facility] Rec #:  [id number]          Accession #:      [id number]  Account #:                        Height:           64.2 in 163.0 cm  YOB: 1950         Weight:           189.6 lb 86.00 kg  Patient Age:    72 years          BSA:              1.92 m²  Patient Gender: M                 BP:               186/60 mmHg      Date of Exam:        2022 3:30:33 PM  Sonographer:         BRE  Referring Physician: CHRISTOPHER CABRALES    Procedure:     2D Echo/Doppler/Color Doppler Complete.  Indications:   Abnormal electrocardiogram [ECG] [EKG] - R94.31  Diagnosis:     Abnormal electrocardiogram [ECG] [EKG] - R94.31  Study Details: Technically fair study.    2D AND M-MODE MEASUREMENTS (normal ranges within parentheses):  Left                 Normal   Aorta/Left           Normal  Ventricle:                    Atrium:  IVSd (2D):    1.06  (0.7-1.1) Aortic Root    3.24  (2.4-3.7)                 cm             (2D):           cm  LVPWd (2D):   0.99  (0.7-1.1) Left Atrium    3.26  (1.9-4.0)                 cm           (2D):           cm  LVIDd (2D):   4.02  (3.4-5.7) LA Vol Index   35.3                 cm             (A4C):        ml/m²  LVIDs (2D):   2.97            LA Vol Index   39.2                 cm             (A2C):        ml/m²  LV FS (2D):   26.2   (>25%)   LA Vol Index   39.2                  %             (BP):         ml/m²  Relative Wall 0.49   (<0.42)  Right  Thickness                     Ventricle:                                TAPSE:          1.90 cm    LV DIASTOLIC FUNCTION:  MV Peak E: 0.69 m/s Decel Time:  253 msec  MV Peak A: 0.82 m/s Septal E/e'  14.0  E/A Ratio: 0.85     Lateral E/e' 15.8  Septal e'  0.0 m/s  Lateral e' 0.0 m/s    SPECTRAL DOPPLER ANALYSIS (where applicable):  Mitral Valve:  MV P1/2 Time: 73.31 msec  MV Area, PHT: 3.00 cm²    Aortic Valve: AoV Max Faustino: 1.23 m/s AoV Peak P.1 mmHg AoV Mean PG:   3.0 mmHg    LVOT Vmax: 1.01 m/s LVOT VTI: 0.235 m LVOT Diameter: 2.10 cm    AoV Area, Vmax: 2.84 cm² AoV Area, VTI: 3.30 cm² AoV Area, Vmn: 2.76 cm²    Tricuspid Valve and PA/RV Systolic Pressure: TR Max Velocity: 2.45 m/s RA   Pressure: 5 mmHg RVSP/PASP: 29.0 mmHg      PHYSICIAN INTERPRETATION:  Left Ventricle: The left ventricular internal cavity size is normal.  Global LV systolic function was normal.Left ventricular ejection   fraction, by visual estimation, is 55 to 60%. Spectral Doppler shows   impaired relaxation pattern of left ventricular myocardial filling (Grade   I diastolic dysfunction).  Right Ventricle: Normal right ventricular size and function.  Left Atrium: Normal left atrial size.  Right Atrium: Normal right atrial size.  Pericardium: There is no evidence of pericardial effusion.  Mitral Valve: Structurally normal mitral valve, with normal leaflet   excursion. Mitral leaflet mobility is normal. Trace mitral valve   regurgitation is seen.  Tricuspid Valve: Structurally normal tricuspid valve, with normal leaflet   excursion. Trivial tricuspid regurgitation is visualized. Adequate TR   velocity was not obtained to accuratelyassess RVSP.  Aortic Valve: The aortic valve is trileaflet. Sclerotic aortic valve with   normal opening. No evidence of aortic valve regurgitation is seen.  Pulmonic Valve: The pulmonic valve was not well visualized. Trace   pulmonic valve regurgitation.  Aorta: Aortic root measured at Sinus of Valsalva is normal.  Venous: The inferior vena cava was normal sized, with respiratory size   variation greater than 50%.      Summary:   1. Left ventricular ejection fraction, by visual estimation, is 55to   60%.   2. Technically fair study.   3. Normal global left ventricular systolic function.   4. Normal left ventricular internal cavity size.   5. Spectral Doppler shows impaired relaxation pattern of left   ventricular myocardial filling (Grade Idiastolic dysfunction).   6. Normal right ventricular size and function.   7. Normal left atrial size.   8. Normal right atrial size.   9. There is no evidence of pericardial effusion.  10. Structurally normal mitral valve, with normal leaflet excursion.  11. Trace mitral valve regurgitation.  12. Sclerotic aortic valve with normal opening.      [id number] Chris Myers MD FACC, JONO FACLETITIA  Electronically signed on 2022 at 11:35:23 AM      CATH REPORT:  Study Date:     2022   Name:           MARY JO WILSON   :            1950   (72 years)   Gender:         male   MR#:            [id number]   MPI#:           [id number]   Patient Class:  Inpatient     Cath Lab Report    Diagnostic Cardiologist:       Franck Swenson MD   Interventional Cardiologist:   Franck Swenson MD   Fellow:                        Roxana Aguirre MD   Referring Physician:           Miguel Ángel Cardenas MD     Procedures Performed   Procedures:               1.    Arterial Access - Right Radial   2.    Diagnostic Coronary Angiography   3.    PCI: JANINA     Indications:               ACS greater than 24 hours   Lab Visit Indication:      ACS greater than 24 hours   PCI Status:                elective     Conclusions:   Severe stenosis of the proximal circumfelx s/p JANINA      Recommendations:     Cont DAPT x 12 months    Cont optimal medical therapy    Cont follow up with Cardiology    Acute complication:    No complications

## 2022-09-27 DIAGNOSIS — I25.10 ATHEROSCLEROTIC HEART DISEASE OF NATIVE CORONARY ARTERY WITHOUT ANGINA PECTORIS: ICD-10-CM

## 2022-09-27 DIAGNOSIS — Z20.822 CONTACT WITH AND (SUSPECTED) EXPOSURE TO COVID-19: ICD-10-CM

## 2022-09-27 DIAGNOSIS — Z79.02 LONG TERM (CURRENT) USE OF ANTITHROMBOTICS/ANTIPLATELETS: ICD-10-CM

## 2022-09-27 DIAGNOSIS — I12.9 HYPERTENSIVE CHRONIC KIDNEY DISEASE WITH STAGE 1 THROUGH STAGE 4 CHRONIC KIDNEY DISEASE, OR UNSPECIFIED CHRONIC KIDNEY DISEASE: ICD-10-CM

## 2022-09-27 DIAGNOSIS — F12.90 CANNABIS USE, UNSPECIFIED, UNCOMPLICATED: ICD-10-CM

## 2022-09-27 DIAGNOSIS — N18.30 CHRONIC KIDNEY DISEASE, STAGE 3 UNSPECIFIED: ICD-10-CM

## 2022-09-27 DIAGNOSIS — R73.03 PREDIABETES: ICD-10-CM

## 2022-09-27 DIAGNOSIS — R07.9 CHEST PAIN, UNSPECIFIED: ICD-10-CM

## 2022-09-27 DIAGNOSIS — Z79.82 LONG TERM (CURRENT) USE OF ASPIRIN: ICD-10-CM

## 2022-09-27 DIAGNOSIS — Z95.5 PRESENCE OF CORONARY ANGIOPLASTY IMPLANT AND GRAFT: ICD-10-CM

## 2022-09-27 DIAGNOSIS — E78.5 HYPERLIPIDEMIA, UNSPECIFIED: ICD-10-CM

## 2022-09-27 DIAGNOSIS — R94.31 ABNORMAL ELECTROCARDIOGRAM [ECG] [EKG]: ICD-10-CM

## 2022-09-27 DIAGNOSIS — N40.0 BENIGN PROSTATIC HYPERPLASIA WITHOUT LOWER URINARY TRACT SYMPTOMS: ICD-10-CM

## 2022-09-27 DIAGNOSIS — Z86.73 PERSONAL HISTORY OF TRANSIENT ISCHEMIC ATTACK (TIA), AND CEREBRAL INFARCTION WITHOUT RESIDUAL DEFICITS: ICD-10-CM

## 2023-02-07 ENCOUNTER — EMERGENCY (EMERGENCY)
Facility: HOSPITAL | Age: 73
LOS: 1 days | Discharge: HOME | End: 2023-02-07
Attending: EMERGENCY MEDICINE | Admitting: EMERGENCY MEDICINE
Payer: MEDICARE

## 2023-02-07 VITALS
HEART RATE: 97 BPM | OXYGEN SATURATION: 100 % | SYSTOLIC BLOOD PRESSURE: 230 MMHG | TEMPERATURE: 98 F | DIASTOLIC BLOOD PRESSURE: 120 MMHG | RESPIRATION RATE: 16 BRPM

## 2023-02-07 DIAGNOSIS — Z98.890 OTHER SPECIFIED POSTPROCEDURAL STATES: Chronic | ICD-10-CM

## 2023-02-07 DIAGNOSIS — R31.0 GROSS HEMATURIA: ICD-10-CM

## 2023-02-07 LAB
ALBUMIN SERPL ELPH-MCNC: 4.2 G/DL — SIGNIFICANT CHANGE UP (ref 3.3–5)
ALP SERPL-CCNC: 70 U/L — SIGNIFICANT CHANGE UP (ref 40–120)
ALT FLD-CCNC: 12 U/L — SIGNIFICANT CHANGE UP (ref 4–41)
ANION GAP SERPL CALC-SCNC: 13 MMOL/L — SIGNIFICANT CHANGE UP (ref 7–14)
APPEARANCE UR: CLEAR — SIGNIFICANT CHANGE UP
AST SERPL-CCNC: 21 U/L — SIGNIFICANT CHANGE UP (ref 4–40)
BACTERIA # UR AUTO: NEGATIVE — SIGNIFICANT CHANGE UP
BASOPHILS # BLD AUTO: 0.02 K/UL — SIGNIFICANT CHANGE UP (ref 0–0.2)
BASOPHILS NFR BLD AUTO: 0.2 % — SIGNIFICANT CHANGE UP (ref 0–2)
BILIRUB SERPL-MCNC: 0.4 MG/DL — SIGNIFICANT CHANGE UP (ref 0.2–1.2)
BILIRUB UR-MCNC: NEGATIVE — SIGNIFICANT CHANGE UP
BLOOD GAS VENOUS COMPREHENSIVE RESULT: SIGNIFICANT CHANGE UP
BUN SERPL-MCNC: 22 MG/DL — SIGNIFICANT CHANGE UP (ref 7–23)
CALCIUM SERPL-MCNC: 9.4 MG/DL — SIGNIFICANT CHANGE UP (ref 8.4–10.5)
CHLORIDE SERPL-SCNC: 105 MMOL/L — SIGNIFICANT CHANGE UP (ref 98–107)
CO2 SERPL-SCNC: 22 MMOL/L — SIGNIFICANT CHANGE UP (ref 22–31)
COLOR SPEC: COLORLESS — SIGNIFICANT CHANGE UP
CREAT SERPL-MCNC: 1.4 MG/DL — HIGH (ref 0.5–1.3)
DIFF PNL FLD: ABNORMAL
EGFR: 53 ML/MIN/1.73M2 — LOW
EOSINOPHIL # BLD AUTO: 0.02 K/UL — SIGNIFICANT CHANGE UP (ref 0–0.5)
EOSINOPHIL NFR BLD AUTO: 0.2 % — SIGNIFICANT CHANGE UP (ref 0–6)
EPI CELLS # UR: 0 /HPF — SIGNIFICANT CHANGE UP (ref 0–5)
FLUAV AG NPH QL: SIGNIFICANT CHANGE UP
FLUBV AG NPH QL: SIGNIFICANT CHANGE UP
GLUCOSE SERPL-MCNC: 107 MG/DL — HIGH (ref 70–99)
GLUCOSE UR QL: NEGATIVE — SIGNIFICANT CHANGE UP
HCT VFR BLD CALC: 36.1 % — LOW (ref 39–50)
HGB BLD-MCNC: 11.9 G/DL — LOW (ref 13–17)
IANC: 7.54 K/UL — HIGH (ref 1.8–7.4)
IMM GRANULOCYTES NFR BLD AUTO: 0.2 % — SIGNIFICANT CHANGE UP (ref 0–0.9)
KETONES UR-MCNC: NEGATIVE — SIGNIFICANT CHANGE UP
LEUKOCYTE ESTERASE UR-ACNC: NEGATIVE — SIGNIFICANT CHANGE UP
LYMPHOCYTES # BLD AUTO: 0.76 K/UL — LOW (ref 1–3.3)
LYMPHOCYTES # BLD AUTO: 8.5 % — LOW (ref 13–44)
MCHC RBC-ENTMCNC: 27.9 PG — SIGNIFICANT CHANGE UP (ref 27–34)
MCHC RBC-ENTMCNC: 33 GM/DL — SIGNIFICANT CHANGE UP (ref 32–36)
MCV RBC AUTO: 84.5 FL — SIGNIFICANT CHANGE UP (ref 80–100)
MONOCYTES # BLD AUTO: 0.56 K/UL — SIGNIFICANT CHANGE UP (ref 0–0.9)
MONOCYTES NFR BLD AUTO: 6.3 % — SIGNIFICANT CHANGE UP (ref 2–14)
NEUTROPHILS # BLD AUTO: 7.54 K/UL — HIGH (ref 1.8–7.4)
NEUTROPHILS NFR BLD AUTO: 84.6 % — HIGH (ref 43–77)
NITRITE UR-MCNC: NEGATIVE — SIGNIFICANT CHANGE UP
NRBC # BLD: 0 /100 WBCS — SIGNIFICANT CHANGE UP (ref 0–0)
NRBC # FLD: 0 K/UL — SIGNIFICANT CHANGE UP (ref 0–0)
PH UR: 5 — SIGNIFICANT CHANGE UP (ref 5–8)
PLATELET # BLD AUTO: 191 K/UL — SIGNIFICANT CHANGE UP (ref 150–400)
POTASSIUM SERPL-MCNC: 3.6 MMOL/L — SIGNIFICANT CHANGE UP (ref 3.5–5.3)
POTASSIUM SERPL-SCNC: 3.6 MMOL/L — SIGNIFICANT CHANGE UP (ref 3.5–5.3)
PROT SERPL-MCNC: 7 G/DL — SIGNIFICANT CHANGE UP (ref 6–8.3)
PROT UR-MCNC: NEGATIVE — SIGNIFICANT CHANGE UP
RBC # BLD: 4.27 M/UL — SIGNIFICANT CHANGE UP (ref 4.2–5.8)
RBC # FLD: 14.7 % — HIGH (ref 10.3–14.5)
RBC CASTS # UR COMP ASSIST: 123 /HPF — HIGH (ref 0–4)
RSV RNA NPH QL NAA+NON-PROBE: SIGNIFICANT CHANGE UP
SARS-COV-2 RNA SPEC QL NAA+PROBE: SIGNIFICANT CHANGE UP
SODIUM SERPL-SCNC: 140 MMOL/L — SIGNIFICANT CHANGE UP (ref 135–145)
SP GR SPEC: 1 — LOW (ref 1.01–1.05)
UROBILINOGEN FLD QL: SIGNIFICANT CHANGE UP
WBC # BLD: 8.92 K/UL — SIGNIFICANT CHANGE UP (ref 3.8–10.5)
WBC # FLD AUTO: 8.92 K/UL — SIGNIFICANT CHANGE UP (ref 3.8–10.5)
WBC UR QL: 0 /HPF — SIGNIFICANT CHANGE UP (ref 0–5)

## 2023-02-07 PROCEDURE — 74178 CT ABD&PLV WO CNTR FLWD CNTR: CPT | Mod: 26,MA

## 2023-02-07 PROCEDURE — 99285 EMERGENCY DEPT VISIT HI MDM: CPT

## 2023-02-07 RX ORDER — MORPHINE SULFATE 50 MG/1
4 CAPSULE, EXTENDED RELEASE ORAL ONCE
Refills: 0 | Status: DISCONTINUED | OUTPATIENT
Start: 2023-02-07 | End: 2023-02-07

## 2023-02-07 RX ADMIN — MORPHINE SULFATE 4 MILLIGRAM(S): 50 CAPSULE, EXTENDED RELEASE ORAL at 19:15

## 2023-02-07 NOTE — ED PROVIDER NOTE - CARE PROVIDER_API CALL
Pito Demarco)  Urology  [address] [place] [address], [address]  [place] [address]  Phone: [phone]  Fax: [phone]  Follow Up Time: 1-3 Days

## 2023-02-07 NOTE — CONSULT NOTE ADULT - ASSESSMENT
72 y.o male with PMHx of  BPH on FLOMAX CAD with stents on Plavix and ASA, right carotid stent, HTN, HLD presented to the ER with gross hematuria since yesterday. Today he developed some discomfort, dysuria, and was unable to urinate.  He was irrigated with a 22 FR six-eye catheter, then a 20FR 3-way was left in place, 2 bags of CBI were used. Urine color has been pink off CBI. 72 y.o male with PMHx of  BPH on FLOMAX CAD with stents on Plavix and ASA, right carotid stent, HTN, HLD presented to the ER with gross hematuria since yesterday. Today he developed some discomfort, dysuria, and was unable to urinate.  He was irrigated with a 22 FR six-eye catheter, then a 20FR 3-way was left in place, 2 bags of CBI were used. Urine color has been pink off CBI. Patient does not want to follow up with his private urologist.

## 2023-02-07 NOTE — ED PROVIDER NOTE - OBJECTIVE STATEMENT
72yM w/PMHx htn, hld, CAD s/p PCI, CVA, BPH is on plavix presenting for bright red hematuria x1 day. Started yesterday morning when npt peed bright red throughout stream. Later urine was clear however became bloody again in the evening. Pt also having burning and pain when urinating. Pt states he had hematuria one time a year ago. Pt was started on a muscle relaxer 3 days ago by PCP for leg cramps, otherwise no recent changes. Denies trauma/falls. Endorses intermittent lightheadedness not increased from his baseline. Denies fever, chills, cp, sob, blurry vision, n/v/c/d.    PCP - Nina

## 2023-02-07 NOTE — ED PROVIDER NOTE - PATIENT PORTAL LINK FT
You can access the FollowMyHealth Patient Portal offered by [facility] by registering at the following website: [email or web address]. By joining [facility]’s FollowMyHealth portal, you will also be able to view your health information using other applications (apps) compatible with our system.

## 2023-02-07 NOTE — ED PROVIDER NOTE - CLINICAL SUMMARY MEDICAL DECISION MAKING FREE TEXT BOX
72yM hx CAD, htn, hld, cva, bph presents for hematuria x1 day. Bright red blood draining from hussein, suprapubic tenderness, clots coming out of 3 way hussein. Consult uro for CBI, cbc, cmp, morphine for analgesia, vbg. Dispo pending results

## 2023-02-07 NOTE — ED PROVIDER NOTE - PROGRESS NOTE DETAILS
Basim St MD PGY-1: Urology consulted for CBI Sai Zhong, PGY-3- Patient received at sign out. Uro at bedside. Plan for CBI. Pt updated Sai Zhong, PGY-3- Uro evaluated pt. Recommending CT abd/pelvi urogram with and without contrast. Order placed. Spoke with CT tech- pt will need scan upstairs. CT tech to coordinate with upstairs CT tech. Sai Zhong, PGY-3- Uro clamped the CBI, observing for return of hematuria. Awaiting CT. CLAU Zimmerman PGY2 d/w uro re; adding finasteride to home regimen with flomax. to follow up outpatient for eval hussein removal.

## 2023-02-07 NOTE — ED PROVIDER NOTE - NS ED ROS FT
REVIEW OF SYSTEMS:    CONSTITUTIONAL: No fever, weight loss, or fatigue  EYES: No eye pain, visual disturbances, or discharge  ENMT:  No difficulty hearing, tinnitus, vertigo; No sinus or throat pain  NECK: No pain or stiffness  RESPIRATORY: No cough, wheezing, chills or hemoptysis; No shortness of breath  CARDIOVASCULAR: No chest pain, palpitations, dizziness, or leg swelling  GASTROINTESTINAL: No abdominal or epigastric pain. No nausea, vomiting, or hematemesis; No diarrhea or constipation. No melena or hematochezia.  GENITOURINARY: No dysuria, frequency, +hematuria, no incontinence  NEUROLOGICAL: No headaches, memory loss, loss of strength, numbness, or tremors  SKIN: No itching, burning, rashes, or lesions

## 2023-02-07 NOTE — ED ADULT NURSE NOTE - OBJECTIVE STATEMENT
Patient alert and oriented x3 with c/o of hematuria x1 day. The patient verbalized the hematuria started yesterday but today pain noted 10/10 on numerical scale started 4 hours ago. Blood noted in urinal. Green placed by PA. No distress noted IV # 20 placed in left antecubital. No distress noted at this time. Patient was updated on the plan of care.

## 2023-02-07 NOTE — ED PROVIDER NOTE - PHYSICAL EXAMINATION
GENERAL: uncomfortable appearing screaming in pain while hussein being placed, in no acute distress, non-toxic appearing  HEAD: normocephalic, atraumatic  HEENT: normal conjunctiva, no JVD  CARDIAC: regular rate and rhythm, normal S1S2, no appreciable murmurs, 2+ pulses in UE b/l  PULM: normal breath sounds, clear to ascultation bilaterally, no rales, rhonchi, wheezing  GI: abdomen nondistended, soft, TTP to hypogastric/suprapubic area, no guarding, no rebound tenderness  : + suprapubic tenderness  NEURO: normal speech, PERRL, EOMI, AAOx3  MSK: no peripheral edema, no calf tenderness b/l  SKIN: well-perfused, extremities warm, no visible rashes  PSYCH: appropriate mood and affect    Gross bright red blood draining from hussein

## 2023-02-07 NOTE — ED PROVIDER NOTE - NSFOLLOWUPINSTRUCTIONS_ED_ALL_ED_FT
You were seen in the Emergency Department for bloody urine with clots.     1) Advance activity as tolerated.   2) Continue all previously prescribed medications as directed.    3) Follow up with your primary care physician in 24-48 hours - take copies of your results.    4) Return to the Emergency Department for worsening or persistent symptoms, and/or ANY NEW OR CONCERNING SYMPTOMS.    Your bladder was washed out, and no additional clots were seen. Please keep the hussein catheter in the bladder until you see the urologist.     if you notice that there is no longer any urine draining into the bag, or you notice clots passing into the bag, or if you develop severe pain, please immediately seek medical attention or return to the ER.    Please  the medication sent to your pharmacy - finasteride 5mg once a day.

## 2023-02-07 NOTE — CONSULT NOTE ADULT - PROBLEM SELECTOR RECOMMENDATION 9
F/U official CT  No need for CBI  Stay hydrated  Bowel regimen  will need  outpatient follow-up F/U official CT  No need for CBI  Start finasteride 5mg daily  Continue Flomax  Home with hussein  Stay hydrated  Bowel regimen  F/U with Dr Demarco as outpatient [phone]  Case discussed with Dr Campbell/ Dr Martínez No need for CBI  Start finasteride 5mg daily  Continue Flomax  Home with hussein  Stay hydrated  Bowel regimen  F/U with Dr Demarco as outpatient [phone]  Case discussed with Dr Campbell/ Dr Martínez

## 2023-02-07 NOTE — ED PROVIDER NOTE - ATTENDING CONTRIBUTION TO CARE
Brief HPI:  72-year-old male past medical history of hypertension, hyperlipidemia, CAD status post PCI on Plavix, CVA, BPH presenting with hematuria, frequency, difficulty urinating and suprapubic pain x1 day.  Patient denies fever, numbness, tingling, chills, back pain, trauma.    Vitals:   Reviewed    Exam:    GEN:  Non-toxic appearing, non-distressed, speaking full sentences, non-diaphoretic, AAOx3  HEENT:  NCAT, neck supple, EOMI, PERRLA, sclera anicteric, no conjunctival pallor or injection, no stridor, normal voice, no tonsillar exudate, uvula midline  CV:  regular rhythm and rate, s1/s2 audible, no murmurs, rubs or gallops, peripheral pulses 2+ and symmetric  PULM:  non-labored respirations, lungs clear to auscultation bilaterally, no wheezes, crackles or rales  ABD:  non distended, suprapubic tenderness, no rebound, no guarding, negative Stinson's sign, bowel sounds normal, no cvat  MSK:  no gross deformity, non-tender extremities and joints, range of motion grossly normal appearing, no extremity edema, extremities warm and well perfused   NEURO:  AAOx3, CN II-XII intact, motor 5/5 in upper and lower extremities bilaterally, sensation grossly intact in extremities and trunk, finger to nose testing wnl, no nystagmus, negative Romberg, no pronator drift, no gait deficit  SKIN:  warm, dry, no rash or vesicles     A/P:  72-year-old male past medical history of hypertension, hyperlipidemia, CAD status post PCI on Plavix, CVA, BPH presenting with hematuria, frequency, difficulty urinating and suprapubic pain x1 day.  Hypertensive, afebrile.  Exam uncomfortable appearing, suprapubic tenderness on exam but nonperitoneal.  Suspect urinary retention likely secondary to hematuria with clots.  Will place Green catheter, labs, irrigation of bladder.  Disposition pending.

## 2023-02-07 NOTE — ED ADULT NURSE REASSESSMENT NOTE - NS ED NURSE REASSESS COMMENT FT1
Float RN: Pt A&Ox4 resting on stretcher. Pt hypertensive but asymptomatic at this time, denies any headache, blurry vision or discomfort. Provider aware. Respirations even and unlabored, no acute distress noted. Pending CT results. bed in lowest position, side rails up, call bell in hand, safety maintained.

## 2023-02-07 NOTE — CONSULT NOTE ADULT - SUBJECTIVE AND OBJECTIVE BOX
HPI  72 y.o male with PMHx of  BPH on FLOMAX CAD with stents in  on Plavix and ASA, right carotid stent, HTN, HLD presented to the ER with gross hematuria since yesterday. Today he developed some discomfort, dysuria, and was unable to urinate.  He had an episode of gross hematuria last year and was treated with antibiotics, s/p cystoscopy with private urologist last year.     In ED he was irrigated with a 22FR six-eye catheter, about 60cc of clots removed but became clear. A 20FR 3-way catheter was left in place. The urine became bloody, he was started on CBI which is running clear.     PAST MEDICAL & SURGICAL HISTORY:  Hypertension      Hypercholesterolemia      CAD (coronary artery disease)      Stented coronary artery      CVA (cerebral vascular accident)  2021      History of coronary angiogram  2015 stent x1   2018 stent x1          MEDICATIONS  (STANDING): Plavix, ASA,     MEDICATIONS  (PRN):      FAMILY HISTORY:  FHx: prostate cancer (Father)  Brother with prostate cancer      Allergies    No Known Allergies    Intolerances        SOCIAL HISTORY: No smoking    REVIEW OF SYSTEMS: Otherwise negative as stated in HPI    Physical Exam  Vital signs  T(F): 97.6 (23 @ 16:58), Max: 97.6 (23 @ 16:58)  HR: 97 (23 @ 16:58)  BP: 188/93 (23 @ 21:54)  SpO2: 100% (23 @ 16:58)    Output    UOP    Gen:  [x] NAD [] toxic    Pulm:  [x] no resp distress [x] no substernal retractions  	  CV:  [] no JVD  [x] RRR    GI:  [x] Soft, distended,  [x] NT  :  Glans Circumcised []Y  [x]N, []lesions:  Meatus Discharge []Y  [x] N,  Blood [x]Y [] N  20FR 3-way in place, off CBI for 2 hours, color is light pink                          	  MSK:  Edema []Y [x]N    LABS:       @ 19:29    WBC 8.92  / Hct 36.1  / SCr 1.40       Urinalysis Basic - ( 2023 20:20 )    Color: Colorless / Appearance: Clear / S.005 / pH: x  Gluc: x / Ketone: Negative  / Bili: Negative / Urobili: <2 mg/dL   Blood: x / Protein: Negative / Nitrite: Negative   Leuk Esterase: Negative / RBC: 123 /HPF / WBC 0 /HPF   Sq Epi: x / Non Sq Epi: 0 /HPF / Bacteria: Negative        Urine Cx: p  Blood Cx:    RADIOLOGY:     HPI  72 y.o male with PMHx of  BPH on FLOMAX CAD with stents in  on Plavix and ASA, right carotid stent, HTN, HLD presented to the ER with gross hematuria since yesterday. Today he developed some discomfort, dysuria, and was unable to urinate.  He had an episode of gross hematuria last year and was treated with antibiotics, s/p cystoscopy with private urologist Dr Randall last year.     In ED he was irrigated with a 22FR six-eye catheter, about 60cc of clots removed but became clear. A 20FR 3-way catheter was left in place. The urine became bloody, he was started on CBI which is running clear.     PAST MEDICAL & SURGICAL HISTORY:  Hypertension      Hypercholesterolemia      CAD (coronary artery disease)      Stented coronary artery      CVA (cerebral vascular accident)  2021      History of coronary angiogram  2015 stent x1   2018 stent x1          MEDICATIONS  (STANDING): Plavix, ASA,     MEDICATIONS  (PRN):      FAMILY HISTORY:  FHx: prostate cancer (Father)  Brother with prostate cancer      Allergies    No Known Allergies    Intolerances        SOCIAL HISTORY: No smoking    REVIEW OF SYSTEMS: Otherwise negative as stated in HPI    Physical Exam  Vital signs  T(F): 97.6 (23 @ 16:58), Max: 97.6 (23 @ 16:58)  HR: 97 (23 @ 16:58)  BP: 188/93 (23 @ 21:54)  SpO2: 100% (23 @ 16:58)    Output    UOP    Gen:  [x] NAD [] toxic    Pulm:  [x] no resp distress [x] no substernal retractions  	  CV:  [] no JVD  [x] RRR    GI:  [x] Soft, distended,  [x] NT  :  Glans Circumcised []Y  [x]N, []lesions:  Meatus Discharge []Y  [x] N,  Blood [x]Y [] N  20FR 3-way in place, off CBI for 2 hours, color is light pink                          	  MSK:  Edema []Y [x]N    LABS:       @ 19:29    WBC 8.92  / Hct 36.1  / SCr 1.40       Urinalysis Basic - ( 2023 20:20 )    Color: Colorless / Appearance: Clear / S.005 / pH: x  Gluc: x / Ketone: Negative  / Bili: Negative / Urobili: <2 mg/dL   Blood: x / Protein: Negative / Nitrite: Negative   Leuk Esterase: Negative / RBC: 123 /HPF / WBC 0 /HPF   Sq Epi: x / Non Sq Epi: 0 /HPF / Bacteria: Negative        Urine Cx: p  Blood Cx:    RADIOLOGY:

## 2023-02-08 ENCOUNTER — APPOINTMENT (OUTPATIENT)
Dept: VASCULAR SURGERY | Facility: CLINIC | Age: 73
End: 2023-02-08

## 2023-02-08 VITALS
HEART RATE: 91 BPM | OXYGEN SATURATION: 99 % | TEMPERATURE: 98 F | SYSTOLIC BLOOD PRESSURE: 174 MMHG | DIASTOLIC BLOOD PRESSURE: 82 MMHG | RESPIRATION RATE: 17 BRPM

## 2023-02-08 RX ORDER — FINASTERIDE 5 MG/1
1 TABLET, FILM COATED ORAL
Qty: 30 | Refills: 0
Start: 2023-02-08 | End: 2023-03-09

## 2023-02-08 RX ORDER — ACETAMINOPHEN 500 MG
975 TABLET ORAL ONCE
Refills: 0 | Status: COMPLETED | OUTPATIENT
Start: 2023-02-08 | End: 2023-02-08

## 2023-02-08 RX ORDER — OLANZAPINE 15 MG/1
2.5 TABLET, FILM COATED ORAL ONCE
Refills: 0 | Status: DISCONTINUED | OUTPATIENT
Start: 2023-02-08 | End: 2023-02-08

## 2023-02-08 RX ADMIN — Medication 975 MILLIGRAM(S): at 03:44

## 2023-02-08 NOTE — ED ADULT NURSE REASSESSMENT NOTE - NS ED NURSE REASSESS COMMENT FT1
Pt discharged, awaiting assistance from S/W for D/C. IV out, F/C changed to leg bag. Pt medicated for pain to penis as per provider order. Safety maintained. No acute distress noted. Will continue to monitor until D/C.

## 2023-02-08 NOTE — PROVIDER CONTACT NOTE (OTHER) - ASSESSMENT
Met with pt at bedside; pt states his car is parked at his place of employment.  Is requesting a taxi to his car.  States he will private pay via debit card,

## 2023-02-09 ENCOUNTER — APPOINTMENT (OUTPATIENT)
Dept: UROLOGY | Facility: CLINIC | Age: 73
End: 2023-02-09
Payer: MEDICARE

## 2023-02-09 VITALS
HEART RATE: 108 BPM | BODY MASS INDEX: 35.34 KG/M2 | OXYGEN SATURATION: 99 % | HEIGHT: 60 IN | WEIGHT: 180 LBS | TEMPERATURE: 98 F | DIASTOLIC BLOOD PRESSURE: 83 MMHG | SYSTOLIC BLOOD PRESSURE: 163 MMHG

## 2023-02-09 DIAGNOSIS — R35.1 BENIGN PROSTATIC HYPERPLASIA WITH LOWER URINARY TRACT SYMPMS: ICD-10-CM

## 2023-02-09 DIAGNOSIS — R33.9 RETENTION OF URINE, UNSPECIFIED: ICD-10-CM

## 2023-02-09 DIAGNOSIS — R31.0 GROSS HEMATURIA: ICD-10-CM

## 2023-02-09 DIAGNOSIS — N40.1 BENIGN PROSTATIC HYPERPLASIA WITH LOWER URINARY TRACT SYMPMS: ICD-10-CM

## 2023-02-09 LAB
CULTURE RESULTS: NO GROWTH — SIGNIFICANT CHANGE UP
SPECIMEN SOURCE: SIGNIFICANT CHANGE UP

## 2023-02-09 PROCEDURE — 99204 OFFICE O/P NEW MOD 45 MIN: CPT

## 2023-02-09 RX ORDER — FINASTERIDE 5 MG/1
5 TABLET, FILM COATED ORAL DAILY
Qty: 90 | Refills: 3 | Status: ACTIVE | COMMUNITY
Start: 2023-02-09 | End: 1900-01-01

## 2023-02-09 NOTE — LETTER BODY
[Dear  ___] : Dear  [unfilled], [Consult Letter:] : I had the pleasure of evaluating your patient, [unfilled]. [Please see my note below.] : Please see my note below. [Consult Closing:] : Thank you very much for allowing me to participate in the care of this patient.  If you have any questions, please do not hesitate to contact me. [Sincerely,] : Sincerely, [FreeTextEntry2] : Ingris Bearden MD\par [address] [facility] [address], [place] [address] [FreeTextEntry3] : Pito Demarco MD\par [facility]\par [address] [place] [address], [address]\par [place], [place]\par [address]\par p: [phone]\par f: [phone]

## 2023-02-09 NOTE — PHYSICAL EXAM
[Normal Appearance] : normal appearance [Edema] : no peripheral edema [Abdomen Tenderness] : non-tender [Costovertebral Angle Tenderness] : no ~M costovertebral angle tenderness [Urethral Meatus] : meatus normal [FreeTextEntry1] : 20F 3-way catheter draining yellow light pink urine

## 2023-02-09 NOTE — HISTORY OF PRESENT ILLNESS
[FreeTextEntry1] : 72 y.o male with PMHx of  BPH on flomax, CAD with stents on Plavix and ASA, right carotid stent, HTN, HLD who presents as an ED follow-up.\par \par Was seen  with gross hematuria. He was irrigated with a catheter and initiated on CBI. He was discharged to home and returns today\par \par CTU 2023\par No renal stones, renal masses, or suspicious urothelial lesion. Redemonstration of a markedly enlarged prostate gland - 126cc\par \par No history of retention in the past. History of a vascular stent in 2022, on aspirin / plavix. He has been holding both this week. Dad with prostate cancer in  -  of other causes. Takes flomax, not finasteride. Non smoker. No history of abdominal /  surgery. \par \par At baseline, reports urgency, weak stream, intermittency\par \par Anticoagulation: Aspirin, plavix\par All: None\par Social: Non smoker. 2-3 glasses of wine / week. Works as a \par PMHx: HTN, carotid stent\par PSHx: Carotid stent\par FHx: PCa as above\par \par Denies gross hematuria, flank pain, fevers, chills, nausea, vomiting.

## 2023-02-09 NOTE — ASSESSMENT
[FreeTextEntry1] : 72 y.o. M with gross hematuria, retention\par - CT reviewed - 126 g prostate\par - Voiding trial performed - filled with 120 mL (could not tolerate additional) and voided 120 mL - PVR 0 mL\par - Return 2 weeks for cystoscopy\par - Continue Flomax, initiate finasteride\par - Briefly discussed surgical options given his enlarged prostate, hematuria, retention.  Discussed based on the size of his prostate, laser enucleation would be good option.  He will think about this.

## 2023-02-14 ENCOUNTER — APPOINTMENT (OUTPATIENT)
Dept: VASCULAR SURGERY | Facility: CLINIC | Age: 73
End: 2023-02-14
Payer: MEDICARE

## 2023-02-14 PROCEDURE — 93880 EXTRACRANIAL BILAT STUDY: CPT

## 2023-02-23 ENCOUNTER — APPOINTMENT (OUTPATIENT)
Dept: VASCULAR SURGERY | Facility: CLINIC | Age: 73
End: 2023-02-23
Payer: MEDICARE

## 2023-02-23 PROCEDURE — 99442: CPT

## 2023-02-23 NOTE — HISTORY OF PRESENT ILLNESS
[FreeTextEntry1] : MARY JO WILSON is a 72 year old male who is s/p R TCAR (2/2022) for symptomatic carotid artery disease. He is on Plavix 75 mg, ASA 81 mg and rosuvastatin. \par \par Today he complains of occasional right neck cramp/strain. He has this discomfort at least once a week. This has been ongoing since his TCAR surgery. The cramp occurs when he turns his neck to the right. The neck is not painful. The cramp resolves spontaneously after couple of minutes. He has not seen his PCP regarding the neck cramp. Otherwise denies focal neurologic deficits including amaurosis fugax, slurred speech, and weakness in the arms/legs. No dizziness/lightheadedness. \par \par Patient complains of cramps mainly in both thighs L>R and feet for the past year. The cramps usually occur when the patient is lying flat. He denies claudication, rest pain or tissue loss.\par \par Interval events: Pt was admitted to the ED early February 2023 for gross hematuria and unable to urinate. He was irrigated with a catheter and initiated on CBI. He hasn't had hematuria for about a week now. He is being followed by his urologist.\par \par PMH: HTN, HLD, borderline DM, BPH, heart disease s/p 3 cardiac stents\par \par Carotid duplex from 2/14/23 shows a patent R ICA stent; acoustic shadowing noted at the bulb. L ICA with mild disease. Bilaterally antegrade vertebral flow.

## 2023-02-23 NOTE — ASSESSMENT
[FreeTextEntry1] : A/P:\par \par Doing well s/p TCAR for symptomatic carotid. Advised pt to stay hydrated since he is experiencing leg and feet cramps. Follow up in 6 months for repeat carotid duplex, followed by telehealth. \par \par

## 2023-02-23 NOTE — REASON FOR VISIT
[Home] : at home, [unfilled] , at the time of the visit. [Medical Office: ([facility])___] : at the medical office located in  [Verbal consent obtained from patient] : the patient, [unfilled]

## 2023-03-02 ENCOUNTER — APPOINTMENT (OUTPATIENT)
Dept: UROLOGY | Facility: CLINIC | Age: 73
End: 2023-03-02

## 2023-04-28 ENCOUNTER — EMERGENCY (EMERGENCY)
Facility: HOSPITAL | Age: 73
LOS: 0 days | Discharge: HOME | End: 2023-04-28
Attending: EMERGENCY MEDICINE
Payer: MEDICARE

## 2023-04-28 VITALS
HEART RATE: 65 BPM | DIASTOLIC BLOOD PRESSURE: 81 MMHG | TEMPERATURE: 99 F | RESPIRATION RATE: 16 BRPM | SYSTOLIC BLOOD PRESSURE: 174 MMHG | OXYGEN SATURATION: 98 %

## 2023-04-28 VITALS
HEIGHT: 66 IN | OXYGEN SATURATION: 99 % | TEMPERATURE: 98 F | WEIGHT: 179.9 LBS | HEART RATE: 77 BPM | RESPIRATION RATE: 19 BRPM | DIASTOLIC BLOOD PRESSURE: 124 MMHG | SYSTOLIC BLOOD PRESSURE: 206 MMHG

## 2023-04-28 DIAGNOSIS — R20.2 PARESTHESIA OF SKIN: ICD-10-CM

## 2023-04-28 DIAGNOSIS — S60.511A ABRASION OF RIGHT HAND, INITIAL ENCOUNTER: ICD-10-CM
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
## 2023-04-28 DIAGNOSIS — Z79.02 LONG TERM (CURRENT) USE OF ANTITHROMBOTICS/ANTIPLATELETS: ICD-10-CM

## 2023-04-28 DIAGNOSIS — Z79.82 LONG TERM (CURRENT) USE OF ASPIRIN: ICD-10-CM
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
## 2023-04-28 DIAGNOSIS — I10 ESSENTIAL (PRIMARY) HYPERTENSION: ICD-10-CM

## 2023-04-28 DIAGNOSIS — M25.511 PAIN IN RIGHT SHOULDER: ICD-10-CM

## 2023-04-28 DIAGNOSIS — Z98.890 OTHER SPECIFIED POSTPROCEDURAL STATES: Chronic | ICD-10-CM

## 2023-04-28 DIAGNOSIS — E78.5 HYPERLIPIDEMIA, UNSPECIFIED: ICD-10-CM

## 2023-04-28 DIAGNOSIS — R20.0 ANESTHESIA OF SKIN: ICD-10-CM

## 2023-04-28 DIAGNOSIS — Z23 ENCOUNTER FOR IMMUNIZATION: ICD-10-CM

## 2023-04-28 LAB
ALBUMIN SERPL ELPH-MCNC: 3.6 G/DL — SIGNIFICANT CHANGE UP (ref 3.3–5)
ALP SERPL-CCNC: 93 U/L — SIGNIFICANT CHANGE UP (ref 40–120)
ALT FLD-CCNC: 22 U/L — SIGNIFICANT CHANGE UP (ref 12–78)
ANION GAP SERPL CALC-SCNC: 0 MMOL/L — LOW (ref 5–17)
AST SERPL-CCNC: 16 U/L — SIGNIFICANT CHANGE UP (ref 15–37)
BASOPHILS # BLD AUTO: 0.02 K/UL — SIGNIFICANT CHANGE UP (ref 0–0.2)
BASOPHILS NFR BLD AUTO: 0.3 % — SIGNIFICANT CHANGE UP (ref 0–2)
BILIRUB SERPL-MCNC: 0.4 MG/DL — SIGNIFICANT CHANGE UP (ref 0.2–1.2)
BUN SERPL-MCNC: 20 MG/DL — SIGNIFICANT CHANGE UP (ref 7–23)
CALCIUM SERPL-MCNC: 9 MG/DL — SIGNIFICANT CHANGE UP (ref 8.5–10.1)
CHLORIDE SERPL-SCNC: 113 MMOL/L — HIGH (ref 96–108)
CO2 SERPL-SCNC: 29 MMOL/L — SIGNIFICANT CHANGE UP (ref 22–31)
CREAT SERPL-MCNC: 1.44 MG/DL — HIGH (ref 0.5–1.3)
EGFR: 51 ML/MIN/1.73M2 — LOW
EOSINOPHIL # BLD AUTO: 0.13 K/UL — SIGNIFICANT CHANGE UP (ref 0–0.5)
EOSINOPHIL NFR BLD AUTO: 2 % — SIGNIFICANT CHANGE UP (ref 0–6)
GLUCOSE SERPL-MCNC: 103 MG/DL — HIGH (ref 70–99)
HCT VFR BLD CALC: 41.5 % — SIGNIFICANT CHANGE UP (ref 39–50)
HGB BLD-MCNC: 13.7 G/DL — SIGNIFICANT CHANGE UP (ref 13–17)
IMM GRANULOCYTES NFR BLD AUTO: 0.3 % — SIGNIFICANT CHANGE UP (ref 0–0.9)
LYMPHOCYTES # BLD AUTO: 1.61 K/UL — SIGNIFICANT CHANGE UP (ref 1–3.3)
LYMPHOCYTES # BLD AUTO: 24.6 % — SIGNIFICANT CHANGE UP (ref 13–44)
MCHC RBC-ENTMCNC: 28.4 PG — SIGNIFICANT CHANGE UP (ref 27–34)
MCHC RBC-ENTMCNC: 33 G/DL — SIGNIFICANT CHANGE UP (ref 32–36)
MCV RBC AUTO: 86.1 FL — SIGNIFICANT CHANGE UP (ref 80–100)
MONOCYTES # BLD AUTO: 0.49 K/UL — SIGNIFICANT CHANGE UP (ref 0–0.9)
MONOCYTES NFR BLD AUTO: 7.5 % — SIGNIFICANT CHANGE UP (ref 2–14)
NEUTROPHILS # BLD AUTO: 4.28 K/UL — SIGNIFICANT CHANGE UP (ref 1.8–7.4)
NEUTROPHILS NFR BLD AUTO: 65.3 % — SIGNIFICANT CHANGE UP (ref 43–77)
NRBC # BLD: 0 /100 WBCS — SIGNIFICANT CHANGE UP (ref 0–0)
PLATELET # BLD AUTO: 170 K/UL — SIGNIFICANT CHANGE UP (ref 150–400)
POTASSIUM SERPL-MCNC: 3.5 MMOL/L — SIGNIFICANT CHANGE UP (ref 3.5–5.3)
POTASSIUM SERPL-SCNC: 3.5 MMOL/L — SIGNIFICANT CHANGE UP (ref 3.5–5.3)
PROT SERPL-MCNC: 7.4 GM/DL — SIGNIFICANT CHANGE UP (ref 6–8.3)
RBC # BLD: 4.82 M/UL — SIGNIFICANT CHANGE UP (ref 4.2–5.8)
RBC # FLD: 14.8 % — HIGH (ref 10.3–14.5)
SODIUM SERPL-SCNC: 142 MMOL/L — SIGNIFICANT CHANGE UP (ref 135–145)
WBC # BLD: 6.55 K/UL — SIGNIFICANT CHANGE UP (ref 3.8–10.5)
WBC # FLD AUTO: 6.55 K/UL — SIGNIFICANT CHANGE UP (ref 3.8–10.5)

## 2023-04-28 PROCEDURE — 99285 EMERGENCY DEPT VISIT HI MDM: CPT | Mod: FS

## 2023-04-28 PROCEDURE — 73030 X-RAY EXAM OF SHOULDER: CPT | Mod: 26,RT

## 2023-04-28 PROCEDURE — 73200 CT UPPER EXTREMITY W/O DYE: CPT | Mod: 26,RT,MA

## 2023-04-28 PROCEDURE — 72170 X-RAY EXAM OF PELVIS: CPT | Mod: 26

## 2023-04-28 PROCEDURE — 70450 CT HEAD/BRAIN W/O DYE: CPT | Mod: 26,MA

## 2023-04-28 PROCEDURE — 93010 ELECTROCARDIOGRAM REPORT: CPT

## 2023-04-28 PROCEDURE — 73060 X-RAY EXAM OF HUMERUS: CPT | Mod: 26,RT

## 2023-04-28 PROCEDURE — 73130 X-RAY EXAM OF HAND: CPT | Mod: 26,RT

## 2023-04-28 PROCEDURE — 73000 X-RAY EXAM OF COLLAR BONE: CPT | Mod: 26,RT

## 2023-04-28 RX ORDER — IBUPROFEN 200 MG
1 TABLET ORAL
Qty: 15 | Refills: 0
Start: 2023-04-28 | End: 2023-05-02

## 2023-04-28 RX ORDER — TRAMADOL HYDROCHLORIDE 50 MG/1
1 TABLET ORAL
Qty: 15 | Refills: 0
Start: 2023-04-28 | End: 2023-05-02

## 2023-04-28 RX ORDER — OXYCODONE AND ACETAMINOPHEN 5; 325 MG/1; MG/1
1 TABLET ORAL ONCE
Refills: 0 | Status: DISCONTINUED | OUTPATIENT
Start: 2023-04-28 | End: 2023-04-28

## 2023-04-28 RX ORDER — HYDRALAZINE HCL 50 MG
50 TABLET ORAL ONCE
Refills: 0 | Status: COMPLETED | OUTPATIENT
Start: 2023-04-28 | End: 2023-04-28

## 2023-04-28 RX ORDER — TETANUS TOXOID, REDUCED DIPHTHERIA TOXOID AND ACELLULAR PERTUSSIS VACCINE, ADSORBED 5; 2.5; 8; 8; 2.5 [IU]/.5ML; [IU]/.5ML; UG/.5ML; UG/.5ML; UG/.5ML
0.5 SUSPENSION INTRAMUSCULAR ONCE
Refills: 0 | Status: COMPLETED | OUTPATIENT
Start: 2023-04-28 | End: 2023-04-28

## 2023-04-28 RX ORDER — KETOROLAC TROMETHAMINE 30 MG/ML
15 SYRINGE (ML) INJECTION ONCE
Refills: 0 | Status: DISCONTINUED | OUTPATIENT
Start: 2023-04-28 | End: 2023-04-28

## 2023-04-28 RX ORDER — DIAZEPAM 5 MG
5 TABLET ORAL ONCE
Refills: 0 | Status: DISCONTINUED | OUTPATIENT
Start: 2023-04-28 | End: 2023-04-28

## 2023-04-28 RX ORDER — LIDOCAINE 4 G/100G
1 CREAM TOPICAL ONCE
Refills: 0 | Status: COMPLETED | OUTPATIENT
Start: 2023-04-28 | End: 2023-04-28

## 2023-04-28 RX ORDER — MORPHINE SULFATE 50 MG/1
4 CAPSULE, EXTENDED RELEASE ORAL ONCE
Refills: 0 | Status: DISCONTINUED | OUTPATIENT
Start: 2023-04-28 | End: 2023-04-28

## 2023-04-28 RX ORDER — METHOCARBAMOL 500 MG/1
2 TABLET, FILM COATED ORAL
Qty: 18 | Refills: 0
Start: 2023-04-28 | End: 2023-04-30

## 2023-04-28 RX ADMIN — OXYCODONE AND ACETAMINOPHEN 1 TABLET(S): 5; 325 TABLET ORAL at 19:38

## 2023-04-28 RX ADMIN — MORPHINE SULFATE 4 MILLIGRAM(S): 50 CAPSULE, EXTENDED RELEASE ORAL at 16:36

## 2023-04-28 RX ADMIN — OXYCODONE AND ACETAMINOPHEN 1 TABLET(S): 5; 325 TABLET ORAL at 21:04

## 2023-04-28 RX ADMIN — Medication 0.2 MILLIGRAM(S): at 19:38

## 2023-04-28 RX ADMIN — Medication 50 MILLIGRAM(S): at 19:38

## 2023-04-28 RX ADMIN — Medication 15 MILLIGRAM(S): at 21:04

## 2023-04-28 RX ADMIN — LIDOCAINE 1 PATCH: 4 CREAM TOPICAL at 18:37

## 2023-04-28 RX ADMIN — TETANUS TOXOID, REDUCED DIPHTHERIA TOXOID AND ACELLULAR PERTUSSIS VACCINE, ADSORBED 0.5 MILLILITER(S): 5; 2.5; 8; 8; 2.5 SUSPENSION INTRAMUSCULAR at 17:50

## 2023-04-28 RX ADMIN — Medication 5 MILLIGRAM(S): at 18:37

## 2023-04-28 RX ADMIN — MORPHINE SULFATE 4 MILLIGRAM(S): 50 CAPSULE, EXTENDED RELEASE ORAL at 17:52

## 2023-04-28 RX ADMIN — Medication 15 MILLIGRAM(S): at 18:37

## 2023-04-28 NOTE — ED PROVIDER NOTE - CARE PROVIDER_API CALL
Meño Gore (DO)  Orthopaedic Surgery  [address] [place] [address]  Phone: [phone]  Fax: [phone]  Follow Up Time:

## 2023-04-28 NOTE — ED PROVIDER NOTE - OBJECTIVE STATEMENT
72y Male with PMHx of HTN, HLD, CVA on aspirin and plavix presents to the ER for fall. Patient reports slip and fall outside, landing on the right side on concrete. Reports 11/10 R shoulder pain, cannot move it. Reports abrasions to hand, states he landed on glass, EMS pulled out pieces. Last tetanus unknown. Unsure if he hit his head or not. Reports numbness and tingling to R hand. Reports taking BP medications daily as directed. Denies headache, acute changes in vision, chest pain, SOB, palpitations.

## 2023-04-28 NOTE — ED PROVIDER NOTE - PATIENT PORTAL LINK FT
You can access the FollowMyHealth Patient Portal offered by [facility] by registering at the following website: [email or web address]. By joining [employer]’s FollowMyHealth portal, you will also be able to view your health information using other applications (apps) compatible with our system. You can access the FollowMyHealth Patient Portal offered by [facility] by registering at the following website: [email or web address] By joining [employer]’s FollowMyHealth portal, you will also be able to view your health information using other applications (apps) compatible with our system.

## 2023-04-28 NOTE — ED PROVIDER NOTE - PENDING LAB RAD OPT OUT
Stable
Exclude Pending Lab, Cardiology, and Radiology orders from printing on the Patient's Discharge Instructions, due to Privacy Concerns.

## 2023-04-28 NOTE — ED ADULT NURSE NOTE - OBJECTIVE STATEMENT
Patient BIB EMS after slip and fall, c/o R shoulder pain with limited ROM.. BP elevated in triage, EKG done. Patient denies CP or SOB. Patient BIB EMS after slip and fall, c/o R shoulder pain with limited ROM.. BP elevated in triage, EKG done. Patient denies CP or SOB.[ Noted cuts to the right hand]

## 2023-04-28 NOTE — ED PROVIDER NOTE - PROGRESS NOTE DETAILS
YAHAIRA Escobar : all results reviewed with patient, no evidence of fracture, dislocation or FB. Will give meds for muscle spasm/inflammation at NH.

## 2023-04-28 NOTE — ED PROVIDER NOTE - WR ORDER STATUS 4
Performed Sotyktu Pregnancy And Lactation Text: There is insufficient data to evaluate whether or not Sotyktu is safe to use during pregnancy.? ?It is not known if Sotyktu passes into breast milk and whether or not it is safe to use when breastfeeding.??

## 2023-04-28 NOTE — ED PROVIDER NOTE - NSFOLLOWUPCLINICS_GEN_ALL_ED_FT
[facility] Orthopedic Surgery  Orthopedic Surgery  [address], 3rd & 4th [place] [address]  Phone: [phone]  Fax:     [facility]  Orthopedic Surgery  [address] [address]  Phone: [phone]  Fax:

## 2023-04-28 NOTE — ED PROVIDER NOTE - NSFOLLOWUPINSTRUCTIONS_ED_ALL_ED_FT
Today you were seen in the ER for shoulder pain, hip pain and hand pain after fall.     Please see below for a copy of your results.     A lidocaine patch was placed and can stay on for 12 hours.     Take Motrin 600 mg every 8 hours as needed for moderate pain-- take with food..    Take Tylenol 650mg (Two 325 mg pills) every 4-6 hours as needed for pain.    A prescription for robaxin was sent to the pharmacy. Read all instructions and take as directed.     Rest, Ice, Elevate injured area    Follow-up with Orthopedics if symptoms persist, See referred doctor. Call today / next business day for close, prompt follow-up.    Return to Emergency room for any worsening or persistent pain, weakness, numbness, fever, color change to extremity, or any other concerning symptoms.    Advance activity as tolerated.     Continue all previously prescribed medications as directed unless otherwise instructed.     Follow up with your primary care physician in 48-72 hours- bring copies of your results.

## 2023-04-28 NOTE — ED ADULT TRIAGE NOTE - CHIEF COMPLAINT QUOTE
STEVE slipped and fell and hit concrete. Questionable head trauma but denies LOC. Complains of right shoulder pain. Arrived with right arm sling from EMS> PMH HTN, TIA on 2021. Took BP meds this morning.

## 2023-04-28 NOTE — ED PROVIDER NOTE - CLINICAL SUMMARY MEDICAL DECISION MAKING FREE TEXT BOX
72y Male with PMHx of HTN, HLD, CVA on aspirin and plavix presents to the ER for fall. Reports slip and fall outside, landing on the right side on concrete, R shoulder pain, cannot move it. Abrasions to R hand, no active bleeding, no laceration. Last tetanus unknown. Reports numbness and tingling to R hand. Denies headache, acute changes in vision, chest pain, SOB, palpitations. Hypertensive, appears uncomfortable, EKG NSR@71bpm, tenderness to R shoulder, dec ROM, neurovascularly intact. Likely humerus fracture vs shoulder dislocation, low suspicion for hip fracture vs dislocation or intracranial pathology. Will get labs, CT, meds, reassesss.

## 2023-08-07 ENCOUNTER — EMERGENCY (EMERGENCY)
Facility: HOSPITAL | Age: 73
LOS: 0 days | Discharge: HOME | End: 2023-08-07
Payer: MEDICARE

## 2023-08-07 VITALS
HEIGHT: 66 IN | DIASTOLIC BLOOD PRESSURE: 67 MMHG | OXYGEN SATURATION: 98 % | WEIGHT: 177.91 LBS | HEART RATE: 66 BPM | RESPIRATION RATE: 18 BRPM | SYSTOLIC BLOOD PRESSURE: 112 MMHG | TEMPERATURE: 98 F

## 2023-08-07 VITALS
DIASTOLIC BLOOD PRESSURE: 66 MMHG | HEART RATE: 55 BPM | RESPIRATION RATE: 18 BRPM | TEMPERATURE: 98 F | OXYGEN SATURATION: 99 % | SYSTOLIC BLOOD PRESSURE: 119 MMHG

## 2023-08-07 DIAGNOSIS — I25.10 ATHEROSCLEROTIC HEART DISEASE OF NATIVE CORONARY ARTERY WITHOUT ANGINA PECTORIS: ICD-10-CM

## 2023-08-07 DIAGNOSIS — M25.511 PAIN IN RIGHT SHOULDER: ICD-10-CM

## 2023-08-07 DIAGNOSIS — Z91.138 PATIENT'S UNINTENTIONAL UNDERDOSING OF MEDICATION REGIMEN FOR OTHER REASON: ICD-10-CM

## 2023-08-07 DIAGNOSIS — Z98.890 OTHER SPECIFIED POSTPROCEDURAL STATES: Chronic | ICD-10-CM

## 2023-08-07 DIAGNOSIS — Z95.5 PRESENCE OF CORONARY ANGIOPLASTY IMPLANT AND GRAFT: ICD-10-CM

## 2023-08-07 DIAGNOSIS — E78.00 PURE HYPERCHOLESTEROLEMIA, UNSPECIFIED: ICD-10-CM

## 2023-08-07 DIAGNOSIS — Z86.73 PERSONAL HISTORY OF TRANSIENT ISCHEMIC ATTACK (TIA), AND CEREBRAL INFARCTION WITHOUT RESIDUAL DEFICITS: ICD-10-CM

## 2023-08-07 DIAGNOSIS — Z79.82 LONG TERM (CURRENT) USE OF ASPIRIN: ICD-10-CM

## 2023-08-07 DIAGNOSIS — Z79.02 LONG TERM (CURRENT) USE OF ANTITHROMBOTICS/ANTIPLATELETS: ICD-10-CM

## 2023-08-07 DIAGNOSIS — T48.1X6A: ICD-10-CM

## 2023-08-07 DIAGNOSIS — I10 ESSENTIAL (PRIMARY) HYPERTENSION: ICD-10-CM

## 2023-08-07 PROCEDURE — 99283 EMERGENCY DEPT VISIT LOW MDM: CPT

## 2023-08-07 RX ORDER — LIDOCAINE 4 G/100G
1 CREAM TOPICAL
Qty: 1 | Refills: 0
Start: 2023-08-07 | End: 2023-08-11

## 2023-08-07 RX ORDER — LIDOCAINE 4 G/100G
1 CREAM TOPICAL ONCE
Refills: 0 | Status: COMPLETED | OUTPATIENT
Start: 2023-08-07 | End: 2023-08-07

## 2023-08-07 RX ORDER — ACETAMINOPHEN 500 MG
650 TABLET ORAL ONCE
Refills: 0 | Status: COMPLETED | OUTPATIENT
Start: 2023-08-07 | End: 2023-08-07

## 2023-08-07 RX ORDER — METHOCARBAMOL 500 MG/1
1 TABLET, FILM COATED ORAL
Qty: 9 | Refills: 0
Start: 2023-08-07 | End: 2023-08-09

## 2023-08-07 RX ADMIN — LIDOCAINE 1 PATCH: 4 CREAM TOPICAL at 14:22

## 2023-08-07 RX ADMIN — Medication 650 MILLIGRAM(S): at 14:23

## 2023-08-07 RX ADMIN — Medication 650 MILLIGRAM(S): at 15:05

## 2023-08-07 NOTE — ED PROVIDER NOTE - PATIENT PORTAL LINK FT
You can access the FollowMyHealth Patient Portal offered by [facility] by registering at the following website: [email or web address]. By joining [employer]’s FollowMyHealth portal, you will also be able to view your health information using other applications (apps) compatible with our system.

## 2023-08-07 NOTE — ED ADULT TRIAGE NOTE - CHIEF COMPLAINT QUOTE
Patient walked in with complaints of pain to R shoulder, pt states he was diagnosed few months ago with rotator cuff to site but pain has not subsided. denies any recent injury to site. PMx: HTN, Hyperlipemia, cardiac Stents x2 2022.

## 2023-08-07 NOTE — ED PROVIDER NOTE - PHYSICAL EXAMINATION
GEN: Awake, alert, interactive, NAD.  HEAD AND NECK: NC/AT. Airway patent. Neck supple.   EYES:  Clear b/l.   ENT: Moist mucus membranes.   CARDIAC: Regular rate, regular rhythm. No evident pedal edema.    RESP/CHEST: Normal respiratory effort with no use of accessory muscles or retractions. Clear throughout on auscultation.  ABD: soft, non-distended, non-tender. No rebound, no guarding.   BACK: No midline spinal TTP. No CVAT.   EXTREMITIES: RUE: No deformity, (+) able to raise shoulder 90 degrees, (+) FROm of the elbow, wrist, and fingers, (+) pulses intact. no edema, no erythema. Moving all other extremities with no apparent deformities.   SKIN: Warm, dry, intact normal color. No rash.   NEURO: AOx3, no focal deficits.   PSYCH: Appropriate mood and affect.

## 2023-08-07 NOTE — ED PROVIDER NOTE - OBJECTIVE STATEMENT
spherecylinderaxisaddprismvertexVAInt VANVExaminer 71 y/o male with cad with 2 stents, htn presents with right shoulder pain since April s/p injury. Pt was seen here in April and had ct scan of the ri 71 y/o male with cad with 2 stents, htn presents with right shoulder pain since April s/p injury. Pt was seen here in April and had ct scan of the right shoulder showing rotator cuff injury. Pt states he has been taking muscle relaxants for the pain but ran out of it. Pt took tylenol at home with no relief. Denies chest pain, dyspnea, numbness, tingling, fever, chills, recent injuries. Pt did see ortho few months ago at [facility] 73 y/o male with cad with 2 stents, htn presents with right shoulder pain since April s/p injury. Pt was seen here in April and had ct scan of the right shoulder showing rotator cuff injury. Pt states he has been taking muscle relaxants for the pain but ran out of it. Pt took tylenol at home with no relief. Denies chest pain, dyspnea, numbness, tingling, fever, chills, recent injuries. Pt did see ortho few months ago at [facility] and was given cortisone shots with mild relief.  Pt denies recent travel.

## 2023-08-07 NOTE — ED ADULT NURSE NOTE - OBJECTIVE STATEMENT
patient is A&Ox4. Breathing unlabored on RA. PMH HTN, HLD, cardiac stent x2, CAD, CVA. patient on Plavix. patient complaining of right shoulder pain x a couple of months. patient reports being diagnosed with rotator cuff in April but still having intermittent episodes throbbing pain in right shoulder. patient reports taking using hot and cold pack, muscle relaxant and Tylenol at home. reports relief with meds. reports taking Tylenol this morning around 6 am. patient c/o of not being able to lift the right arm all the way up. patient wants to be evaluated. denies any cp, sob, difficulty breathing, n/v/d, fever, chills, numbness, tingling, loss of sensation.

## 2023-08-07 NOTE — ED PROVIDER NOTE - NSFOLLOWUPINSTRUCTIONS_ED_ALL_ED_FT
Rest, drink plenty of fluids.  Advance activity as tolerated.  Continue all previously prescribed medications as directed.  Follow up with your ortho pedics this week. - bring copies of your results.  Return to the ER for worsening or persistent symptoms, and/or ANY NEW OR CONCERNING SYMPTOMS. If you have issues obtaining follow up, please call: [phone] to obtain a doctor or specialist who takes your insurance in your area.  You may call [phone] to make an appointment with the internal medicine clinic.

## 2023-08-07 NOTE — ED PROVIDER NOTE - CLINICAL SUMMARY MEDICAL DECISION MAKING FREE TEXT BOX
71 y/o male with htn, cad with stents here with right shoulder pain since April. No recent trauma. Vs stable.   No imaging needed at this time.   Pt treated with tylenol and lidocaine patch.   Outpatient ortho follow up referral given.   Rx robaxin and lidocaine patch sent to pharmacy.

## 2023-08-08 ENCOUNTER — NON-APPOINTMENT (OUTPATIENT)
Age: 73
End: 2023-08-08

## 2023-08-08 ENCOUNTER — APPOINTMENT (OUTPATIENT)
Dept: ORTHOPEDIC SURGERY | Facility: CLINIC | Age: 73
End: 2023-08-08
Payer: MEDICARE

## 2023-08-08 VITALS — HEIGHT: 60 IN | WEIGHT: 180 LBS | BODY MASS INDEX: 35.34 KG/M2

## 2023-08-08 PROCEDURE — 99213 OFFICE O/P EST LOW 20 MIN: CPT

## 2023-08-08 NOTE — PHYSICAL EXAM
[de-identified] : R shoulder: +swelling Decreased ROM in Add, abd, IR/ER, FF +impingement +drop arm

## 2023-08-08 NOTE — HISTORY OF PRESENT ILLNESS
[Sudden] : sudden [7] : 7 [3] : 3 [Dull/Aching] : dull/aching [Throbbing] : throbbing [Meds] : meds [Ice] : ice [de-identified] : R shoulder RCT for about 3 months  He slipped on debris from broken furniture and fell on R arm at that time Diagnosed on CT scan   [] : no [FreeTextEntry1] : R shoulder [FreeTextEntry5] : he slipped and fell few months back. went to [facility] er-xr,CT [de-identified] : activity [de-identified] : xr,ct

## 2023-08-24 ENCOUNTER — APPOINTMENT (OUTPATIENT)
Dept: ORTHOPEDIC SURGERY | Facility: CLINIC | Age: 73
End: 2023-08-24
Payer: MEDICARE

## 2023-08-24 VITALS — WEIGHT: 180 LBS | HEIGHT: 60 IN | BODY MASS INDEX: 35.34 KG/M2

## 2023-08-24 PROCEDURE — 99213 OFFICE O/P EST LOW 20 MIN: CPT

## 2023-08-24 NOTE — HISTORY OF PRESENT ILLNESS
[7] : 7 [5] : 5 [Dull/Aching] : dull/aching [Throbbing] : throbbing [de-identified] : He could not get MRI due to cardio clearance  [FreeTextEntry1] : R shoulder  [FreeTextEntry5] : he needs clearance from cardiologist for MRI.  [de-identified] : Tylenol

## 2023-08-24 NOTE — PHYSICAL EXAM
[de-identified] : R shoulder: +swelling Decreased ROM in Add, abd, IR/ER, FF +impingement +drop arm

## 2023-09-20 ENCOUNTER — RX RENEWAL (OUTPATIENT)
Age: 73
End: 2023-09-20

## 2023-09-20 RX ORDER — MELOXICAM 15 MG/1
15 TABLET ORAL
Qty: 30 | Refills: 0 | Status: ACTIVE | COMMUNITY
Start: 2023-08-24 | End: 1900-01-01

## 2023-10-03 ENCOUNTER — APPOINTMENT (OUTPATIENT)
Dept: MRI IMAGING | Facility: CLINIC | Age: 73
End: 2023-10-03
Payer: MEDICARE

## 2023-10-03 PROCEDURE — 73221 MRI JOINT UPR EXTREM W/O DYE: CPT | Mod: RT

## 2023-10-10 ENCOUNTER — APPOINTMENT (OUTPATIENT)
Dept: ORTHOPEDIC SURGERY | Facility: CLINIC | Age: 73
End: 2023-10-10

## 2023-10-30 ENCOUNTER — APPOINTMENT (OUTPATIENT)
Dept: ORTHOPEDIC SURGERY | Facility: CLINIC | Age: 73
End: 2023-10-30

## 2023-11-01 ENCOUNTER — APPOINTMENT (OUTPATIENT)
Dept: ORTHOPEDIC SURGERY | Facility: CLINIC | Age: 73
End: 2023-11-01
Payer: MEDICARE

## 2023-11-01 VITALS — BODY MASS INDEX: 35.34 KG/M2 | WEIGHT: 180 LBS | HEIGHT: 60 IN

## 2023-11-01 DIAGNOSIS — M19.012 PRIMARY OSTEOARTHRITIS, LEFT SHOULDER: ICD-10-CM

## 2023-11-01 PROCEDURE — 99214 OFFICE O/P EST MOD 30 MIN: CPT

## 2023-11-01 PROCEDURE — 73030 X-RAY EXAM OF SHOULDER: CPT | Mod: RT

## 2023-11-10 ENCOUNTER — APPOINTMENT (OUTPATIENT)
Dept: ORTHOPEDIC SURGERY | Facility: CLINIC | Age: 73
End: 2023-11-10
Payer: MEDICARE

## 2023-11-10 VITALS — BODY MASS INDEX: 35.34 KG/M2 | WEIGHT: 180 LBS | HEIGHT: 60 IN

## 2023-11-10 DIAGNOSIS — M19.011 PRIMARY OSTEOARTHRITIS, RIGHT SHOULDER: ICD-10-CM

## 2023-11-10 DIAGNOSIS — S46.011A STRAIN OF MUSCLE(S) AND TENDON(S) OF THE ROTATOR CUFF OF RIGHT SHOULDER, INITIAL ENCOUNTER: ICD-10-CM

## 2023-11-10 PROCEDURE — 99215 OFFICE O/P EST HI 40 MIN: CPT

## 2023-12-22 VITALS — BODY MASS INDEX: 25.77 KG/M2 | HEIGHT: 70 IN | WEIGHT: 180 LBS

## 2024-01-17 ENCOUNTER — APPOINTMENT (OUTPATIENT)
Dept: ORTHOPEDIC SURGERY | Facility: CLINIC | Age: 74
End: 2024-01-17

## 2024-05-09 NOTE — ED PROVIDER NOTE - OBJECTIVE STATEMENT
MEDICATIONS  (PRN):  hydrOXYzine hydrochloride 25 milliGRAM(s) Oral every 6 hours PRN Anxiety/ sleep disturbances  LORazepam     Tablet 1 milliGRAM(s) Oral every 6 hours PRN agitation/ severe anxiety  LORazepam   Injectable 2 milliGRAM(s) IntraMuscular Once PRN severe agitation  melatonin. 3 milliGRAM(s) Oral at bedtime PRN Insomnia   71 y/o M presents to ED requesting covid 19 testing no associated symptoms denies fever, headache, cough, N/V and other concerns. MEDICATIONS  (PRN):  hydrOXYzine hydrochloride 25 milliGRAM(s) Oral every 6 hours PRN Anxiety/ sleep disturbances  melatonin. 3 milliGRAM(s) Oral at bedtime PRN Insomnia

## 2024-10-31 NOTE — ED ADULT TRIAGE NOTE - WEIGHT IN KG
Vannessa Banks was evaluated at [facility] Amando Taveras RPH    81.6

## 2024-12-22 NOTE — ED ADULT NURSE NOTE - CAS EDN DISCHARGE INTERVENTIONS
Continued Stay SW/CM Assessment/Plan of Care Note       Active Substitute Decision Maker (SDM)       Babatunde Bunn Health Care Agent 1 - Son   Primary Phone: [phone] (Mobile)                     Progress note:  [facility] meting with pt's SDM, son and family today 6:00 pm.     See SW/CM flowsheets for other objective data.    Disposition Recommendations:  Preliminary discharge destination:    SW/CM recommendation for discharge: Hospice    Destination Pharmacy: [facility] [address] [facility] confirmed with facility    Discharge Plan/Needs:     Continued Care and Services - Admitted Since 11/25/2024       Destination  Coordination complete.      Service Provider Request Status Selected Services Address Phone Fax    [facility] [address] [place] [address] -- [phone]                      Devices/ Equipment that need to be arranged for discharge:     Accepted   Pending insurance authorization   Others:    Anticipated date of DME availability:     Prior To Hospitalization:    Living Situation: Adult children and residing at House    .  Support Systems: Family members, Children   Home Devices/Equipment: None            Mobility Assist Devices: None   Type of Service Prior to Hospitalization: Other (comment)               Patient/Family discharge goal (s):  Sub-acute nursing home     Resources provided:           Prior Function                Current Function  Last Filed Values         Value Time User    Current Function  significantly below baseline level of function 12/3/2024  3:34 PM Daria Sheth PTA    Therapy Impairments  activity tolerance; balance; strength 12/3/2024  3:34 PM Daria Sheth PTA    ADLs Requiring Support  bed mobility; transfers; ambulation; stairs 12/3/2024  3:34 PM Daria Sheth PTA            Therapy Recommendations for Discharge:   PT:      Last Filed Values         Value Time User    PT  Discharge Needs  therapy 5 or more times per week 12/3/2024  3:34 PM Daria Sheth PTA          OT:       Last Filed Values         Value Time User    OT Discharge Needs  therapy 5 or more times per week 12/5/2024  1:34 PM Socrates De Jesus OTA          SLP:    Last Filed Values       None            Mobility Equipment Recommended for Discharge: if home: hospital bed, richelle, w/c with removable and elevating leg rests      Barriers to Discharge  Identified Barriers to Discharge/Transition Planning:                     NA

## 2025-03-12 NOTE — ED ADULT TRIAGE NOTE - HEART RATE (BEATS/MIN)
03/12/25 1100   Daily Treatment   Symptoms Review Activity Group Individual   Affect Anxious   Mood Anxious   Thought Process Appropriate   Psychosis None   Sleep Report Good   Patient Response Appropriate feedback   RN Monitoring of Pain, Safety, and Relapse   Safety Concerns None   Types of Safety Concerns none   Physical Concerns none   Pain Concerns none   Use of Street Drugs or Alcohol No   Taking Medications as Prescribed Yes   Patient Response Appropriate feedback   Nursing Comments Writer met with fouzia today to review AVS, safety plan, and school excuse.  Discharge paperwork will be sent home with patient today.  He verbalized understanding and denied safety concerns.        66

## 2025-03-22 NOTE — ED PROVIDER NOTE - CHIEF COMPLAINT
Pt to CT via cart with CT tech. She is on telemetry and 6L NC for transport.    The patient is a 71y Male complaining of urinary retention
